# Patient Record
Sex: FEMALE | Race: BLACK OR AFRICAN AMERICAN | NOT HISPANIC OR LATINO | Employment: UNEMPLOYED | URBAN - METROPOLITAN AREA
[De-identification: names, ages, dates, MRNs, and addresses within clinical notes are randomized per-mention and may not be internally consistent; named-entity substitution may affect disease eponyms.]

---

## 2018-05-10 ENCOUNTER — TRANSFERRED RECORDS (OUTPATIENT)
Dept: HEALTH INFORMATION MANAGEMENT | Facility: CLINIC | Age: 33
End: 2018-05-10

## 2019-03-14 ENCOUNTER — OFFICE VISIT (OUTPATIENT)
Dept: FAMILY MEDICINE | Facility: CLINIC | Age: 34
End: 2019-03-14
Payer: COMMERCIAL

## 2019-03-14 VITALS
DIASTOLIC BLOOD PRESSURE: 72 MMHG | SYSTOLIC BLOOD PRESSURE: 110 MMHG | WEIGHT: 150.6 LBS | BODY MASS INDEX: 26.68 KG/M2 | HEART RATE: 100 BPM | RESPIRATION RATE: 25 BRPM | HEIGHT: 63 IN | OXYGEN SATURATION: 99 %

## 2019-03-14 DIAGNOSIS — Z83.3 FAMILY HISTORY OF DIABETES MELLITUS IN MOTHER: ICD-10-CM

## 2019-03-14 DIAGNOSIS — Z13.220 SCREENING FOR HYPERLIPIDEMIA: ICD-10-CM

## 2019-03-14 DIAGNOSIS — Z00.00 ROUTINE HISTORY AND PHYSICAL EXAMINATION OF ADULT: Primary | ICD-10-CM

## 2019-03-14 DIAGNOSIS — Z13.1 SCREENING FOR DIABETES MELLITUS: ICD-10-CM

## 2019-03-14 DIAGNOSIS — L81.1 MELASMA: ICD-10-CM

## 2019-03-14 PROCEDURE — 36415 COLL VENOUS BLD VENIPUNCTURE: CPT | Performed by: NURSE PRACTITIONER

## 2019-03-14 PROCEDURE — 82947 ASSAY GLUCOSE BLOOD QUANT: CPT | Performed by: NURSE PRACTITIONER

## 2019-03-14 PROCEDURE — 99385 PREV VISIT NEW AGE 18-39: CPT | Performed by: NURSE PRACTITIONER

## 2019-03-14 PROCEDURE — 80061 LIPID PANEL: CPT | Performed by: NURSE PRACTITIONER

## 2019-03-14 SDOH — HEALTH STABILITY: MENTAL HEALTH: HOW OFTEN DO YOU HAVE A DRINK CONTAINING ALCOHOL?: NEVER

## 2019-03-14 ASSESSMENT — PAIN SCALES - GENERAL: PAINLEVEL: NO PAIN (0)

## 2019-03-14 ASSESSMENT — MIFFLIN-ST. JEOR: SCORE: 1347.25

## 2019-03-14 NOTE — Clinical Note
Please abstract the following data from this visit with this patient into the appropriate field in Epic:Pap smear done on this date: 11/14/16 (approximately), by this group: [facility], results were normal with negative HPV (record in CareEverywhere).

## 2019-03-14 NOTE — LETTER
"March 15, 2019      Thea Rodriguez  [address] [place] [id number]        Dear Thea,     Your cholesterol levels are high.   Your \"good\" HDL cholesterol is low.  Increasing exercise can improve the \"good\" HDL cholesterol.   Your fasting blood sugar is 105, which is in the Prediabetes range of 100-125.   I recommend that you avoid added sugar, sugary beverages, sodas, juice, junk foods, and sweets.  Enclosed are the results.    Results for orders placed or performed in visit on 03/14/19   Lipid Profile (Chol, Trig, HDL, LDL calc)   Result Value Ref Range    Cholesterol 215 (H) <200 mg/dL    Triglycerides 102 <150 mg/dL    HDL Cholesterol 33 (L) >49 mg/dL    LDL Cholesterol Calculated 162 (H) <100 mg/dL    Non HDL Cholesterol 182 (H) <130 mg/dL   Glucose   Result Value Ref Range    Glucose 105 (H) 70 - 99 mg/dL     I recommend we recheck your fasting labs in 6-12 months.   Please consider the below nutrition recommendations as well.       Ways to improve your cholesterol       1- Eats less saturated fats (including avoiding \"trans\" fats).       2 - Eat more unsaturated fats  - found in vegetables, grains, and tree nuts.                        Also by replacing butter with canola oil or olive oil.       3 - Eat more nuts.                        1-2 ounces (a small handful) of almonds, walnuts, hazelnuts or pecans once a day in place of other less healthy snacks.       4 - Eat more high fiber foods - vegetables and whole grains including oat bran, oats, beans, peas, and flax seed.       5 - Eat more fish - such as salmon, tuna, mackerel, and sardines.  1 or 2 six ounce servings per week is a healthy replacement for other proteins.       6 - Exercise for at least 150 minutes per week - which is equal to 30 minutes 5 days per week.     Sincerely,      Vi Castaneda, DNP, APRN, CNP/kb                      "

## 2019-03-14 NOTE — PROGRESS NOTES
SUBJECTIVE:   CC: Thea Rodriguez is an 33 year old woman who presents for preventive health visit.     Healthy Habits:    Do you get at least three servings of calcium containing foods daily (dairy, green leafy vegetables, etc.)? yes    Amount of exercise or daily activities, outside of work: no    Problems taking medications regularly No    Medication side effects: No    Have you had an eye exam in the past two years? no    Do you see a dentist twice per year? no    Do you have sleep apnea, excessive snoring or daytime drowsiness?no    Pigmentation changes on face- nose, cheeks, forehead.  Started 2 years ago and worsened with recent pregnancy.  She has a 5 month old child.  Is not breastfeeding anymore.  Cleans her face with a yogurt.    Headaches that come and go.    Today's PHQ-2 Score:   PHQ-2 ( 1999 Pf[employer]) 3/14/2019   Q1: Little interest or pleasure in doing things 0   Q2: Feeling down, depressed or hopeless 0   PHQ-2 Score 0       Abuse: Current or Past(Physical, Sexual or Emotional)- NO  Do you feel safe in your environment? YES    Social History     Tobacco Use     Smoking status: Not on file     Smokeless tobacco: Never Used   Substance Use Topics     Alcohol use: No     Frequency: Never     If you drink alcohol do you typically have >3 drinks per day or >7 drinks per week? Yes - AUDIT SCORE:     No flowsheet data found.                     Reviewed orders with patient.  Reviewed health maintenance and updated orders accordingly - Yes  BP Readings from Last 3 Encounters:   03/14/19 110/72    Wt Readings from Last 3 Encounters:   03/14/19 68.3 kg (150 lb 9.6 oz)                  Patient Active Problem List   Diagnosis     Melasma     History reviewed. No pertinent surgical history.    Social History     Tobacco Use     Smoking status: Not on file     Smokeless tobacco: Never Used   Substance Use Topics     Alcohol use: No     Frequency: Never     Family History   Problem Relation Age of Onset      "Diabetes Mother 50     No Known Problems Father      No Known Problems Brother      No Known Problems Sister      No Known Problems Brother      No Known Problems Brother      No Known Problems Brother      No Known Problems Sister      Breast Cancer No family hx of      Heart Disease No family hx of          No current outpatient medications on file.     Not on File    Mammogram not appropriate for this patient based on age.    Pertinent mammograms are reviewed under the imaging tab.  History of abnormal Pap smear: unknown.  Last Pap within normal limits     Reviewed and updated as needed this visit by clinical staff  Allergies  Meds  Problems  Med Hx  Surg Hx  Fam Hx         Reviewed and updated as needed this visit by Provider  Allergies  Meds  Problems  Med Hx  Surg Hx  Fam Hx            ROS:  CONSTITUTIONAL: NEGATIVE for fever, chills, change in weight  INTEGUMENTARY/SKIN: POSITIVE for pigmentation changes  EYES: NEGATIVE for vision changes or irritation  ENT: NEGATIVE for ear, mouth and throat problems  RESP: NEGATIVE for significant cough or SOB  BREAST: NEGATIVE for masses, tenderness or discharge  CV: NEGATIVE for chest pain, palpitations or peripheral edema  GI: NEGATIVE for nausea, abdominal pain, heartburn, or change in bowel habits  : NEGATIVE for unusual urinary or vaginal symptoms. Periods are regular.  MUSCULOSKELETAL: NEGATIVE for significant arthralgias or myalgia  NEURO: NEGATIVE for weakness, dizziness or paresthesias  PSYCHIATRIC: NEGATIVE for changes in mood or affect    OBJECTIVE:   /72 (BP Location: Right arm, Patient Position: Chair, Cuff Size: Adult Regular)   Pulse 100   Resp 25   Ht 1.6 m (5' 3\")   Wt 68.3 kg (150 lb 9.6 oz)   SpO2 99%   BMI 26.68 kg/m    EXAM:  GENERAL: healthy, alert and no distress  EYES: Eyes grossly normal to inspection, PERRL and conjunctivae and sclerae normal  HENT: ear canals and TM's normal, nose and mouth without ulcers or " "lesions  NECK: no adenopathy, no asymmetry, masses, or scars and thyroid normal to palpation  RESP: lungs clear to auscultation - no rales, rhonchi or wheezes  BREAST: normal without masses, tenderness or nipple discharge and no palpable axillary masses or adenopathy  CV: regular rate and rhythm, normal S1 S2, no S3 or S4, no murmur, click or rub, no peripheral edema and peripheral pulses strong  ABDOMEN: soft, nontender, no hepatosplenomegaly, no masses and bowel sounds normal  MS: no gross musculoskeletal defects noted, no edema  SKIN: Confluent hyperpigmented macules on the cheeks, forehead, nose  NEURO: Normal strength and tone, mentation intact and speech normal  PSYCH: mentation appears normal, affect normal/bright      ASSESSMENT/PLAN:   1. Routine history and physical examination of adult  -Pap due 11/2021 as last Pap at [facility] normal negative HPV and will be abstracted.    2. Melasma    - DERMATOLOGY REFERRAL    3. Screening for diabetes mellitus    - Glucose    4. Screening for hyperlipidemia    - Lipid Profile (Chol, Trig, HDL, LDL calc)    COUNSELING:   Reviewed preventive health counseling, as reflected in patient instructions       Regular exercise       Healthy diet/nutrition    BP Readings from Last 1 Encounters:   03/14/19 110/72     Estimated body mass index is 26.68 kg/m  as calculated from the following:    Height as of this encounter: 1.6 m (5' 3\").    Weight as of this encounter: 68.3 kg (150 lb 9.6 oz).      Weight management plan: Discussed healthy diet and exercise guidelines     does not have a smoking history on file. she has never used smokeless tobacco.      Counseling Resources:  ATP IV Guidelines  Pooled Cohorts Equation Calculator  Breast Cancer Risk Calculator  FRAX Risk Assessment  ICSI Preventive Guidelines  Dietary Guidelines for Americans, 2010  USDA's MyPlate  ASA Prophylaxis  Lung CA Screening    Vi Castaneda, NP  [facility]  "

## 2019-03-15 PROBLEM — E78.5 HYPERLIPIDEMIA LDL GOAL <160: Status: ACTIVE | Noted: 2019-03-15

## 2019-03-15 PROBLEM — R73.01 IMPAIRED FASTING GLUCOSE: Status: ACTIVE | Noted: 2019-03-15

## 2019-03-15 LAB
CHOLEST SERPL-MCNC: 215 MG/DL
GLUCOSE SERPL-MCNC: 105 MG/DL (ref 70–99)
HDLC SERPL-MCNC: 33 MG/DL
LDLC SERPL CALC-MCNC: 162 MG/DL
NONHDLC SERPL-MCNC: 182 MG/DL
TRIGL SERPL-MCNC: 102 MG/DL

## 2019-03-25 ENCOUNTER — TELEPHONE (OUTPATIENT)
Dept: FAMILY MEDICINE | Facility: CLINIC | Age: 34
End: 2019-03-25

## 2019-03-25 NOTE — TELEPHONE ENCOUNTER
Called patient and spoke with  who states that after using prescribed cream his wife's face looked like it was burned. Advised that she be seen in clinic by a provider to determine next steps of treatment.  verbalized understanding and agreement of plan.     Opal Soto RN

## 2019-03-25 NOTE — TELEPHONE ENCOUNTER
Reason for Call:  Medication or medication refill:    Do you use a [facility]?  Name of the pharmacy and phone number for the current request:  Did not specify      Name of the medication requested: Lotion    Other request: Patient's  called in saying that when Thea saw Leonel on 3/14 she was prescribed some lotion and is having negative side effects from that lotion. Patient's  would like a call back to discuss other options.    Can we leave a detailed message on this number? YES    Phone number patient can be reached at: Other phone number:  [phone]    Best Time: anytime    Call taken on 3/25/2019 at 1:31 PM by Brandon De La Rosa

## 2019-04-26 ENCOUNTER — OFFICE VISIT (OUTPATIENT)
Dept: FAMILY MEDICINE | Facility: CLINIC | Age: 34
End: 2019-04-26
Payer: COMMERCIAL

## 2019-04-26 VITALS
DIASTOLIC BLOOD PRESSURE: 80 MMHG | OXYGEN SATURATION: 100 % | HEART RATE: 80 BPM | RESPIRATION RATE: 20 BRPM | WEIGHT: 145.6 LBS | BODY MASS INDEX: 25.8 KG/M2 | HEIGHT: 63 IN | TEMPERATURE: 97.7 F | SYSTOLIC BLOOD PRESSURE: 128 MMHG

## 2019-04-26 DIAGNOSIS — S46.811A STRAIN OF RIGHT TRAPEZIUS MUSCLE, INITIAL ENCOUNTER: Primary | ICD-10-CM

## 2019-04-26 PROCEDURE — 99213 OFFICE O/P EST LOW 20 MIN: CPT | Performed by: NURSE PRACTITIONER

## 2019-04-26 RX ORDER — CYCLOBENZAPRINE HCL 5 MG
5 TABLET ORAL
Qty: 15 TABLET | Refills: 0 | Status: SHIPPED | OUTPATIENT
Start: 2019-04-26 | End: 2019-09-17

## 2019-04-26 ASSESSMENT — PAIN SCALES - GENERAL: PAINLEVEL: SEVERE PAIN (6)

## 2019-04-26 ASSESSMENT — MIFFLIN-ST. JEOR: SCORE: 1319.57

## 2019-04-26 NOTE — PATIENT INSTRUCTIONS
Patient Education     Neck Exercises: Active Neck Rotation    To start, lie on your back, knees bent and feet flat on the floor. Keep your ears, shoulders, and hips aligned, but don t press your lower back to the floor. Rest your hands on your pelvis. Breathe deeply and relax.  Here are the steps for the active neck rotation:    Use your neck muscles to turn your head to one side until you feel a stretch in the muscles.    Hold for 5 seconds. Then turn to the other side.    Repeat 5 times on each side.  Note: Keep your shoulders on the floor. Don t lift or tuck your chin as you turn your head.  Date Last Reviewed: 11/1/2017 2000-2018 [employer]. [address] [place] [address]. All rights reserved. This information is not intended as a substitute for professional medical care. Always follow your healthcare professional's instructions.           Patient Education     Neck Exercises: Head Lifts    Do this exercise on your hands and knees. Keep your knees under your hips and your hands under your shoulders. Keep your spine in a neutral position (not arched or sagging). Keep your ears in line with your shoulders. Hold for a few seconds before starting the exercise:    Keeping your back straight, slowly drop your chin toward your chest. Tuck in your chin.    Hold for 5 seconds. Then lift your head until your neck is level with your back.    Hold for 5 seconds. Repeat 5 to10 times.  Date Last Reviewed: 3/1/2018    5468-7758 [employer]. [address] [place] [address]. All rights reserved. This information is not intended as a substitute for professional medical care. Always follow your healthcare professional's instructions.           Patient Education     Shoulder Squeeze Exercise    To start, sit in a chair with your feet flat on the floor. Shift your weight slightly forward to avoid rounding your back. Relax. Keep your ears, shoulders, and hips aligned:    Raise  your arms to shoulder height, elbows bent and palms forward.    Move your arms back, squeezing your shoulder blades together.    Hold for 10 seconds. Return to starting position.     Repeat 5 times.   For your safety, check with your healthcare provider before starting an exercise program.   Date Last Reviewed: 11/1/2017 2000-2018 [employer]. [address] [place] [address]. All rights reserved. This information is not intended as a substitute for professional medical care. Always follow your healthcare professional's instructions.           Patient Education     Shoulder Shrug Exercise    To start, sit in a chair with your feet flat on the floor. Shift your weight slightly forward to avoid rounding your back. Relax. Keep your ears, shoulders, and hips aligned:    Raise both of your shoulders as high as you can, as if you were trying to touch them to your ears. Keep your head and neck still and relaxed.    Hold for a count of 10. Release.    Repeat 5 times.  For your safety, check with your healthcare provider before starting an exercise program.   Date Last Reviewed: 11/1/2017 2000-2018 The [employer]. [address] [place] [address]. All rights reserved. This information is not intended as a substitute for professional medical care. Always follow your healthcare professional's instructions.           Patient Education     Shoulder Clock Exercise    To start, stand tall with your ears, shoulders, and hips in line. Your feet should be slightly apart, positioned just under your hips. Focus your eyes directly in front of you.  this position for a few seconds before starting your exercise. This helps increase your awareness of proper posture.    Imagine that your right shoulder is the center of a clock. With the outer point of your shoulder, roll it around to slowly trace the outer edge of the clock.    Move clockwise first, then  counterclockwise.    Repeat 3 to 5 times. Switch shoulders.  Date Last Reviewed: 3/1/2018    0275-1672 [employer]. [address] [place] [address]. All rights reserved. This information is not intended as a substitute for professional medical care. Always follow your healthcare professional's instructions.           Patient Education     Shoulder and Upper Back Stretch  To start, stand tall with your ears, shoulders, and hips in line. Your feet should be slightly apart, positioned just under your hips. Focus your eyes directly in front of you.  this position for a few seconds before starting your exercise. This helps increase your awareness of proper posture.          Reach overhead and slightly back with both arms. Keep your shoulders and neck aligned and your elbows behind your shoulders:    With your palms facing the ceiling, turn your fingers inward.    Take a deep breath. Breathe out, and lower your elbows toward your buttocks. Hold for 5 seconds, then return to starting position.    Repeat 3 times.  Date Last Reviewed: 11/1/2017 2000-2018 [employer]. [address] [place] [address]. All rights reserved. This information is not intended as a substitute for professional medical care. Always follow your healthcare professional's instructions.         [facility]     Discharged by : Vi Castaneda NP  Paper scripts provided to patient : none     If you have any questions regarding your visit please contact your care team:     Team Gold                Clinic Hours Telephone Number     Dr. Jimmy Castaneda, ISAUR[facility]   7am-7pm  Monday - Thursday   7am-5pm  Fridays  [phone]   (Appointment scheduling available 24/7)     RN Line  [phone] option 2     Urgent Care - [facility] - 11am-9pm Monday-Friday Saturday-Sunday- 9am-5pm     [facility] -   5pm-9pm Monday-Friday    Saturday-Sunday- 9am-5pm    [phone] - [facility]    [phone] - [facility]     For a Price Quote for your services, please call our Consumer Price Line at [phone].     What options do I have for visits at the clinic other than the traditional office visit?     To expand how we care for you, many of our providers are utilizing electronic visits (e-visits) and telephone visits, when medically appropriate, for interactions with their patients rather than a visit in the clinic. We also offer nurse visits for many medical concerns. Just like any other service, we will bill your insurance company for this type of visit based on time spent on the phone with your provider. Not all insurance companies cover these visits. Please check with your medical insurance if this type of visit is covered. You will be responsible for any charges that are not paid by your insurance.     E-visits via [employer]: generally incur a $45.00 fee.     Telephone visits:  Time spent on the phone: *charged based on time that is spent on the phone in increments of 10 minutes. Estimated cost:   5-10 mins $30.00   11-20 mins. $59.00   21-30 mins. $85.00       Use [employer] (secure email communication and access to your chart) to send your primary care provider a message or make an appointment. Ask someone on your Team how to sign up for [employer].     As always, Thank you for trusting us with your health care needs!      [facility]:    Scheduling Appointments  [facility]  Call: [phone]    [facility]  Call: [phone]    [facility]  Call: [phone]    For Gastroenterology referrals   [facility]   Clinics and Surgery Center, 4th Floor   [address] [place] [address]   Appointments: [phone]    WHERE TO GO FOR CARE?    Clinic    Make an appointment if you:       Are sick (cold, cough, flu, sore throat,  earache or in pain).       Have a small injury (sprain, small cut, burn or broken bone).       Need a physical exam, Pap smear, vaccine or prescription refill.       Have questions about your health or medicines.    To reach us:      Call [phone] ([phone]). Open 24 hours every day. (For counseling services, call [phone].)    Log into [employer]. (Visit [employer] to create an account.) Hospital emergency room    An emergency is a serious or life- threatening problem that must be treated right away.    Call 471 or get to the hospital if you have:      Very bad or sudden:            - Chest pain or pressure         - Bleeding         - Head or belly pain         - Dizziness or trouble seeing, walking or                          Speaking      Problems breathing      Blood in your vomit or you are coughing up blood      A major injury (knocked out, loss of a finger or limb, rape, broken bone protruding from skin)    A mental health crisis. (Or call the Mental Health Crisis line at 1-525.819.8554 or Suicide Prevention Hotline at 1-192.271.4504.)    Open 24 hours every day. You don't need an appointment.     Urgent care    Visit urgent care for sickness or small injuries when the clinic is closed. You don't need an appointment. To check hours or find an urgent care near you, visit [email or web address]. Online care    Get online care from [facility] for more than 70 common problems, like colds, allergies and infections. Open 24 hours every day at:   [email or web address]   Need help deciding?    For advice about where to be seen, you may call your clinic and ask to speak with a nurse. We're here for you 24 hours every day.         If you are deaf or hard of hearing, please let us know. We provide many free services including sign language interpreters, oral interpreters, TTYs, telephone amplifiers, note takers and written materials.

## 2019-04-26 NOTE — PROGRESS NOTES
SUBJECTIVE:   Thea Rodriguez is a 34 year old female who presents to clinic today for the following   health issues:      Right Shoulder and Neck Pain    Onset: 2 days     Description:   Location: right shoulder and neck   Character: Sharp    Intensity: severe    Progression of Symptoms: better    Accompanying Signs & Symptoms:  Other symptoms: none    History:   Previous similar pain: no       Precipitating factors:   Trauma or overuse: no.  However, she recently was bending forward for a prolonged period of time when doing her hair that might have triggered the neck pain    Alleviating factors:  Improved by: NSAID - advil  and warm water     Therapies Tried and outcome: not much relief  No fevers, chills, or sweats.  No recent cold like symptoms.    Here with intpreteter Roxanne, multi lingual.    Professional [employer]  is present and interpreted during this visit.     She states she did go to Dermatology and was started on a topical treatment for melasma.    Additional history: as documented    Reviewed  and updated as needed this visit by clinical staff  Allergies  Meds  Problems  Med Hx  Surg Hx  Fam Hx         Reviewed and updated as needed this visit by Provider  Allergies  Meds  Problems  Med Hx  Surg Hx  Fam Hx         Patient Active Problem List   Diagnosis     Melasma     Family history of diabetes mellitus in mother     Hyperlipidemia LDL goal <160     Impaired fasting glucose     History reviewed. No pertinent surgical history.    Social History     Tobacco Use     Smoking status: Not on file     Smokeless tobacco: Never Used   Substance Use Topics     Alcohol use: No     Frequency: Never     Family History   Problem Relation Age of Onset     Diabetes Mother 50     No Known Problems Father      No Known Problems Brother      No Known Problems Sister      No Known Problems Brother      No Known Problems Brother      No Known Problems Brother      No Known Problems Sister      Breast  "Cancer No family hx of      Heart Disease No family hx of          No Known Allergies  BP Readings from Last 3 Encounters:   04/26/19 128/80   03/14/19 110/72    Wt Readings from Last 3 Encounters:   04/26/19 66 kg (145 lb 9.6 oz)   03/14/19 68.3 kg (150 lb 9.6 oz)                    ROS:  Constitutional, HEENT, cardiovascular, pulmonary, gi and gu systems are negative, except as otherwise noted.    OBJECTIVE:     /80 (BP Location: Right arm, Patient Position: Chair, Cuff Size: Adult Regular)   Pulse 80   Temp 97.7  F (36.5  C) (Oral)   Resp 20   Ht 1.6 m (5' 3\")   Wt 66 kg (145 lb 9.6 oz)   SpO2 100%   BMI 25.79 kg/m    Body mass index is 25.79 kg/m .  GENERAL: healthy, alert and no distress  MS: neck exam shows tenderness of the the trapezius of right side.  No tenderness of the right shoulder.  Full ROM of bilateral shoulders, however patient does have pain with internal rotation of right shoulder.  Patient has decreased flexion and extension of neck due to pain.  PSYCH: mentation appears normal, affect normal/bright      ASSESSMENT/PLAN:     1. Strain of right trapezius muscle, initial encounter    - Recommend starting gentle stretches of the neck, trapezius, and shoulders.  Handout with pictures provided.  - Continue over the counter Advil as needed for pain.  - cyclobenzaprine (FLEXERIL) 5 MG tablet; Take 1 tablet (5 mg) by mouth nightly as needed for muscle spasms  Dispense: 15 tablet; Refill: 0  Discussed with patient the indication and use of medication(s), risks/benefits, and potential adverse side effects.  Patient/guardian verbalized understanding and agreement with the plan.  I cautioned patient with use of Flexeril, as it can cause drowsiness so use at bedtime as needed.  Cautioned to keep medication away from children.    Return in about 1 month (around 5/24/2019) for if symptoms worsen or fail to improve.    Vi Castaneda, NP  [facility]      "

## 2019-05-31 ENCOUNTER — TELEPHONE (OUTPATIENT)
Dept: FAMILY MEDICINE | Facility: CLINIC | Age: 34
End: 2019-05-31

## 2019-05-31 DIAGNOSIS — Z91.89 AT RISK FOR ALTERATION IN NUTRITION: Primary | ICD-10-CM

## 2019-05-31 RX ORDER — PNV NO.95/FERROUS FUM/FOLIC AC 28MG-0.8MG
1 TABLET ORAL DAILY
Qty: 90 TABLET | Refills: 3 | Status: SHIPPED | OUTPATIENT
Start: 2019-05-31 | End: 2020-07-14

## 2019-05-31 RX ORDER — PNV NO.95/FERROUS FUM/FOLIC AC 28MG-0.8MG
1 TABLET ORAL DAILY
Qty: 90 TABLET | Refills: 3 | Status: SHIPPED | OUTPATIENT
Start: 2019-05-31 | End: 2019-05-31

## 2019-05-31 NOTE — TELEPHONE ENCOUNTER
Reason for call:  Medication   If this is a refill request, has the caller requested the refill from the pharmacy already? No  Will the patient be using a [facility]? No  Name of the pharmacy and phone number for the current request: [facility] in [place]    Name of the medication requested: Prenatal vitamins    Other request: Patient is requesting a prescription for prenatal vitamins.    Phone number to reach patient:  Home number on file [phone] (home)    Best Time:  Asap    Can we leave a detailed message on this number?  NO

## 2019-09-03 ENCOUNTER — PRENATAL OFFICE VISIT (OUTPATIENT)
Dept: NURSING | Facility: CLINIC | Age: 34
End: 2019-09-03
Payer: COMMERCIAL

## 2019-09-03 VITALS
HEIGHT: 62 IN | HEART RATE: 90 BPM | BODY MASS INDEX: 27.46 KG/M2 | WEIGHT: 149.2 LBS | DIASTOLIC BLOOD PRESSURE: 70 MMHG | SYSTOLIC BLOOD PRESSURE: 112 MMHG

## 2019-09-03 DIAGNOSIS — O09.529 SUPERVISION OF HIGH-RISK PREGNANCY OF ELDERLY MULTIGRAVIDA: Primary | ICD-10-CM

## 2019-09-03 LAB
ABO + RH BLD: NORMAL
ABO + RH BLD: NORMAL
ALBUMIN UR-MCNC: NEGATIVE MG/DL
APPEARANCE UR: CLEAR
BILIRUB UR QL STRIP: NEGATIVE
BLD GP AB SCN SERPL QL: NORMAL
BLOOD BANK CMNT PATIENT-IMP: NORMAL
COLOR UR AUTO: YELLOW
ERYTHROCYTE [DISTWIDTH] IN BLOOD BY AUTOMATED COUNT: 14.7 % (ref 10–15)
GLUCOSE UR STRIP-MCNC: NEGATIVE MG/DL
HCG UR QL: POSITIVE
HCT VFR BLD AUTO: 40.5 % (ref 35–47)
HGB BLD-MCNC: 13.2 G/DL (ref 11.7–15.7)
HGB UR QL STRIP: NEGATIVE
KETONES UR STRIP-MCNC: NEGATIVE MG/DL
LEUKOCYTE ESTERASE UR QL STRIP: NEGATIVE
MCH RBC QN AUTO: 28.6 PG (ref 26.5–33)
MCHC RBC AUTO-ENTMCNC: 32.6 G/DL (ref 31.5–36.5)
MCV RBC AUTO: 88 FL (ref 78–100)
NITRATE UR QL: NEGATIVE
PH UR STRIP: 7 PH (ref 5–7)
PLATELET # BLD AUTO: 248 10E9/L (ref 150–450)
RBC # BLD AUTO: 4.62 10E12/L (ref 3.8–5.2)
SOURCE: NORMAL
SP GR UR STRIP: 1.01 (ref 1–1.03)
SPECIMEN EXP DATE BLD: NORMAL
UROBILINOGEN UR STRIP-ACNC: 0.2 EU/DL (ref 0.2–1)
WBC # BLD AUTO: 8.8 10E9/L (ref 4–11)

## 2019-09-03 PROCEDURE — 86901 BLOOD TYPING SEROLOGIC RH(D): CPT | Performed by: OBSTETRICS & GYNECOLOGY

## 2019-09-03 PROCEDURE — 87086 URINE CULTURE/COLONY COUNT: CPT | Performed by: OBSTETRICS & GYNECOLOGY

## 2019-09-03 PROCEDURE — 81003 URINALYSIS AUTO W/O SCOPE: CPT | Performed by: OBSTETRICS & GYNECOLOGY

## 2019-09-03 PROCEDURE — 36415 COLL VENOUS BLD VENIPUNCTURE: CPT | Performed by: OBSTETRICS & GYNECOLOGY

## 2019-09-03 PROCEDURE — 86850 RBC ANTIBODY SCREEN: CPT | Performed by: OBSTETRICS & GYNECOLOGY

## 2019-09-03 PROCEDURE — 86787 VARICELLA-ZOSTER ANTIBODY: CPT | Performed by: OBSTETRICS & GYNECOLOGY

## 2019-09-03 PROCEDURE — 87340 HEPATITIS B SURFACE AG IA: CPT | Performed by: OBSTETRICS & GYNECOLOGY

## 2019-09-03 PROCEDURE — 85027 COMPLETE CBC AUTOMATED: CPT | Performed by: OBSTETRICS & GYNECOLOGY

## 2019-09-03 PROCEDURE — 81025 URINE PREGNANCY TEST: CPT | Performed by: OBSTETRICS & GYNECOLOGY

## 2019-09-03 PROCEDURE — 86762 RUBELLA ANTIBODY: CPT | Performed by: OBSTETRICS & GYNECOLOGY

## 2019-09-03 PROCEDURE — 86780 TREPONEMA PALLIDUM: CPT | Performed by: OBSTETRICS & GYNECOLOGY

## 2019-09-03 PROCEDURE — 86900 BLOOD TYPING SEROLOGIC ABO: CPT | Performed by: OBSTETRICS & GYNECOLOGY

## 2019-09-03 PROCEDURE — 99207 ZZC NO CHARGE NURSE ONLY: CPT

## 2019-09-03 PROCEDURE — 87389 HIV-1 AG W/HIV-1&-2 AB AG IA: CPT | Performed by: OBSTETRICS & GYNECOLOGY

## 2019-09-03 ASSESSMENT — MIFFLIN-ST. JEOR: SCORE: 1320.02

## 2019-09-03 NOTE — PROGRESS NOTES
Prenatal OB Questionnaire  Past Medical History  Diabetes   No  Hypertension   No  Heart Disease, mitral valve prolapse, or rheumatic fever?   No  An autoimmune disorder such as Lupus or Rheumatoid Arthritis?   No  Kidney Disease or Urinary Tract Infection?   No  Epilepsy, seizures or spells?   No  Migraine headaches?   No  A stroke or loss of function or sensation?   No  Any other neurological problems?   No  Have you ever been treated for depression?  No  Are you having problems with crying spells or loss of self-esteem?   No  Have you ever required psychiatric care?   No  Have you ever hepatitis, liver disease or jaundice?   No  Have you ever been treated for blood clots in your veins, deep venous thrombosis, inflammation in the veins, thrombosis, phlebitis, pulmonary embolism or varicosities?   No  Have you had excessive bleeding after surgery or dental work?   No  Do you bleed more than other women after a cut or scratch?   No  Do you have a history of anemia?   No  Have you ever been treated for thyroid problems or taken thyroid medication?  No  Do you have any other endocrine problems?  No  Have you ever been in a major accident or suffered serious trauma?   No  Within the last year, has anyone hit slapped, kicked or otherwise hurt you?  No  In the last year, has anyone forced you to have sex when you didn't want to?  No  Have you ever had a blood transfusion?   Yes Patient reports she had one after most recent pregnancy, but is unsure, states she had something done, thinks was blood transfusion.   Would you refuse a blood transfusion if a doctor judged it to be medically necessary?   No  If you answered yes, would you rather die than have a blood transfusion?   No  If you answered yes, is this for Evangelical reasons?   No  Does anyone in your home smoke?   No  Do you use tobacco products?  No  Do you drink beer, wine, hard liquor?  No  Do you use any of the following: marijuana, speed, cocaine, heroine,  hallucinogens, or other drugs?  No  Is your blood type Rh negative?   No  Have you ever had abnormal antibodies in your blood?   No  Have you ever had asthma?   No  Have you ever had tuberculosis?   No  Do you have any allergies to drugs or over-the-counter medications?   No    Allergies as of 9/3/2019:    Allergies as of 2019     (No Known Allergies)       Do you have any breast problems?   No  Have you ever ?   Yes   Have you had any gynecological surgical procedures such as cervical conization, a LEEP procedure, laser treatment, cryosurgery of the cervix, or a dilation and curettage, etc?  No  Have you had any other surgical procedures?  Yes   Have you been hospitalized for a nonsurgical reason excluding normal delivery?   No  Have you ever had any anesthetic complications?   No  Have you ever had an abnormal pap smear?   No  Do you have a history of abnormalities of the uterus?   No  Did it take you more than one year to become pregnant?   No  Have you ever been evaluated or treated for infertility?   No  Is there a history of medical problems in your family, which you feel might adversely affect your health or pregnancy?   No  Do you have any other problems we have not asked you about which you feel may be important to this pregnancy?  No    Symptoms since Last Menstrual Period  Do you have any of the following:    *abdominal pain  No  *blood in stool or urine  No  *chest pain  No  *shortness of breath  No  *coughing or vomiting up blood No  *heart racing or skipping beats  No  *nausea and vomiting  No  *pain with urination  No  *vaginal discharge or bleeding  No  Current medications are:  Current Outpatient Medications   Medication Sig Dispense Refill     cyclobenzaprine (FLEXERIL) 5 MG tablet Take 1 tablet (5 mg) by mouth nightly as needed for muscle spasms 15 tablet 0     Hydroquinone 1.5 % CREA        Prenatal Vit-Fe Fumarate-FA (PRENATAL VITAMIN) 27-0.8 MG TABS Take 1 tablet by  mouth daily 90 tablet 3     Advised patient to stop hydroquinone cream at this time until discusses with provider.   Genetic Screening  At the time of birth, will you be 35 years old or older?  Yes   Has the patient, baby s father, or anyone in either family had:  Thalassemia (Italian, Greek, Mediterranean, or  background only) and an MCV result less than 80?  No  Neural tube defect such as meningomyelocele, spina bifida or anencephaly?  No  Congenital heart defect?  No  Down s syndrome?  No  Kamlesh-Sach s disease (Baptism, Cajun, Malay-Braxton)?  No  Sickle cell disease or trait (Soledad)?  No  Hemophilia or other inherited problems of blood coagulation? No  Muscular dystrophy?  No  Cystic Fibrosis?  No  Randall s chorea?  No  Mental retardation/autism? No   If yes, was the person tested for fragile X?  No  Any other inherited genetic or chromosomal disorder?  No  Maternal metabolic disorder (e.g. insulin-dependent diabetes, PKU)? No  A child with birth defects not listed above?  No  Recurrent pregnancy loss or a stillbirth?  No  Has the patient had any medications/street drugs/alcohol since her last menstrual period? No  Does the patient or baby s father have any other genetic risks?  No  Infection History  Do you object to being tested for Hepatitis B? No  Do you object to being tested for HIV? No  Do you feel that you are at high risk for coming in contact with the AIDS virus?  No  Have you ever been treated for tuberculosis?  No  Have you ever received the BCG vaccine for tuberculosis?  No  Have you ever had a positive skin test for tuberculosis? No  Do you live with someone who has tuberculosis?  No  Have you ever been exposed to tuberculosis?  No  Do you have genital herpes?  No  Does your partner have genital herpes?  No  Have you had a rash or viral illness since your last period?  No  Have you ever had Gonorrhea, Chlamydia, Syphilis, venereal warts, trichomoniasis, pelvic inflammatory disease or any  other sexually transmitted disease?  No  Do you know if you are a genital group B streptococcus carrier? No  You have not had chicken pox/varicella  Yes  Have you been vaccinated against chicken pox?  No Titer ordered today.   Have you had any other infectious disease? No        Early ultrasound screening tool:    Does patient have irregular periods?  No  Did patient use hormonal birth control in the three months prior to positive urine pregnancy test? No  Is the patient breastfeeding?  No  Is the patient 10 weeks or greater at time of education visit?  Yes US ordered.       Patient presents to clinic today prenatal education. This is the patient's 3rd pregnancy. She has had 1 miscarriage(s), 0 (s), 1 term birth(s) and 0  birth(s). She has 1 living child(kira).   This was a planned pregnancy.  Reviewed answers to patient's Prenatal OB Questionnaire. Screening is positive for See above.     Medical, surgical, family and ObGyn history updated as appropriate. Reviewed current medications and allergies. Patient istaking prenatal vitamins.     Discussed all of the options within [place] for her prenatal care including Dr. Cooper and Dr. De La Paz at [facility], midwives at [facility] and OB/GYN-Dr. Meraz and Dr. Kc. Next visit scheduled with Dr. Meraz for 9/10/19. Soonest opening given patient already over 16 weeks gestation and advanced maternal age.     Reviewed and discussed OB 1st Trimester Plans/Education  and also summarized in detail handouts and brochures included in OB Prenatal Folder that patient is able to keep and bring home with her.    Discussed all of the blood tests with pt who agrees to have all including HIV. Pt aware that results will be discussed at next office visit with MD unless abnl results are indicated and phone call will be made.    Will forward chart on to Prenatal Care Provider to review.    Marta Peña RN

## 2019-09-04 LAB
BACTERIA SPEC CULT: NORMAL
HBV SURFACE AG SERPL QL IA: NONREACTIVE
HIV 1+2 AB+HIV1 P24 AG SERPL QL IA: NONREACTIVE
RUBV IGG SERPL IA-ACNC: 26 IU/ML
SPECIMEN SOURCE: NORMAL
T PALLIDUM AB SER QL: NONREACTIVE
VZV IGG SER QL IA: 5.3 AI (ref 0–0.8)

## 2019-09-10 ENCOUNTER — PRENATAL OFFICE VISIT (OUTPATIENT)
Dept: OBGYN | Facility: CLINIC | Age: 34
End: 2019-09-10
Payer: COMMERCIAL

## 2019-09-10 VITALS
WEIGHT: 150.8 LBS | OXYGEN SATURATION: 98 % | SYSTOLIC BLOOD PRESSURE: 119 MMHG | BODY MASS INDEX: 27.75 KG/M2 | DIASTOLIC BLOOD PRESSURE: 78 MMHG | HEIGHT: 62 IN | HEART RATE: 102 BPM

## 2019-09-10 DIAGNOSIS — O34.219 PREVIOUS CESAREAN DELIVERY, ANTEPARTUM CONDITION OR COMPLICATION: ICD-10-CM

## 2019-09-10 DIAGNOSIS — O09.299 DOWN SYNDROME IN CHILD OF PRIOR PREGNANCY, CURRENTLY PREGNANT: ICD-10-CM

## 2019-09-10 DIAGNOSIS — O09.529 HIGH-RISK PREGNANCY, ELDERLY MULTIGRAVIDA, UNSPECIFIED TRIMESTER: Primary | ICD-10-CM

## 2019-09-10 PROCEDURE — 99207 ZZC FIRST OB VISIT: CPT | Performed by: OBSTETRICS & GYNECOLOGY

## 2019-09-10 ASSESSMENT — MIFFLIN-ST. JEOR: SCORE: 1327.27

## 2019-09-10 NOTE — PROGRESS NOTES
"Chief Complaint   Patient presents with     Prenatal Care     17+3.       Initial /78 (BP Location: Left arm, Patient Position: Sitting, Cuff Size: Adult Regular)   Pulse 102   Ht 1.575 m (5' 2\")   Wt 68.4 kg (150 lb 12.8 oz)   LMP 2019 (Exact Date)   SpO2 98%   BMI 27.58 kg/m   Estimated body mass index is 27.58 kg/m  as calculated from the following:    Height as of this encounter: 1.575 m (5' 2\").    Weight as of this encounter: 68.4 kg (150 lb 12.8 oz).  BP completed using cuff size: regular    Questioned patient about current smoking habits.  Pt. has never smoked.          The following HM Due: pap smear, soon      The following patient reported/Care Every where data was sent to:  P ABSTRACT QUALITY INITIATIVES [[id number]]  n/a      n/a and patient has appointment for today          SUBJECTIVE:   Thea Rodriguez is a  34 year old female  at 17w3d  by LMP  who presents for a new Ob visit.  She is here today without an interpretor.  She understands limited English and does not speak it.   Patient without complaint today.   Chart reviewed today.  First baby with down's syndrome.   She delivered on 18 at 41 wks by primary low transverse  section with a double layer uterine closure at [facility].  This was due to category 2 fetal heart rate tracing, arrest of dilatation.  Baby weighed 3349 g.    Op note was reviewed today.          POBHx:  OB History    Para Term  AB Living   3 1 1 0 1 1   SAB TAB Ectopic Multiple Live Births   1 0 0 0 1      # Outcome Date GA Lbr Kranthi/2nd Weight Sex Delivery Anes PTL Lv   3 Current            2 Term 2017   3.402 kg (7 lb 8 oz)  CS-Unspec   PACO      Name: Kidus   1 SAB                   Patient Active Problem List   Diagnosis     Melasma     Family history of diabetes mellitus in mother     Hyperlipidemia LDL goal <160     Impaired fasting glucose     High-risk pregnancy, elderly multigravida, unspecified trimester     " "Previous  delivery, antepartum condition or complication       Past Medical History:   Diagnosis Date     NO ACTIVE PROBLEMS        History reviewed. No pertinent surgical history.     Current Outpatient Medications   Medication     Prenatal Vit-Fe Fumarate-FA (PRENATAL VITAMIN) 27-0.8 MG TABS     cyclobenzaprine (FLEXERIL) 5 MG tablet     Hydroquinone 1.5 % CREA     No current facility-administered medications for this visit.        No Known Allergies      EXAM:  /78 (BP Location: Left arm, Patient Position: Sitting, Cuff Size: Adult Regular)   Pulse 102   Ht 1.575 m (5' 2\")   Wt 68.4 kg (150 lb 12.8 oz)   LMP 2019 (Exact Date)   SpO2 98%   BMI 27.58 kg/m      GENERAL: WDWN F in NAD  HEENT: no abnormalities  NECK: without thyromegaly or adenopathy  CHEST: clear to auscultation  CV: RRR without murmur  BREASTS: no palpable masses or adenopathy, no asymmetry or skin dimpling  ABDOMEN: S, NT, no palpable masses or hepatosplenomegaly  MUSCULOSKELETAL: no obvious abnormalities.  NEUROLOGICAL: normal strength, sensation, mental status  PSYCH: normal affect, appropriate  PELVIC: EG - normal adult female.  BUS - within normal limits.  Vagina - well rugated, no discharge.  Cervix - no lesions, no CMT.  Uterus - U-2   size and nontender.  Adnexae - no masses or tenderness.  RV - deferred.    FHT's present with doptone      ASSESSMENT/ PLAN:  IUP @ 17w3d by LMP  With Estimated Date of Delivery: Feb 15, 2020 based on LMP.   She has an US scheduled for tomorrow.     Encounter Diagnoses   Name Primary?     High-risk pregnancy, elderly multigravida, unspecified trimester Yes     Previous  delivery, antepartum condition or complication      Down syndrome in child of prior pregnancy, currently pregnant         Patient has US for date confirmation tomorrow. She will need to see MFM with this pregnancy for a level II Survey.   Could not discuss quad screen with patient today as there is no " interpretor available today.   Will have patient return to clinic next week for discussion and follow up of US.     New Ob labs reviewed today.     Rajni Meraz MD

## 2019-09-11 ENCOUNTER — HOSPITAL ENCOUNTER (OUTPATIENT)
Dept: ULTRASOUND IMAGING | Facility: CLINIC | Age: 34
Discharge: HOME | End: 2019-09-11
Attending: OBSTETRICS & GYNECOLOGY | Admitting: OBSTETRICS & GYNECOLOGY
Payer: COMMERCIAL

## 2019-09-11 DIAGNOSIS — O09.529 SUPERVISION OF HIGH-RISK PREGNANCY OF ELDERLY MULTIGRAVIDA: ICD-10-CM

## 2019-09-11 PROCEDURE — 76805 OB US >/= 14 WKS SNGL FETUS: CPT

## 2019-09-12 PROBLEM — O09.299 DOWN SYNDROME IN CHILD OF PRIOR PREGNANCY, CURRENTLY PREGNANT: Status: ACTIVE | Noted: 2019-09-10

## 2019-09-17 ENCOUNTER — PRENATAL OFFICE VISIT (OUTPATIENT)
Dept: OBGYN | Facility: CLINIC | Age: 34
End: 2019-09-17
Payer: COMMERCIAL

## 2019-09-17 VITALS
DIASTOLIC BLOOD PRESSURE: 75 MMHG | WEIGHT: 151.8 LBS | SYSTOLIC BLOOD PRESSURE: 114 MMHG | HEIGHT: 62 IN | HEART RATE: 100 BPM | BODY MASS INDEX: 27.94 KG/M2 | OXYGEN SATURATION: 100 %

## 2019-09-17 DIAGNOSIS — O09.529 HIGH-RISK PREGNANCY, ELDERLY MULTIGRAVIDA, UNSPECIFIED TRIMESTER: Primary | ICD-10-CM

## 2019-09-17 DIAGNOSIS — O09.299 DOWN SYNDROME IN CHILD OF PRIOR PREGNANCY, CURRENTLY PREGNANT: ICD-10-CM

## 2019-09-17 DIAGNOSIS — O34.219 PREVIOUS CESAREAN DELIVERY, ANTEPARTUM CONDITION OR COMPLICATION: ICD-10-CM

## 2019-09-17 PROCEDURE — 99207 ZZC PRENATAL VISIT: CPT | Performed by: OBSTETRICS & GYNECOLOGY

## 2019-09-17 ASSESSMENT — PATIENT HEALTH QUESTIONNAIRE - PHQ9
5. POOR APPETITE OR OVEREATING: NOT AT ALL
SUM OF ALL RESPONSES TO PHQ QUESTIONS 1-9: 0

## 2019-09-17 ASSESSMENT — ANXIETY QUESTIONNAIRES
3. WORRYING TOO MUCH ABOUT DIFFERENT THINGS: NOT AT ALL
1. FEELING NERVOUS, ANXIOUS, OR ON EDGE: NOT AT ALL
2. NOT BEING ABLE TO STOP OR CONTROL WORRYING: NOT AT ALL
6. BECOMING EASILY ANNOYED OR IRRITABLE: NOT AT ALL
7. FEELING AFRAID AS IF SOMETHING AWFUL MIGHT HAPPEN: NOT AT ALL
GAD7 TOTAL SCORE: 0
5. BEING SO RESTLESS THAT IT IS HARD TO SIT STILL: NOT AT ALL
IF YOU CHECKED OFF ANY PROBLEMS ON THIS QUESTIONNAIRE, HOW DIFFICULT HAVE THESE PROBLEMS MADE IT FOR YOU TO DO YOUR WORK, TAKE CARE OF THINGS AT HOME, OR GET ALONG WITH OTHER PEOPLE: NOT DIFFICULT AT ALL

## 2019-09-17 ASSESSMENT — MIFFLIN-ST. JEOR: SCORE: 1331.81

## 2019-09-17 NOTE — PROGRESS NOTES
18w3d  Here with interpretor today.   no complaints.  Feeling movement regularly now.   Had an US in radiology which confirmed THEODORE: Feb 15, 2020   Baby had bilateral CPC's so referral to [facility] for level II US was done.   Interpretor will help her make that appt.   discussed quad screen today and patient declined.   Patient's reports her first son was diagnosed with Down's syndrome.  He is 1 yr old and functions like a normal child so she does not think he has Down's.  RR

## 2019-09-18 ASSESSMENT — ANXIETY QUESTIONNAIRES: GAD7 TOTAL SCORE: 0

## 2019-10-09 ENCOUNTER — PRE VISIT (OUTPATIENT)
Dept: MATERNAL FETAL MEDICINE | Facility: CLINIC | Age: 34
End: 2019-10-09

## 2019-10-10 ENCOUNTER — OFFICE VISIT (OUTPATIENT)
Dept: MATERNAL FETAL MEDICINE | Facility: CLINIC | Age: 34
End: 2019-10-10
Attending: OBSTETRICS & GYNECOLOGY
Payer: COMMERCIAL

## 2019-10-10 ENCOUNTER — HOSPITAL ENCOUNTER (OUTPATIENT)
Dept: ULTRASOUND IMAGING | Facility: CLINIC | Age: 34
Discharge: HOME | End: 2019-10-10
Attending: OBSTETRICS & GYNECOLOGY | Admitting: OBSTETRICS & GYNECOLOGY
Payer: COMMERCIAL

## 2019-10-10 DIAGNOSIS — O26.90 PREGNANCY RELATED CONDITION, ANTEPARTUM: ICD-10-CM

## 2019-10-10 DIAGNOSIS — O09.522 MULTIGRAVIDA OF ADVANCED MATERNAL AGE IN SECOND TRIMESTER: Primary | ICD-10-CM

## 2019-10-10 PROCEDURE — 76811 OB US DETAILED SNGL FETUS: CPT

## 2019-10-10 NOTE — PROGRESS NOTES
"Please see \"Imaging\" tab under \"Chart Review\" for details of today's US.    Estela Abrams, DO    "

## 2019-10-10 NOTE — NURSING NOTE
present for [facility] appt- DEIRDRE Chou [facility].  ID [id number].

## 2019-10-17 ENCOUNTER — PRENATAL OFFICE VISIT (OUTPATIENT)
Dept: OBGYN | Facility: CLINIC | Age: 34
End: 2019-10-17
Payer: COMMERCIAL

## 2019-10-17 VITALS
DIASTOLIC BLOOD PRESSURE: 78 MMHG | OXYGEN SATURATION: 100 % | BODY MASS INDEX: 29.33 KG/M2 | SYSTOLIC BLOOD PRESSURE: 116 MMHG | WEIGHT: 159.4 LBS | TEMPERATURE: 97.5 F | HEIGHT: 62 IN | HEART RATE: 101 BPM

## 2019-10-17 DIAGNOSIS — O09.529 HIGH-RISK PREGNANCY, ELDERLY MULTIGRAVIDA, UNSPECIFIED TRIMESTER: Primary | ICD-10-CM

## 2019-10-17 PROCEDURE — 99207 ZZC PRENATAL VISIT: CPT | Performed by: OBSTETRICS & GYNECOLOGY

## 2019-10-17 RX ORDER — CHOLECALCIFEROL (VITAMIN D3) 50 MCG
1 TABLET ORAL DAILY
Qty: 100 TABLET | Refills: 3 | Status: SHIPPED | OUTPATIENT
Start: 2019-10-17 | End: 2021-03-03

## 2019-10-17 ASSESSMENT — MIFFLIN-ST. JEOR: SCORE: 1366.28

## 2019-10-17 NOTE — PROGRESS NOTES
22w5d  No complaints.  Baby is moving well.  Had a normal MFM US,  Baby girl.  discussed GCT at next visit. Reviewed diet and weight gain.  RR

## 2019-11-05 ENCOUNTER — PRENATAL OFFICE VISIT (OUTPATIENT)
Dept: OBGYN | Facility: CLINIC | Age: 34
End: 2019-11-05
Payer: COMMERCIAL

## 2019-11-05 VITALS
DIASTOLIC BLOOD PRESSURE: 78 MMHG | SYSTOLIC BLOOD PRESSURE: 116 MMHG | BODY MASS INDEX: 29.81 KG/M2 | TEMPERATURE: 97.8 F | WEIGHT: 162 LBS | HEART RATE: 82 BPM | OXYGEN SATURATION: 99 % | HEIGHT: 62 IN

## 2019-11-05 DIAGNOSIS — O09.529 HIGH-RISK PREGNANCY, ELDERLY MULTIGRAVIDA, UNSPECIFIED TRIMESTER: ICD-10-CM

## 2019-11-05 DIAGNOSIS — O34.219 PREVIOUS CESAREAN DELIVERY, ANTEPARTUM CONDITION OR COMPLICATION: Primary | ICD-10-CM

## 2019-11-05 LAB — HGB BLD-MCNC: 13.4 G/DL (ref 11.7–15.7)

## 2019-11-05 PROCEDURE — 86780 TREPONEMA PALLIDUM: CPT | Performed by: OBSTETRICS & GYNECOLOGY

## 2019-11-05 PROCEDURE — 84443 ASSAY THYROID STIM HORMONE: CPT | Performed by: OBSTETRICS & GYNECOLOGY

## 2019-11-05 PROCEDURE — 00000218 ZZHCL STATISTIC OBHBG - HEMOGLOBIN: Performed by: OBSTETRICS & GYNECOLOGY

## 2019-11-05 PROCEDURE — 36415 COLL VENOUS BLD VENIPUNCTURE: CPT | Performed by: OBSTETRICS & GYNECOLOGY

## 2019-11-05 PROCEDURE — 99207 ZZC PRENATAL VISIT: CPT | Performed by: OBSTETRICS & GYNECOLOGY

## 2019-11-05 PROCEDURE — 82306 VITAMIN D 25 HYDROXY: CPT | Performed by: OBSTETRICS & GYNECOLOGY

## 2019-11-05 PROCEDURE — 82950 GLUCOSE TEST: CPT | Performed by: OBSTETRICS & GYNECOLOGY

## 2019-11-05 ASSESSMENT — MIFFLIN-ST. JEOR: SCORE: 1378.08

## 2019-11-05 NOTE — PROGRESS NOTES
25w3d  Patient seen without an interpretor.  Was told none available.  Patient has no complaints.    Baby is moving well.  Patient doing GCT today. I requested clinic supervisor today to help facilitate having an interpretor for this patient with all future visits.  RR

## 2019-11-06 LAB
DEPRECATED CALCIDIOL+CALCIFEROL SERPL-MC: 52 UG/L (ref 20–75)
GLUCOSE 1H P 50 G GLC PO SERPL-MCNC: 144 MG/DL (ref 60–129)
T PALLIDUM AB SER QL: NONREACTIVE
TSH SERPL DL<=0.005 MIU/L-ACNC: 2.56 MU/L (ref 0.4–4)

## 2019-11-15 DIAGNOSIS — O09.529 HIGH-RISK PREGNANCY, ELDERLY MULTIGRAVIDA, UNSPECIFIED TRIMESTER: ICD-10-CM

## 2019-11-15 PROCEDURE — 82951 GLUCOSE TOLERANCE TEST (GTT): CPT | Performed by: OBSTETRICS & GYNECOLOGY

## 2019-11-15 PROCEDURE — 82952 GTT-ADDED SAMPLES: CPT | Performed by: OBSTETRICS & GYNECOLOGY

## 2019-11-15 PROCEDURE — 36415 COLL VENOUS BLD VENIPUNCTURE: CPT | Performed by: OBSTETRICS & GYNECOLOGY

## 2019-11-16 LAB
GLUCOSE 1H P 100 G GLC PO SERPL-MCNC: 177 MG/DL (ref 60–179)
GLUCOSE 2H P 100 G GLC PO SERPL-MCNC: 144 MG/DL (ref 60–154)
GLUCOSE 3H P 100 G GLC PO SERPL-MCNC: 129 MG/DL (ref 60–139)
GLUCOSE P FAST SERPL-MCNC: 90 MG/DL (ref 60–94)

## 2019-12-03 ENCOUNTER — PRENATAL OFFICE VISIT (OUTPATIENT)
Dept: OBGYN | Facility: CLINIC | Age: 34
End: 2019-12-03
Payer: COMMERCIAL

## 2019-12-03 VITALS
HEIGHT: 62 IN | HEART RATE: 89 BPM | BODY MASS INDEX: 30.25 KG/M2 | WEIGHT: 164.4 LBS | TEMPERATURE: 98.2 F | DIASTOLIC BLOOD PRESSURE: 74 MMHG | SYSTOLIC BLOOD PRESSURE: 111 MMHG | OXYGEN SATURATION: 99 %

## 2019-12-03 DIAGNOSIS — O34.219 PREVIOUS CESAREAN DELIVERY, ANTEPARTUM CONDITION OR COMPLICATION: ICD-10-CM

## 2019-12-03 DIAGNOSIS — O09.529 HIGH-RISK PREGNANCY, ELDERLY MULTIGRAVIDA, UNSPECIFIED TRIMESTER: Primary | ICD-10-CM

## 2019-12-03 PROCEDURE — 99207 ZZC PRENATAL VISIT: CPT | Performed by: OBSTETRICS & GYNECOLOGY

## 2019-12-03 ASSESSMENT — MIFFLIN-ST. JEOR: SCORE: 1388.96

## 2019-12-03 NOTE — PROGRESS NOTES
29w3d  No complaints.  Baby is moving well. Passed 3 hr GTT.  Normal weight gain.    discussed repeat  section for delivery given < 12 months from  section to conception.   Patient is fine with that plan.  Will schedule for 39 wks. Patient declines PPTL.   RR

## 2019-12-04 ENCOUNTER — TELEPHONE (OUTPATIENT)
Dept: OBGYN | Facility: CLINIC | Age: 34
End: 2019-12-04

## 2019-12-04 NOTE — TELEPHONE ENCOUNTER
Called pt to go over  with , lvm to call back    Type of surgery: ob  [place] [facility] [place]  Date and time of surgery: 20 1030a  Surgeon: Kt  Pre-Op Appt Date: tbd, surgeon to do  Post-Op Appt Date: 6 weeks   Packet sent out: Yes  Pre-cert/Authorization completed:  No  Date: 2019    Saida CLIFFORD, Surgery Coordinator

## 2019-12-17 ENCOUNTER — PRENATAL OFFICE VISIT (OUTPATIENT)
Dept: OBGYN | Facility: CLINIC | Age: 34
End: 2019-12-17
Payer: COMMERCIAL

## 2019-12-17 VITALS
TEMPERATURE: 98.2 F | BODY MASS INDEX: 30.88 KG/M2 | WEIGHT: 167.8 LBS | OXYGEN SATURATION: 100 % | HEIGHT: 62 IN | SYSTOLIC BLOOD PRESSURE: 112 MMHG | DIASTOLIC BLOOD PRESSURE: 80 MMHG | HEART RATE: 96 BPM

## 2019-12-17 DIAGNOSIS — Z23 NEED FOR TDAP VACCINATION: ICD-10-CM

## 2019-12-17 DIAGNOSIS — O09.529 HIGH-RISK PREGNANCY, ELDERLY MULTIGRAVIDA, UNSPECIFIED TRIMESTER: Primary | ICD-10-CM

## 2019-12-17 DIAGNOSIS — O34.219 PREVIOUS CESAREAN DELIVERY, ANTEPARTUM CONDITION OR COMPLICATION: ICD-10-CM

## 2019-12-17 PROCEDURE — 90471 IMMUNIZATION ADMIN: CPT | Performed by: OBSTETRICS & GYNECOLOGY

## 2019-12-17 PROCEDURE — 90715 TDAP VACCINE 7 YRS/> IM: CPT | Performed by: OBSTETRICS & GYNECOLOGY

## 2019-12-17 PROCEDURE — 99207 ZZC PRENATAL VISIT: CPT | Performed by: OBSTETRICS & GYNECOLOGY

## 2019-12-17 ASSESSMENT — MIFFLIN-ST. JEOR: SCORE: 1404.39

## 2019-12-17 NOTE — PROGRESS NOTES
Clinic Administered Medication Documentation    MEDICATION LIST:   Injectable Medication Documentation    Patient was given tdap. Prior to medication administration, verified patients identity using patient s name and date of birth. Please see MAR and medication order for additional information. Patient instructed to remain in clinic for 15 minutes.      Was entire vial of medication used? Yes  Vial/Syringe: Syringe  Expiration Date:  10/22/2021  Was this medication supplied by the patient? No

## 2019-12-17 NOTE — PROGRESS NOTES
31w3d  No complaints.  Baby is moving well.  Passed the GTT but numbers were close to the cutoff.  discussed diet and weight gain.  May have some glc intolerance.   Has repeat  section scheduled. Tdap done today.   RR

## 2019-12-17 NOTE — Clinical Note
Patient is asking to have us help her get set up for a hospital tour.  She does not speak English well.  Please have an interpretor help her get that set up. Thanks.  No interpretor at visit today.

## 2020-01-07 ENCOUNTER — PRENATAL OFFICE VISIT (OUTPATIENT)
Dept: OBGYN | Facility: CLINIC | Age: 35
End: 2020-01-07
Payer: COMMERCIAL

## 2020-01-07 VITALS
WEIGHT: 169 LBS | OXYGEN SATURATION: 98 % | TEMPERATURE: 98.3 F | HEIGHT: 62 IN | SYSTOLIC BLOOD PRESSURE: 101 MMHG | DIASTOLIC BLOOD PRESSURE: 67 MMHG | HEART RATE: 96 BPM | BODY MASS INDEX: 31.1 KG/M2

## 2020-01-07 DIAGNOSIS — O09.529 HIGH-RISK PREGNANCY, ELDERLY MULTIGRAVIDA, UNSPECIFIED TRIMESTER: Primary | ICD-10-CM

## 2020-01-07 DIAGNOSIS — K29.00 ACUTE GASTRITIS WITHOUT HEMORRHAGE, UNSPECIFIED GASTRITIS TYPE: ICD-10-CM

## 2020-01-07 DIAGNOSIS — O34.219 PREVIOUS CESAREAN DELIVERY, ANTEPARTUM CONDITION OR COMPLICATION: ICD-10-CM

## 2020-01-07 LAB
ERYTHROCYTE [DISTWIDTH] IN BLOOD BY AUTOMATED COUNT: 14.5 % (ref 10–15)
HCT VFR BLD AUTO: 38.8 % (ref 35–47)
HGB BLD-MCNC: 12.6 G/DL (ref 11.7–15.7)
MCH RBC QN AUTO: 29 PG (ref 26.5–33)
MCHC RBC AUTO-ENTMCNC: 32.5 G/DL (ref 31.5–36.5)
MCV RBC AUTO: 89 FL (ref 78–100)
PLATELET # BLD AUTO: 165 10E9/L (ref 150–450)
RBC # BLD AUTO: 4.34 10E12/L (ref 3.8–5.2)
WBC # BLD AUTO: 6.4 10E9/L (ref 4–11)

## 2020-01-07 PROCEDURE — 99213 OFFICE O/P EST LOW 20 MIN: CPT | Performed by: OBSTETRICS & GYNECOLOGY

## 2020-01-07 PROCEDURE — 84450 TRANSFERASE (AST) (SGOT): CPT | Performed by: OBSTETRICS & GYNECOLOGY

## 2020-01-07 PROCEDURE — 36415 COLL VENOUS BLD VENIPUNCTURE: CPT | Performed by: OBSTETRICS & GYNECOLOGY

## 2020-01-07 PROCEDURE — 84460 ALANINE AMINO (ALT) (SGPT): CPT | Performed by: OBSTETRICS & GYNECOLOGY

## 2020-01-07 PROCEDURE — 85027 COMPLETE CBC AUTOMATED: CPT | Performed by: OBSTETRICS & GYNECOLOGY

## 2020-01-07 ASSESSMENT — MIFFLIN-ST. JEOR: SCORE: 1409.83

## 2020-01-07 NOTE — LETTER
January 8, 2020      Thea Rodriguez  [address] [id number]        Dear ,    We are writing to inform you of your test results.    Your test results fall within the expected range(s) or remain unchanged from previous results.  Please continue with current treatment plan.    Resulted Orders   ALT   Result Value Ref Range    ALT 19 0 - 50 U/L   AST   Result Value Ref Range    AST 15 0 - 45 U/L   CBC with platelets   Result Value Ref Range    WBC 6.4 4.0 - 11.0 10e9/L    RBC Count 4.34 3.8 - 5.2 10e12/L    Hemoglobin 12.6 11.7 - 15.7 g/dL    Hematocrit 38.8 35.0 - 47.0 %    MCV 89 78 - 100 fl    MCH 29.0 26.5 - 33.0 pg    MCHC 32.5 31.5 - 36.5 g/dL    RDW 14.5 10.0 - 15.0 %    Platelet Count 165 150 - 450 10e9/L       If you have any questions or concerns, please call the clinic at the number listed above.       Sincerely,        Rajni Meraz MD

## 2020-01-07 NOTE — PROGRESS NOTES
34w3d  Here with interpretor.  complains of LUQ stomach pain going on intermittently for the past month.  Seems worse at night.  Has not tried anything for it.  Takes her vitamins before bed at night.   discussed need to take vitamins with evening meal and avoid a lot of liquids right before bed.  On exam LUQ non tender.  No rebound or guarding over epigastric area.  A: Suspect gastritis or reflux.  P: rx for omeprazole. Will check LFT's today as well.    Baby is moving well and no other complaints.  RR

## 2020-01-08 LAB
ALT SERPL W P-5'-P-CCNC: 19 U/L (ref 0–50)
AST SERPL W P-5'-P-CCNC: 15 U/L (ref 0–45)

## 2020-01-23 ENCOUNTER — PRENATAL OFFICE VISIT (OUTPATIENT)
Dept: OBGYN | Facility: CLINIC | Age: 35
End: 2020-01-23
Payer: COMMERCIAL

## 2020-01-23 VITALS
BODY MASS INDEX: 31.54 KG/M2 | SYSTOLIC BLOOD PRESSURE: 121 MMHG | HEART RATE: 121 BPM | DIASTOLIC BLOOD PRESSURE: 77 MMHG | OXYGEN SATURATION: 95 % | WEIGHT: 171.4 LBS | TEMPERATURE: 98.5 F | HEIGHT: 62 IN

## 2020-01-23 DIAGNOSIS — O09.529 HIGH-RISK PREGNANCY, ELDERLY MULTIGRAVIDA, UNSPECIFIED TRIMESTER: Primary | ICD-10-CM

## 2020-01-23 LAB — HGB BLD-MCNC: 12.9 G/DL (ref 11.7–15.7)

## 2020-01-23 PROCEDURE — 00000218 ZZHCL STATISTIC OBHBG - HEMOGLOBIN: Performed by: OBSTETRICS & GYNECOLOGY

## 2020-01-23 PROCEDURE — 99207 ZZC PRENATAL VISIT: CPT | Performed by: OBSTETRICS & GYNECOLOGY

## 2020-01-23 PROCEDURE — 36415 COLL VENOUS BLD VENIPUNCTURE: CPT | Performed by: OBSTETRICS & GYNECOLOGY

## 2020-01-23 PROCEDURE — 87653 STREP B DNA AMP PROBE: CPT | Performed by: OBSTETRICS & GYNECOLOGY

## 2020-01-23 ASSESSMENT — PATIENT HEALTH QUESTIONNAIRE - PHQ9
5. POOR APPETITE OR OVEREATING: NOT AT ALL
SUM OF ALL RESPONSES TO PHQ QUESTIONS 1-9: 0

## 2020-01-23 ASSESSMENT — ANXIETY QUESTIONNAIRES
GAD7 TOTAL SCORE: 0
6. BECOMING EASILY ANNOYED OR IRRITABLE: NOT AT ALL
1. FEELING NERVOUS, ANXIOUS, OR ON EDGE: NOT AT ALL
5. BEING SO RESTLESS THAT IT IS HARD TO SIT STILL: NOT AT ALL
7. FEELING AFRAID AS IF SOMETHING AWFUL MIGHT HAPPEN: NOT AT ALL
3. WORRYING TOO MUCH ABOUT DIFFERENT THINGS: NOT AT ALL
2. NOT BEING ABLE TO STOP OR CONTROL WORRYING: NOT AT ALL
IF YOU CHECKED OFF ANY PROBLEMS ON THIS QUESTIONNAIRE, HOW DIFFICULT HAVE THESE PROBLEMS MADE IT FOR YOU TO DO YOUR WORK, TAKE CARE OF THINGS AT HOME, OR GET ALONG WITH OTHER PEOPLE: NOT DIFFICULT AT ALL

## 2020-01-23 ASSESSMENT — MIFFLIN-ST. JEOR: SCORE: 1420.72

## 2020-01-23 NOTE — PROGRESS NOTES
36w5d  No complaints.  Bedside US shows cephalic. Patient reports normal FM.    GBS and hgb done today.  Has scheduled  section at 39 wks.  discussed weekly visits until then.  RR

## 2020-01-24 LAB
GP B STREP DNA SPEC QL NAA+PROBE: NEGATIVE
SPECIMEN SOURCE: NORMAL

## 2020-01-24 ASSESSMENT — ANXIETY QUESTIONNAIRES: GAD7 TOTAL SCORE: 0

## 2020-02-04 ENCOUNTER — PRENATAL OFFICE VISIT (OUTPATIENT)
Dept: OBGYN | Facility: CLINIC | Age: 35
End: 2020-02-04
Payer: COMMERCIAL

## 2020-02-04 VITALS
SYSTOLIC BLOOD PRESSURE: 116 MMHG | BODY MASS INDEX: 31.5 KG/M2 | OXYGEN SATURATION: 98 % | WEIGHT: 171.2 LBS | DIASTOLIC BLOOD PRESSURE: 81 MMHG | HEART RATE: 98 BPM | TEMPERATURE: 97.9 F | HEIGHT: 62 IN

## 2020-02-04 DIAGNOSIS — Z01.818 PRE-OP EXAM: Primary | ICD-10-CM

## 2020-02-04 DIAGNOSIS — O34.219 PREVIOUS CESAREAN DELIVERY, ANTEPARTUM CONDITION OR COMPLICATION: ICD-10-CM

## 2020-02-04 DIAGNOSIS — O09.529 HIGH-RISK PREGNANCY, ELDERLY MULTIGRAVIDA, UNSPECIFIED TRIMESTER: Primary | ICD-10-CM

## 2020-02-04 PROCEDURE — 99207 ZZC PRENATAL VISIT: CPT | Performed by: OBSTETRICS & GYNECOLOGY

## 2020-02-04 ASSESSMENT — MIFFLIN-ST. JEOR: SCORE: 1419.81

## 2020-02-04 NOTE — PROGRESS NOTES
38w3d  Feeling intermittent ctx's. Baby is moving well.  Has noted some increased vaginal discharge.  On spec exam no pool of fluid, just leukorrhea.   Cervix closed.  GBS negative.  hgb 12.   discussed baby doc and she is still not sure.  Has been seeing Dr Cooper at [facility] but wants to switch to a doc closer to where she lives.    No FMLA papers needed.  Plans to breast feed.  Plans to get a breast pump in the hospital.  RR

## 2020-02-10 DIAGNOSIS — Z01.818 PRE-OP EXAM: ICD-10-CM

## 2020-02-10 DIAGNOSIS — O34.219 PREVIOUS CESAREAN DELIVERY, ANTEPARTUM CONDITION OR COMPLICATION: ICD-10-CM

## 2020-02-10 LAB
ABO + RH BLD: NORMAL
ABO + RH BLD: NORMAL
BLD GP AB SCN SERPL QL: NORMAL
BLOOD BANK CMNT PATIENT-IMP: NORMAL
ERYTHROCYTE [DISTWIDTH] IN BLOOD BY AUTOMATED COUNT: 15 % (ref 10–15)
HCT VFR BLD AUTO: 41.3 % (ref 35–47)
HGB BLD-MCNC: 13.5 G/DL (ref 11.7–15.7)
MCH RBC QN AUTO: 29.4 PG (ref 26.5–33)
MCHC RBC AUTO-ENTMCNC: 32.7 G/DL (ref 31.5–36.5)
MCV RBC AUTO: 90 FL (ref 78–100)
PLATELET # BLD AUTO: 174 10E9/L (ref 150–450)
RBC # BLD AUTO: 4.59 10E12/L (ref 3.8–5.2)
SPECIMEN EXP DATE BLD: NORMAL
WBC # BLD AUTO: 6.9 10E9/L (ref 4–11)

## 2020-02-10 PROCEDURE — 86850 RBC ANTIBODY SCREEN: CPT | Performed by: OBSTETRICS & GYNECOLOGY

## 2020-02-10 PROCEDURE — 85027 COMPLETE CBC AUTOMATED: CPT | Performed by: OBSTETRICS & GYNECOLOGY

## 2020-02-10 PROCEDURE — 86900 BLOOD TYPING SEROLOGIC ABO: CPT | Performed by: OBSTETRICS & GYNECOLOGY

## 2020-02-10 PROCEDURE — 86901 BLOOD TYPING SEROLOGIC RH(D): CPT | Performed by: OBSTETRICS & GYNECOLOGY

## 2020-02-10 PROCEDURE — 36415 COLL VENOUS BLD VENIPUNCTURE: CPT | Performed by: OBSTETRICS & GYNECOLOGY

## 2020-02-10 PROCEDURE — 86780 TREPONEMA PALLIDUM: CPT | Performed by: OBSTETRICS & GYNECOLOGY

## 2020-02-11 ENCOUNTER — ANESTHESIA EVENT (OUTPATIENT)
Dept: OBGYN | Facility: CLINIC | Age: 35
End: 2020-02-11
Payer: COMMERCIAL

## 2020-02-11 LAB — T PALLIDUM AB SER QL: NONREACTIVE

## 2020-02-12 ENCOUNTER — HOSPITAL ENCOUNTER (INPATIENT)
Facility: CLINIC | Age: 35
LOS: 3 days | Discharge: HOME HEALTH | End: 2020-02-15
Attending: OBSTETRICS & GYNECOLOGY | Admitting: OBSTETRICS & GYNECOLOGY
Payer: COMMERCIAL

## 2020-02-12 ENCOUNTER — ANESTHESIA (OUTPATIENT)
Dept: OBGYN | Facility: CLINIC | Age: 35
End: 2020-02-12
Payer: COMMERCIAL

## 2020-02-12 DIAGNOSIS — O09.529 HIGH-RISK PREGNANCY, ELDERLY MULTIGRAVIDA, UNSPECIFIED TRIMESTER: ICD-10-CM

## 2020-02-12 DIAGNOSIS — Z98.891 STATUS POST CESAREAN SECTION: Primary | ICD-10-CM

## 2020-02-12 DIAGNOSIS — O34.219 PREVIOUS CESAREAN DELIVERY, ANTEPARTUM CONDITION OR COMPLICATION: ICD-10-CM

## 2020-02-12 PROCEDURE — 25800030 ZZH RX IP 258 OP 636: Performed by: STUDENT IN AN ORGANIZED HEALTH CARE EDUCATION/TRAINING PROGRAM

## 2020-02-12 PROCEDURE — 37000009 ZZH ANESTHESIA TECHNICAL FEE, EACH ADDTL 15 MIN: Performed by: OBSTETRICS & GYNECOLOGY

## 2020-02-12 PROCEDURE — 37000008 ZZH ANESTHESIA TECHNICAL FEE, 1ST 30 MIN: Performed by: OBSTETRICS & GYNECOLOGY

## 2020-02-12 PROCEDURE — 25000128 H RX IP 250 OP 636: Performed by: ANESTHESIOLOGY

## 2020-02-12 PROCEDURE — 27210794 ZZH OR GENERAL SUPPLY STERILE: Performed by: OBSTETRICS & GYNECOLOGY

## 2020-02-12 PROCEDURE — C9290 INJ, BUPIVACAINE LIPOSOME: HCPCS | Performed by: ANESTHESIOLOGY

## 2020-02-12 PROCEDURE — 36000059 ZZH SURGERY LEVEL 3 EA 15 ADDTL MIN UMMC: Performed by: OBSTETRICS & GYNECOLOGY

## 2020-02-12 PROCEDURE — C1765 ADHESION BARRIER: HCPCS | Performed by: OBSTETRICS & GYNECOLOGY

## 2020-02-12 PROCEDURE — 25000128 H RX IP 250 OP 636: Performed by: STUDENT IN AN ORGANIZED HEALTH CARE EDUCATION/TRAINING PROGRAM

## 2020-02-12 PROCEDURE — 25000125 ZZHC RX 250: Performed by: STUDENT IN AN ORGANIZED HEALTH CARE EDUCATION/TRAINING PROGRAM

## 2020-02-12 PROCEDURE — 25000132 ZZH RX MED GY IP 250 OP 250 PS 637: Performed by: STUDENT IN AN ORGANIZED HEALTH CARE EDUCATION/TRAINING PROGRAM

## 2020-02-12 PROCEDURE — 71000015 ZZH RECOVERY PHASE 1 LEVEL 2 EA ADDTL HR: Performed by: OBSTETRICS & GYNECOLOGY

## 2020-02-12 PROCEDURE — 71000014 ZZH RECOVERY PHASE 1 LEVEL 2 FIRST HR: Performed by: OBSTETRICS & GYNECOLOGY

## 2020-02-12 PROCEDURE — 59510 CESAREAN DELIVERY: CPT | Mod: GC | Performed by: OBSTETRICS & GYNECOLOGY

## 2020-02-12 PROCEDURE — 27110028 ZZH OR GENERAL SUPPLY NON-STERILE: Performed by: OBSTETRICS & GYNECOLOGY

## 2020-02-12 PROCEDURE — 36000057 ZZH SURGERY LEVEL 3 1ST 30 MIN - UMMC: Performed by: OBSTETRICS & GYNECOLOGY

## 2020-02-12 PROCEDURE — 40000170 ZZH STATISTIC PRE-PROCEDURE ASSESSMENT II: Performed by: OBSTETRICS & GYNECOLOGY

## 2020-02-12 PROCEDURE — 12000001 ZZH R&B MED SURG/OB UMMC

## 2020-02-12 RX ORDER — SODIUM CHLORIDE, SODIUM LACTATE, POTASSIUM CHLORIDE, CALCIUM CHLORIDE 600; 310; 30; 20 MG/100ML; MG/100ML; MG/100ML; MG/100ML
INJECTION, SOLUTION INTRAVENOUS CONTINUOUS PRN
Status: DISCONTINUED | OUTPATIENT
Start: 2020-02-12 | End: 2020-02-12

## 2020-02-12 RX ORDER — OXYTOCIN/0.9 % SODIUM CHLORIDE 30/500 ML
100 PLASTIC BAG, INJECTION (ML) INTRAVENOUS CONTINUOUS
Status: DISCONTINUED | OUTPATIENT
Start: 2020-02-12 | End: 2020-02-15 | Stop reason: HOSPADM

## 2020-02-12 RX ORDER — FENTANYL CITRATE 50 UG/ML
INJECTION, SOLUTION INTRAMUSCULAR; INTRAVENOUS PRN
Status: DISCONTINUED | OUTPATIENT
Start: 2020-02-12 | End: 2020-02-12

## 2020-02-12 RX ORDER — IBUPROFEN 800 MG/1
800 TABLET, FILM COATED ORAL EVERY 6 HOURS PRN
Status: DISCONTINUED | OUTPATIENT
Start: 2020-02-13 | End: 2020-02-15 | Stop reason: HOSPADM

## 2020-02-12 RX ORDER — CITRIC ACID/SODIUM CITRATE 334-500MG
30 SOLUTION, ORAL ORAL
Status: COMPLETED | OUTPATIENT
Start: 2020-02-12 | End: 2020-02-12

## 2020-02-12 RX ORDER — CEFAZOLIN SODIUM 1 G/3ML
1 INJECTION, POWDER, FOR SOLUTION INTRAMUSCULAR; INTRAVENOUS SEE ADMIN INSTRUCTIONS
Status: DISCONTINUED | OUTPATIENT
Start: 2020-02-12 | End: 2020-02-12 | Stop reason: HOSPADM

## 2020-02-12 RX ORDER — HYDROCORTISONE 2.5 %
CREAM (GRAM) TOPICAL 3 TIMES DAILY PRN
Status: DISCONTINUED | OUTPATIENT
Start: 2020-02-12 | End: 2020-02-15 | Stop reason: HOSPADM

## 2020-02-12 RX ORDER — PHENYLEPHRINE HCL IN 0.9% NACL 1 MG/10 ML
SYRINGE (ML) INTRAVENOUS CONTINUOUS PRN
Status: DISCONTINUED | OUTPATIENT
Start: 2020-02-12 | End: 2020-02-12

## 2020-02-12 RX ORDER — AMOXICILLIN 250 MG
2 CAPSULE ORAL 2 TIMES DAILY PRN
Status: DISCONTINUED | OUTPATIENT
Start: 2020-02-12 | End: 2020-02-14

## 2020-02-12 RX ORDER — BUPIVACAINE HYDROCHLORIDE 2.5 MG/ML
INJECTION, SOLUTION EPIDURAL; INFILTRATION; INTRACAUDAL PRN
Status: DISCONTINUED | OUTPATIENT
Start: 2020-02-12 | End: 2020-02-12

## 2020-02-12 RX ORDER — OXYTOCIN 10 [USP'U]/ML
10 INJECTION, SOLUTION INTRAMUSCULAR; INTRAVENOUS
Status: DISCONTINUED | OUTPATIENT
Start: 2020-02-12 | End: 2020-02-15 | Stop reason: HOSPADM

## 2020-02-12 RX ORDER — CITRIC ACID/SODIUM CITRATE 334-500MG
SOLUTION, ORAL ORAL
Status: DISCONTINUED
Start: 2020-02-12 | End: 2020-02-12 | Stop reason: HOSPADM

## 2020-02-12 RX ORDER — LIDOCAINE 40 MG/G
CREAM TOPICAL
Status: DISCONTINUED | OUTPATIENT
Start: 2020-02-12 | End: 2020-02-15 | Stop reason: HOSPADM

## 2020-02-12 RX ORDER — HYDROMORPHONE HYDROCHLORIDE 1 MG/ML
.3-.5 INJECTION, SOLUTION INTRAMUSCULAR; INTRAVENOUS; SUBCUTANEOUS EVERY 5 MIN PRN
Status: DISCONTINUED | OUTPATIENT
Start: 2020-02-12 | End: 2020-02-12 | Stop reason: HOSPADM

## 2020-02-12 RX ORDER — NALOXONE HYDROCHLORIDE 0.4 MG/ML
.1-.4 INJECTION, SOLUTION INTRAMUSCULAR; INTRAVENOUS; SUBCUTANEOUS
Status: DISCONTINUED | OUTPATIENT
Start: 2020-02-12 | End: 2020-02-15 | Stop reason: HOSPADM

## 2020-02-12 RX ORDER — ONDANSETRON 2 MG/ML
4 INJECTION INTRAMUSCULAR; INTRAVENOUS EVERY 6 HOURS PRN
Status: DISCONTINUED | OUTPATIENT
Start: 2020-02-12 | End: 2020-02-15 | Stop reason: HOSPADM

## 2020-02-12 RX ORDER — OXYTOCIN/0.9 % SODIUM CHLORIDE 30/500 ML
PLASTIC BAG, INJECTION (ML) INTRAVENOUS CONTINUOUS PRN
Status: DISCONTINUED | OUTPATIENT
Start: 2020-02-12 | End: 2020-02-12

## 2020-02-12 RX ORDER — ONDANSETRON 4 MG/1
4 TABLET, ORALLY DISINTEGRATING ORAL EVERY 30 MIN PRN
Status: DISCONTINUED | OUTPATIENT
Start: 2020-02-12 | End: 2020-02-12 | Stop reason: HOSPADM

## 2020-02-12 RX ORDER — EPHEDRINE SULFATE 50 MG/ML
INJECTION, SOLUTION INTRAMUSCULAR; INTRAVENOUS; SUBCUTANEOUS PRN
Status: DISCONTINUED | OUTPATIENT
Start: 2020-02-12 | End: 2020-02-12

## 2020-02-12 RX ORDER — KETOROLAC TROMETHAMINE 30 MG/ML
30 INJECTION, SOLUTION INTRAMUSCULAR; INTRAVENOUS EVERY 6 HOURS
Status: COMPLETED | OUTPATIENT
Start: 2020-02-12 | End: 2020-02-13

## 2020-02-12 RX ORDER — NALOXONE HYDROCHLORIDE 0.4 MG/ML
.1-.4 INJECTION, SOLUTION INTRAMUSCULAR; INTRAVENOUS; SUBCUTANEOUS
Status: DISCONTINUED | OUTPATIENT
Start: 2020-02-12 | End: 2020-02-12

## 2020-02-12 RX ORDER — SIMETHICONE 80 MG
80 TABLET,CHEWABLE ORAL 4 TIMES DAILY PRN
Status: DISCONTINUED | OUTPATIENT
Start: 2020-02-12 | End: 2020-02-15 | Stop reason: HOSPADM

## 2020-02-12 RX ORDER — FENTANYL CITRATE 50 UG/ML
25-50 INJECTION, SOLUTION INTRAMUSCULAR; INTRAVENOUS
Status: DISCONTINUED | OUTPATIENT
Start: 2020-02-12 | End: 2020-02-12 | Stop reason: HOSPADM

## 2020-02-12 RX ORDER — ACETAMINOPHEN 325 MG/1
650 TABLET ORAL EVERY 6 HOURS PRN
Status: DISCONTINUED | OUTPATIENT
Start: 2020-02-12 | End: 2020-02-12

## 2020-02-12 RX ORDER — ACETAMINOPHEN 325 MG/1
975 TABLET ORAL EVERY 8 HOURS PRN
Status: DISCONTINUED | OUTPATIENT
Start: 2020-02-12 | End: 2020-02-15 | Stop reason: HOSPADM

## 2020-02-12 RX ORDER — SODIUM CHLORIDE, SODIUM LACTATE, POTASSIUM CHLORIDE, CALCIUM CHLORIDE 600; 310; 30; 20 MG/100ML; MG/100ML; MG/100ML; MG/100ML
INJECTION, SOLUTION INTRAVENOUS CONTINUOUS
Status: DISCONTINUED | OUTPATIENT
Start: 2020-02-12 | End: 2020-02-12 | Stop reason: HOSPADM

## 2020-02-12 RX ORDER — BISACODYL 10 MG
10 SUPPOSITORY, RECTAL RECTAL DAILY PRN
Status: DISCONTINUED | OUTPATIENT
Start: 2020-02-14 | End: 2020-02-15 | Stop reason: HOSPADM

## 2020-02-12 RX ORDER — AMOXICILLIN 250 MG
1 CAPSULE ORAL 2 TIMES DAILY PRN
Status: DISCONTINUED | OUTPATIENT
Start: 2020-02-12 | End: 2020-02-14

## 2020-02-12 RX ORDER — BUPIVACAINE HYDROCHLORIDE 7.5 MG/ML
INJECTION, SOLUTION INTRASPINAL PRN
Status: DISCONTINUED | OUTPATIENT
Start: 2020-02-12 | End: 2020-02-12

## 2020-02-12 RX ORDER — CEFAZOLIN SODIUM 2 G/100ML
2 INJECTION, SOLUTION INTRAVENOUS
Status: COMPLETED | OUTPATIENT
Start: 2020-02-12 | End: 2020-02-12

## 2020-02-12 RX ORDER — OXYTOCIN/0.9 % SODIUM CHLORIDE 30/500 ML
340 PLASTIC BAG, INJECTION (ML) INTRAVENOUS CONTINUOUS PRN
Status: DISCONTINUED | OUTPATIENT
Start: 2020-02-12 | End: 2020-02-15 | Stop reason: HOSPADM

## 2020-02-12 RX ORDER — ONDANSETRON 2 MG/ML
4 INJECTION INTRAMUSCULAR; INTRAVENOUS EVERY 30 MIN PRN
Status: DISCONTINUED | OUTPATIENT
Start: 2020-02-12 | End: 2020-02-12 | Stop reason: HOSPADM

## 2020-02-12 RX ORDER — ONDANSETRON 2 MG/ML
INJECTION INTRAMUSCULAR; INTRAVENOUS PRN
Status: DISCONTINUED | OUTPATIENT
Start: 2020-02-12 | End: 2020-02-12

## 2020-02-12 RX ORDER — OXYCODONE HYDROCHLORIDE 5 MG/1
5-10 TABLET ORAL EVERY 4 HOURS PRN
Status: DISCONTINUED | OUTPATIENT
Start: 2020-02-12 | End: 2020-02-15 | Stop reason: HOSPADM

## 2020-02-12 RX ORDER — LANOLIN 100 %
OINTMENT (GRAM) TOPICAL
Status: DISCONTINUED | OUTPATIENT
Start: 2020-02-12 | End: 2020-02-15 | Stop reason: HOSPADM

## 2020-02-12 RX ORDER — MORPHINE SULFATE 1 MG/ML
INJECTION, SOLUTION EPIDURAL; INTRATHECAL; INTRAVENOUS PRN
Status: DISCONTINUED | OUTPATIENT
Start: 2020-02-12 | End: 2020-02-12

## 2020-02-12 RX ORDER — DEXTROSE, SODIUM CHLORIDE, SODIUM LACTATE, POTASSIUM CHLORIDE, AND CALCIUM CHLORIDE 5; .6; .31; .03; .02 G/100ML; G/100ML; G/100ML; G/100ML; G/100ML
INJECTION, SOLUTION INTRAVENOUS CONTINUOUS
Status: DISCONTINUED | OUTPATIENT
Start: 2020-02-12 | End: 2020-02-15 | Stop reason: HOSPADM

## 2020-02-12 RX ADMIN — SODIUM CHLORIDE, POTASSIUM CHLORIDE, SODIUM LACTATE AND CALCIUM CHLORIDE: 600; 310; 30; 20 INJECTION, SOLUTION INTRAVENOUS at 12:58

## 2020-02-12 RX ADMIN — PHENYLEPHRINE HYDROCHLORIDE 150 MCG: 10 INJECTION INTRAVENOUS at 14:04

## 2020-02-12 RX ADMIN — Medication 5 MG: at 14:02

## 2020-02-12 RX ADMIN — SODIUM CITRATE AND CITRIC ACID MONOHYDRATE 30 ML: 500; 334 SOLUTION ORAL at 10:29

## 2020-02-12 RX ADMIN — PHENYLEPHRINE HYDROCHLORIDE 150 MCG: 10 INJECTION INTRAVENOUS at 14:00

## 2020-02-12 RX ADMIN — PHENYLEPHRINE HYDROCHLORIDE 100 MCG: 10 INJECTION INTRAVENOUS at 13:46

## 2020-02-12 RX ADMIN — PHENYLEPHRINE HYDROCHLORIDE 100 MCG: 10 INJECTION INTRAVENOUS at 13:51

## 2020-02-12 RX ADMIN — ONDANSETRON 4 MG: 2 INJECTION INTRAMUSCULAR; INTRAVENOUS at 14:01

## 2020-02-12 RX ADMIN — BUPIVACAINE HYDROCHLORIDE 20 ML: 2.5 INJECTION, SOLUTION EPIDURAL; INFILTRATION; INTRACAUDAL at 14:40

## 2020-02-12 RX ADMIN — MORPHINE SULFATE 0.1 MG: 1 INJECTION, SOLUTION EPIDURAL; INTRATHECAL; INTRAVENOUS at 13:13

## 2020-02-12 RX ADMIN — KETOROLAC TROMETHAMINE 30 MG: 30 INJECTION, SOLUTION INTRAMUSCULAR at 22:03

## 2020-02-12 RX ADMIN — BUPIVACAINE 20 MG: 13.3 INJECTION, SUSPENSION, LIPOSOMAL INFILTRATION at 14:40

## 2020-02-12 RX ADMIN — ONDANSETRON 4 MG: 2 INJECTION INTRAMUSCULAR; INTRAVENOUS at 17:11

## 2020-02-12 RX ADMIN — Medication 100 ML/HR: at 16:14

## 2020-02-12 RX ADMIN — OXYTOCIN-SODIUM CHLORIDE 0.9% IV SOLN 30 UNIT/500ML 300 ML/HR: 30-0.9/5 SOLUTION at 13:42

## 2020-02-12 RX ADMIN — ACETAMINOPHEN 650 MG: 325 TABLET, FILM COATED ORAL at 17:30

## 2020-02-12 RX ADMIN — CEFAZOLIN SODIUM 2 G: 2 INJECTION, SOLUTION INTRAVENOUS at 13:14

## 2020-02-12 RX ADMIN — KETOROLAC TROMETHAMINE 30 MG: 30 INJECTION, SOLUTION INTRAMUSCULAR at 16:03

## 2020-02-12 RX ADMIN — Medication 50 MCG/MIN: at 13:14

## 2020-02-12 RX ADMIN — SODIUM CHLORIDE, POTASSIUM CHLORIDE, SODIUM LACTATE AND CALCIUM CHLORIDE 1000 ML: 600; 310; 30; 20 INJECTION, SOLUTION INTRAVENOUS at 09:04

## 2020-02-12 RX ADMIN — FENTANYL CITRATE 10 MCG: 50 INJECTION, SOLUTION INTRAMUSCULAR; INTRAVENOUS at 13:13

## 2020-02-12 RX ADMIN — BUPIVACAINE HYDROCHLORIDE IN DEXTROSE 1.6 ML: 7.5 INJECTION, SOLUTION SUBARACHNOID at 13:13

## 2020-02-12 ASSESSMENT — MIFFLIN-ST. JEOR: SCORE: 1418.9

## 2020-02-12 NOTE — ANESTHESIA POSTPROCEDURE EVALUATION
Anesthesia POST Procedure Evaluation    Patient: Thea Rodriguez   MRN:     [id number] Gender:   female   Age:    34 year old :      1985        Preoperative Diagnosis: Previous  delivery, antepartum condition or complication [O34.219]  High-risk pregnancy, elderly multigravida, unspecified trimester [O09.529]   Procedure(s):   SECTION   Postop Comments: No value filed.       Anesthesia Type:  Not documented  Spinal, Peripheral Nerve Block    Reportable Event: NO     PAIN: Uncomplicated   Sign Out status: Comfortable, Well controlled pain     PONV: No PONV   Sign Out status:  No Nausea or Vomiting     Neuro/Psych: Uneventful perioperative course   Sign Out Status: Preoperative baseline; Age appropriate mentation     Airway/Resp.: Uneventful perioperative course   Sign Out Status: Non labored breathing, age appropriate RR; Resp. Status within EXPECTED Parameters     CV: Uneventful perioperative course   Sign Out status: Appropriate BP and perfusion indices; Appropriate HR/Rhythm     Disposition:   Sign Out in:  PACU  Disposition:  Phase II; Home  Recovery Course: Uneventful  Follow-Up: Not required           Last Anesthesia Record Vitals:  CRNA VITALS  2020 1423 - 2020 1507      2020             EKG:  Sinus rhythm          Last PACU Vitals:  Vitals Value Taken Time   /67 2020  3:00 PM   Temp 36.4  C (97.6  F) 2020  3:00 PM   Pulse 81 2020  2:55 PM   Resp 18 2020  3:06 PM   SpO2 99 % 2020  3:06 PM   Temp src     NIBP 99/46 2020  2:43 PM   Pulse 70 2020  2:42 PM   SpO2 99 % 2020  2:42 PM   Resp     Temp     Ht Rate 72 2020  2:42 PM   Temp 2     Vitals shown include unvalidated device data.      Electronically Signed By: Thi Luis MD, 2020, 3:07 PM

## 2020-02-12 NOTE — PLAN OF CARE
Transfer from OB PACU to Room 7142 at 1635  Transferred to bed via cart  Thea Rodriguez,  34 year old F s/p repeat C/S  Anesthesia Type:  Spinal  Surgeon: Dr. Meraz and Dr. Membreno  Allergies: NKDA  Pertinent Medical History: see medical record   QBL: 785   IVF: 2nd bag of IV pitocin running upon transfer  UOP: 275 mL in PACU   NPO: No   Vomiting: Yes. Emesis x 1 in PACU. Relieved with cold cloth. Able to tolerate PO intake afterwards.  Drainage:  UTV  Pain: Patient denies pain from surgery. Reported mild HA.   Airway: SPO2 100 on room air at time of transfer   See PACU record for ongoing assessment, vital signs and pain assessment.   Baby bands checked and verified and report given to KILEY Adams who now assumes care of patient.

## 2020-02-12 NOTE — PLAN OF CARE
Scheduled AM C/S Admit Note  Thea Rodriguez  MRN: [id number]  Gestational Age: 39w4d      Thea Rodriguez presents for scheduled repeat  section.  Patient denies contractions, bleeding or LOF.  NPO since midnight.    Past Medical History:   Diagnosis Date    NO ACTIVE PROBLEMS      Past Surgical History:   Procedure Laterality Date     SECTION         Dr Luevano and Dr Membreno aware of patient's arrival and condition.    FHT: 135  NST: Reactive.    Plan:  - section at 1030.

## 2020-02-12 NOTE — H&P
History and Physical     Thea Rodriguez MRN# [id number]   YOB: 1985 Age: 34 year old      Date of Admission: 2020    Primary care provider: Vi Castaneda       HPI:     Thea Rodriguez is a 34 year old  at 39w4d by LMP c/w 17w5d US who presents today for scheduled repeat  section. Feeling anxious and excited for today.    Pregnancy notable for:   History of  section x1  Failed GCT, passed GTT  AMA  Child with T21    She reports good fetal movement. Denies LOF, vaginal bleeding, or contractions.  She denies fever, chills, SOB, chest pain, palpitations, N/V, LE swelling/tenderness.  No concerns for headache, vision changes, RUQ or epigastric pain      Patient has been NPO since before midnight    OB History:    OB History    Para Term  AB Living   3 1 1 0 1 1   SAB TAB Ectopic Multiple Live Births   1 0 0 0 1      # Outcome Date GA Lbr Kranthi/2nd Weight Sex Delivery Anes PTL Lv   3 Current            2 Term 2017   3.402 kg (7 lb 8 oz)  CS-Unspec   PACO      Name: Kidus   1 SAB                 Prenatal Lab Results:  Lab Results   Component Value Date    ABO O 02/10/2020    RH Pos 02/10/2020    AS Neg 02/10/2020    HEPBANG Nonreactive 2019    HGB 13.5 02/10/2020       GBS Status:   Lab Results   Component Value Date    GBS Negative 2020                Past Medical History:     Past Medical History:   Diagnosis Date     NO ACTIVE PROBLEMS              Past Surgical History:     Past Surgical History:   Procedure Laterality Date      SECTION               Social History:     Social History     Tobacco Use     Smoking status: Never Smoker     Smokeless tobacco: Never Used   Substance Use Topics     Alcohol use: No     Frequency: Never             Family History:     Family History   Problem Relation Age of Onset     Diabetes Mother 50     No Known Problems Father      No Known Problems Brother      No Known Problems Sister      No Known  "Problems Brother      No Known Problems Brother      No Known Problems Brother      No Known Problems Sister      Breast Cancer No family hx of      Heart Disease No family hx of              Immunizations:     Immunization History   Administered Date(s) Administered     HepB-Adult 2018, 2018     TDAP Vaccine (Adacel) 2018, 2019            Allergies:   No Known Allergies          Medications:     Medications Prior to Admission   Medication Sig Dispense Refill Last Dose     Hydroquinone 1.5 % CREA    Not Taking     omeprazole (PRILOSEC) 20 MG DR capsule Take 1 capsule (20 mg) by mouth daily 30 capsule 3 Not Taking     Prenatal Vit-Fe Fumarate-FA (PRENATAL VITAMIN) 27-0.8 MG TABS Take 1 tablet by mouth daily 90 tablet 3 Taking     vitamin D3 (CHOLECALCIFEROL) 2000 units (50 mcg) tablet Take 1 tablet (2,000 Units) by mouth daily 100 tablet 3 Taking             Review of Systems & Physical Exam:     The Review of Systems is negative other than noted in the HPI      /64   Temp 98.3  F (36.8  C) (Oral)   Resp 16   Ht 1.575 m (5' 2\")   Wt 77.6 kg (171 lb)   LMP 2019 (Exact Date)   Breastfeeding No   BMI 31.28 kg/m    Gen: well-appearing, comfortable  CV: RRR  Lungs: non-labored breathing  Abd: Gravid, non-tender, non-distended  Ext: trace peripheral extremity edema    Presentation: Ceph by BSUS.  Estimated Fetal Weight: 7lb    FHT:  Monitoring External  FHT: Baseline 140 bpm; mon variability; accels present; no decelerations, Reactive NST  TOCO 0-1 contractions in 10 minutes         Data:   RPR, T&S, CBC     Assessment and Plan:       34 year old  at 39w4d by LMP c/w 17w5d US, here for scheduled CS. Pregnancy is notable for history of CS x1, child with T21, failed GCT passed GTT, AMA.     # History of child with T21  - Declined genetic screening    # Scheduled Repeat  Section  Indicated due to repeat. Prior (s) for arrest of dilation Category II FHT. Will " plan for a Pfannenstiel skin incision with low transverse hysterotomy  - Labs: CBC, T&S, RPR   - Pre-op Hgb 13.5, Plts 174  - Placenta: posterior  - Anesthesia: Spinal   - 2g Ancef   - PPH Meds/Ppx: prn  - Diet: NPO  - PPx: SCDs  - Consent: Discussed risks and benefits of procedure, including but not limited to bleeding, infection, injury to surrounding organs, injury to infant, and the potential need for another surgery should some injury go unrecognized or patient were to have continued bleeding. Patient had time to ask questions and expressed understanding of procedure and associated risks. Agreed to blood transfusion if necessary. Consent signed.     # PNC  - Rh pos, Rubella immune, GCT failed, passed GTT  - Other prenatal labs wnl  - s/p flu, Tdap vaccines  - Pap last 2019  - Feeding: breast     # FWB:   Cat I tracing, reactive; Ceph by BSUS; EFW 7lb  - Continuous Fetal Monitoring  - Intrauterine resuscitative measures prn      Patient discussed with Dr. Kt Membreno MD  Obstetrics & Gynecology, PGY-2  2020 8:22 AM    Physician Attestation   I, Rajni Meraz MD, personally examined and evaluated this patient.  I discussed the patient with the resident and care team, and agree with the assessment and plan of care as documented in the note above.   I personally reviewed vital signs, medications, labs, fetal heart tones and toco.  Key findings: Patient is here for a scheduled repeat  section. Fetal status is reassuring with a reactive NST.  Informed consent was obtained. Declines PPTL.   Rajni Meraz MD   2020

## 2020-02-12 NOTE — OP NOTE
[facility]  Full Operative Progress Note     Thea Rodriguez   1985  [id number]      Surgery Date:  2020    Surgeon:  Rajni Meraz MD    Assistants:  Joanne Membreno MD, PGY-2    Davie Cornelius, MS3     Pre-op Diagnosis:    Intrauterine pregnancy at 39w4d  History of  section x1  Failed GCT, passed GTT  AMA  Child with T21    Post-op Diagnosis:    Same   Liveborn female infant   Thin lower uterine segment    Procedure:  Repeat low transverse  section with double layer uterine closure via Pfannenstiel skin incision    Anesthesia: Spinal    EBL:  708 cc    IVF:  1000 mL crystalloid    UOP:  100 mL clear urine at the end of the case    Drains: Guy Catheter     Specimens:  Cord blood     Complications: None apparent    Indications:   Thea Rodriguez is a 34 year old  at 39w4d admitted for scheduled  section.  Pregnancy notable for previous  section with close spacing of pregnancy. (less than 12 month interval).  The risks, benefits, and alternatives of  section were discussed with the patient, and she agreed to proceed.     Findings:   - Viable female infant delivered at 1338 on 2020 with APGARs 9 and 9. Weight pending.    - Clear amniotic fluid, Placenta intact.   - Normal tubes and ovaries. Uterus with multiple small (2 cm ) fibroids across anterior uterine wall. There is a 4 x 4 cm vascular outpouching near the right cornua of the uterus. Scant rectofascial adhesions.   - Surgical sites noted to be hemostatic at the end of case.     Procedure Details:   The patient was brought to the OR, where adequate anesthesia was administered.  A guy catheter was placed.She was placed in the dorsal supine position with a slight leftward tilt. She was prepped and draped in the usual sterile fashion. A surgical time out was performed. A pfannenstiel skin incision was made with the scalpel, and carried down to the underlying  fascia with sharp and blunt dissection. The fascia was incised in the midline, and the incision was extended laterally with the Miner scissors. The superior aspect of the fascia was grasped with the Kocher clamps and dissected off of the underlying rectus muscles with blunt and sharp dissection. Attention was then turned to the inferior aspect of the fascia, which was similarly dissected off of the underlying rectus muscles. The rectus muscles were  in the midline, and the peritoneum was entered bluntly, and the opening was extended with digital pressure. The bladder blade was placed. A transverse hysterotomy was made with the scalpel in the lower uterine segment, and the incision was extended with digital pressure. The infant was noted to be in the OA position, and was delivered atraumatically. The shoulders delivered easily.  No nuchal cord was noted. The cord was doubly clamped and cut after 60 seconds, and the infant was handed off to the awaiting nursery staff. A segment of cord was cut and saved. The placenta was delivered with gentle traction on the umbilical cord and uterine massage. The uterus was exteriorized and cleared of all clots and debris. Uterine tone was noted to be firm with 20 units of pitocin given through the running IV and uterine massage.  The hysterotomy was closed with a running locked suture of 0 Vicryl.  The hysterotomy was then imbricated using an 0 Monocryl suture. The hysterotomy was noted to be hemostatic. The posterior cul-de-sac was irrigated and cleared of all clots and debris. The uterus was returned to the abdomen. The pericolic gutters were irrigated and cleared of all clots and debris. The hysterotomy was reexamined and noted to be hemostatic. The fascia and rectus muscles were examined and areas of oozing were controlled with electrocautery. Seprafilm was applied over the hysterotomy.The fascia was closed with a running 0 Vicryl suture. The subcutaneous tissue was  irrigated and areas of oozing were controlled with electrocautery. The subcutaneous tissue was less than 2 cm in thickness, and was therefore not closed. The skin was closed with 4-0 Monocryl and covered with a sterile dressing.    All sponge, needle, and instrument counts were correct. The patient tolerated the procedure well, and was transferred to recovery in stable condition. Dr. Meraz was present and scrubbed for the entirety of the procedure.     Joanne Membreno MD  OBGYN PGY-2  5:50 PM 2/12/2020    Physician Attestation   I was present for the entire procedure between opening and closing. I have reviewed and edited the above operative report to reflect the exact findings and details of the surgical procedure.    Rajni Meraz MD

## 2020-02-12 NOTE — BRIEF OP NOTE
Brief Operative Note    Name: Thea Rodriguez  MRN: [id number]  : 1985  Date of Surgery: 2020    Pre-operative Diagnosis:  IUP @ 39w4d  History of  section x1  Failed GCT, passed GTT  AMA  Child with T21    Post-operative Diagnosis:  Same    Procedure(s):   Repeat low transverse  section with double layer uterine closure via Pfannenstiel skin incision    Surgeon:  Rajni Meraz MD    Assistants:  Joanne Membreno MD, PGY2  Davie Cornelius, MS3     Anesthesia: Spinal  EBL: 708 ml  UOP: 100 mL clear urine  Fluids: 1000 mL crystalloid  Additional Medications: 2g Ancef preop  Specimens: Cord blood  Complications: None apparent    Findings:   - Viable female infant delivered at 1338 on 2020 with APGARs 9 and 9. Weight pending.    - Clear amniotic fluid, Placenta intact.   - Normal tubes and ovaries. Uterus with multiple small (2 cm ) fibroids across anterior uterine wall. Vascular area with poor tone 4 x4 cm near right cornua  - Scant rectofascial adhesions.   - Surgical sites noted to be hemostatic at the end of case.     Joanne Membreno MD  Obstetrics & Gynecology, PGY-2  2020 2:34 PM

## 2020-02-12 NOTE — ANESTHESIA PROCEDURE NOTES
Peripheral Nerve Block Procedure Note    Staff:     Anesthesiologist:  Thi Luis MD  Location: OR AFTER induction  Procedure Start/Stop TImes:     patient identified, IV checked, site marked, risks and benefits discussed, informed consent, monitors and equipment checked, pre-op evaluation, at physician/surgeon's request and post-op pain management      Correct Patient: Yes      Correct Position: Yes      Correct Site: Yes      Correct Procedure: Yes      Correct Laterality:  N/A    Site Marked:  N/A  Procedure details:     Procedure:  TAP    ASA:  2    Laterality:  Bilateral    Position:  Supine    Needle:  Insulated    Needle gauge:  21    Needle length (inches):  3.1    Ultrasound: Yes      Ultrasound used to identify targeted nerve, plexus, or vascular structure and placed a needle adjacent to it      Permanent Image entered into patiient's record      Abnormal pain on injection: No      Blood Aspirated: No      Paresthesias:  No    Bleeding at site: No    Assessment/Narrative:     Injection made incrementally with aspirations every (mL):  5

## 2020-02-12 NOTE — ANESTHESIA PROCEDURE NOTES
Spinal/LP Procedure Note    Spinal Block  Staff:     Anesthesiologist:  Thi Luis MD    Resident/CRNA:  Genesis Clemens MD  Location: OR  Procedure Start/Stop Times:     patient identified, IV checked, site marked, risks and benefits discussed, informed consent, monitors and equipment checked, pre-op evaluation, at physician/surgeon's request and post-op pain management      Correct Patient: Yes      Correct Position: Yes      Correct Site: Yes      Correct Procedure: Yes      Correct Laterality:  N/A    Site Marked:  N/A  Procedure:     Procedure:  Intrathecal    ASA:  2    Position:  Sitting    Insertion site:  L3-4    Approach:  Midline    Needle Type:  Estuardo    Needle gauge (G):  25    Local Skin Infiltration:  1% lidocaine    amount (ml):  3    Needle Length (in):  3.5    Introducer used: Yes      Introducer gauge:  20 G    Attempts:  2    Redirects:  2    CSF:  Clear    Paresthesias:  No  Assessment/Narrative:     Sensory Level:  T5

## 2020-02-12 NOTE — DISCHARGE SUMMARY
[facility] Discharge Summary    Thea Rodriguez MRN# [id number]   Age: 34 year old YOB: 1985     Date of Admission:  2020  Date of Discharge::  2/15/20  Admitting Physician:  Rajni Meraz MD  Discharge Physician:  Fátima Díaz MD             Admission Diagnoses:   Intrauterine pregnancy at 39w4d  History of  section x1  Failed GCT, passed GTT  AMA  Child with T21          Discharge Diagnosis:     Same, delivered             Procedures:     Procedure(s): Repeat low transverse  section with double layer uterine closure via Pfannenstiel skin incision  Spinal  TAP block                  Medications Prior to Admission:     Medications Prior to Admission   Medication Sig Dispense Refill Last Dose     Prenatal Vit-Fe Fumarate-FA (PRENATAL VITAMIN) 27-0.8 MG TABS Take 1 tablet by mouth daily 90 tablet 3 2020 at Unknown time     Hydroquinone 1.5 % CREA    Not Taking     omeprazole (PRILOSEC) 20 MG DR capsule Take 1 capsule (20 mg) by mouth daily 30 capsule 3 Not Taking     vitamin D3 (CHOLECALCIFEROL) 2000 units (50 mcg) tablet Take 1 tablet (2,000 Units) by mouth daily 100 tablet 3 Taking             Discharge Medications:        Review of your medicines      START taking      Dose / Directions   acetaminophen 500 MG tablet  Commonly known as:  TYLENOL  Used for:  Previous  delivery, antepartum condition or complication      Dose:  500 mg  Take 1 tablet (500 mg) by mouth every 6 hours as needed for mild pain  Quantity:  100 tablet  Refills:  0     ibuprofen 600 MG tablet  Commonly known as:  ADVIL/MOTRIN  Used for:  High-risk pregnancy, elderly multigravida, unspecified trimester      Dose:  600 mg  Take 1 tablet (600 mg) by mouth every 6 hours as needed  Quantity:  40 tablet  Refills:  0     oxyCODONE 5 MG tablet  Commonly known as:  ROXICODONE  Used for:  Previous  delivery, antepartum condition or complication      Dose:  5 mg  Take 1 tablet (5 mg)  by mouth every 6 hours as needed for severe pain  Quantity:  4 tablet  Refills:  0     SENNA-docusate sodium 8.6-50 MG tablet  Commonly known as:  SENNA S  Used for:  High-risk pregnancy, elderly multigravida, unspecified trimester      Dose:  1 tablet  Take 1 tablet by mouth At Bedtime  Quantity:  90 tablet  Refills:  0        CONTINUE these medicines which have NOT CHANGED      Dose / Directions   Hydroquinone 1.5 % Crea  Indication:  for melasma prescribed by Dermatology      Refills:  0     omeprazole 20 MG DR capsule  Commonly known as:  priLOSEC  Used for:  Acute gastritis without hemorrhage, unspecified gastritis type      Dose:  20 mg  Take 1 capsule (20 mg) by mouth daily  Quantity:  30 capsule  Refills:  3     Prenatal Vitamin 27-0.8 MG Tabs  Used for:  At risk for alteration in nutrition      Dose:  1 tablet  Take 1 tablet by mouth daily  Quantity:  90 tablet  Refills:  3     vitamin D3 2000 units (50 mcg) tablet  Commonly known as:  CHOLECALCIFEROL  Used for:  High-risk pregnancy, elderly multigravida, unspecified trimester      Dose:  1 tablet  Take 1 tablet (2,000 Units) by mouth daily  Quantity:  100 tablet  Refills:  3           Where to get your medicines      These medications were sent to [facility] [address]  [address] [address]    Phone:  [phone]     acetaminophen 500 MG tablet    ibuprofen 600 MG tablet    SENNA-docusate sodium 8.6-50 MG tablet     Some of these will need a paper prescription and others can be bought over the counter. Ask your nurse if you have questions.    Bring a paper prescription for each of these medications    oxyCODONE 5 MG tablet                 Consultations:   Anesthesiology  Lactation          Brief Admission History:   Thea Rodriguez is a 34 year old  at 39w4d by LMP c/w 17w5d US who presents today for scheduled repeat  section. Feeling anxious and excited for  today.     Pregnancy notable for:   History of  section x1  Failed GCT, passed GTT  AMA  Child with T21     She reports good fetal movement. Denies LOF, vaginal bleeding, or contractions.  She denies fever, chills, SOB, chest pain, palpitations, N/V, LE swelling/tenderness.  No concerns for headache, vision changes, RUQ or epigastric pain         Intraoperative course   The procedure was uncomplicated.   mL.  See operative report for details.   - Viable female infant delivered at 1338 on 2020 with APGARs 9 and 9. Weight 3660g.    - Clear amniotic fluid, Placenta intact.   - Normal tubes and ovaries. Uterus with multiple small (2 cm ) fibroids across anterior uterine wall. Vascular area with poor tone 4 x4 cm near right cornua  - Scant rectofascial adhesions.   - Surgical sites noted to be hemostatic at the end of case.        Postpartum Course   The patient's hospital course was unremarkable.  She recovered as anticipated and experienced no post-operative complications.  On discharge, her pain was well controlled. Vaginal bleeding is similar to peak menstrual flow.  Voiding without difficulty.  Ambulating well and tolerating a normal diet.  No fever or significant wound drainage.  Breastfeeding well.  Infant is stable.  Regaining bowel function  She was discharged on post-partum day #3.    Post-partum hemoglobin:   Hemoglobin   Date Value Ref Range Status   2020 12.3 11.7 - 15.7 g/dL Final             Discharge Instructions and Follow-Up:     Discharge diet: Regular   Discharge activity: No lifting greater than 20 lbs, pushing, pulling, or other strenuous activity for 6 weeks. Pelvic rest for 6 weeks including no sexual intercourse, tampons, or douching. No driving until you can slam on the breaks without pain or while on narcotic pain medications.    Discharge follow-up: Follow up with primary OB for routine postpartum visit in 6 weeks     Wound care: Keep incision clean and dry            Discharge Disposition:     Discharged to home      Venessa Swanson MD  Ob/Gyn PGY-3  02/15/20 9:17 AM        Physician Attestation   I, Fátima Díaz, saw and evaluated this patient prior to discharge.  I discussed the patient with the resident/fellow and agree with plan of care as documented in the note.      I personally reviewed vital signs, medications and labs.    I personally spent 20 minutes on discharge activities.  Discharge counseling completed with in person Telugu .    Fátima Díaz MD  Date of Service (when I saw the patient): 02/15/20

## 2020-02-12 NOTE — ANESTHESIA PREPROCEDURE EVALUATION
"Anesthesia Pre-Procedure Evaluation    Patient: Thea Rodriguez   MRN:     [id number] Gender:   female   Age:    34 year old :      1985        Preoperative Diagnosis: Previous  delivery, antepartum condition or complication [O34.219]  High-risk pregnancy, elderly multigravida, unspecified trimester [O09.529]   Procedure(s):   SECTION     LABS:  CBC:   Lab Results   Component Value Date    WBC 6.9 02/10/2020    WBC 6.4 2020    HGB 13.5 02/10/2020    HGB 12.9 2020    HCT 41.3 02/10/2020    HCT 38.8 2020     02/10/2020     2020     BMP:   Lab Results   Component Value Date     (H) 2019     COAGS: No results found for: PTT, INR, FIBR  POC:   Lab Results   Component Value Date    HCG Positive (A) 2019     OTHER:   Lab Results   Component Value Date    ALT 19 2020    AST 15 2020    TSH 2.56 2019        Preop Vitals    BP Readings from Last 3 Encounters:   20 116/81   20 121/77   20 101/67    Pulse Readings from Last 3 Encounters:   20 98   20 121   20 96      Resp Readings from Last 3 Encounters:   19 20   19 25    SpO2 Readings from Last 3 Encounters:   20 98%   20 95%   20 98%      Temp Readings from Last 1 Encounters:   20 36.6  C (97.9  F) (Oral)    Ht Readings from Last 1 Encounters:   20 1.575 m (5' 2\")      Wt Readings from Last 1 Encounters:   20 77.7 kg (171 lb 3.2 oz)    Estimated body mass index is 31.31 kg/m  as calculated from the following:    Height as of 20: 1.575 m (5' 2\").    Weight as of 20: 77.7 kg (171 lb 3.2 oz).     LDA:        Past Medical History:   Diagnosis Date     NO ACTIVE PROBLEMS       Past Surgical History:   Procedure Laterality Date      SECTION        No Known Allergies     Anesthesia Evaluation     . Pt has had prior anesthetic. Type: Regional    No history of anesthetic " complications          ROS/MED HX    ENT/Pulmonary:  - neg pulmonary ROS     Neurologic:  - neg neurologic ROS     Cardiovascular:  - neg cardiovascular ROS       METS/Exercise Tolerance:  4 - Raking leaves, gardening   Hematologic:  - neg hematologic  ROS       Musculoskeletal:  - neg musculoskeletal ROS       GI/Hepatic:  - neg GI/hepatic ROS       Renal/Genitourinary:  - ROS Renal section negative       Endo:  - neg endo ROS       Psychiatric:  - neg psychiatric ROS       Infectious Disease:  - neg infectious disease ROS       Malignancy:      - no malignancy   Other:    (+) Possibly pregnant                        PHYSICAL EXAM:   Mental Status/Neuro: A/A/O   Airway: Facies: Feasible  Mallampati: III  Mouth/Opening: Full  TM distance: > 6 cm  Neck ROM: Full   Respiratory: Auscultation: CTAB     Resp. Rate: Normal     Resp. Effort: Normal      CV: Rhythm: Regular  Rate: Age appropriate  Heart: Normal Sounds  Edema: None   Comments:      Dental: Normal Dentition                Assessment:   ASA SCORE: 2    H&P: History and physical reviewed and following examination; no interval change.   Smoking Status:  Non-Smoker/Unknown   NPO Status: NPO Appropriate     Plan:   Anes. Type:  Spinal; MAC; Peripheral Nerve Block     Block Details: Exparel; TAP   Pre-Medication: None   Induction:  IV (Standard)   Airway: Native Airway   Access/Monitoring: PIV   Maintenance: N/a     Postop Plan:   Postop Pain: Regional  Postop Sedation/Airway: Not planned     PONV Management:   Adult Risk Factors: Female, Non-Smoker   Prevention: Ondansetron     CONSENT: Direct conversation   Plan and risks discussed with: Patient                      Thi Luis MD

## 2020-02-12 NOTE — ANESTHESIA CARE TRANSFER NOTE
Patient: Thea Rodriguez    Procedure(s):   SECTION    Diagnosis: Previous  delivery, antepartum condition or complication [O34.219]  High-risk pregnancy, elderly multigravida, unspecified trimester [O09.529]  Diagnosis Additional Information: No value filed.    Anesthesia Type:   Spinal, MAC, Peripheral Nerve Block     Note:  Airway :Face Mask  Patient transferred to:PACU  Handoff Report: Identifed the Patient, Identified the Reponsible Provider, Reviewed the pertinent medical history, Discussed the surgical course, Reviewed Intra-OP anesthesia mangement and issues during anesthesia, Set expectations for post-procedure period and Allowed opportunity for questions and acknowledgement of understanding      Vitals: (Last set prior to Anesthesia Care Transfer)    CRNA VITALS  2020 1423 - 2020 1453      2020             EKG:  Sinus rhythm                Electronically Signed By: Genesis Clemens MD  2020  2:53 PM

## 2020-02-13 ENCOUNTER — APPOINTMENT (OUTPATIENT)
Dept: INTERPRETER SERVICES | Facility: CLINIC | Age: 35
End: 2020-02-13
Payer: COMMERCIAL

## 2020-02-13 LAB — HGB BLD-MCNC: 12.3 G/DL (ref 11.7–15.7)

## 2020-02-13 PROCEDURE — 85018 HEMOGLOBIN: CPT | Performed by: STUDENT IN AN ORGANIZED HEALTH CARE EDUCATION/TRAINING PROGRAM

## 2020-02-13 PROCEDURE — 25000132 ZZH RX MED GY IP 250 OP 250 PS 637: Performed by: STUDENT IN AN ORGANIZED HEALTH CARE EDUCATION/TRAINING PROGRAM

## 2020-02-13 PROCEDURE — 12000001 ZZH R&B MED SURG/OB UMMC

## 2020-02-13 PROCEDURE — 36415 COLL VENOUS BLD VENIPUNCTURE: CPT | Performed by: STUDENT IN AN ORGANIZED HEALTH CARE EDUCATION/TRAINING PROGRAM

## 2020-02-13 PROCEDURE — 25000128 H RX IP 250 OP 636: Performed by: STUDENT IN AN ORGANIZED HEALTH CARE EDUCATION/TRAINING PROGRAM

## 2020-02-13 RX ADMIN — IBUPROFEN 800 MG: 800 TABLET ORAL at 21:46

## 2020-02-13 RX ADMIN — SENNOSIDES AND DOCUSATE SODIUM 2 TABLET: 8.6; 5 TABLET ORAL at 09:23

## 2020-02-13 RX ADMIN — ACETAMINOPHEN 975 MG: 325 TABLET, FILM COATED ORAL at 01:11

## 2020-02-13 RX ADMIN — SENNOSIDES AND DOCUSATE SODIUM 1 TABLET: 8.6; 5 TABLET ORAL at 21:46

## 2020-02-13 RX ADMIN — KETOROLAC TROMETHAMINE 30 MG: 30 INJECTION, SOLUTION INTRAMUSCULAR at 05:37

## 2020-02-13 RX ADMIN — ACETAMINOPHEN 975 MG: 325 TABLET, FILM COATED ORAL at 13:10

## 2020-02-13 RX ADMIN — ACETAMINOPHEN 975 MG: 325 TABLET, FILM COATED ORAL at 21:49

## 2020-02-13 RX ADMIN — SIMETHICONE CHEW TAB 80 MG 80 MG: 80 TABLET ORAL at 09:23

## 2020-02-13 RX ADMIN — KETOROLAC TROMETHAMINE 30 MG: 30 INJECTION, SOLUTION INTRAMUSCULAR at 13:10

## 2020-02-13 NOTE — PROVIDER NOTIFICATION
Patient had emesis, gave Zofran now resolved.  But complaining of headache, eye pain, she said blurry vision.  BP 90s/60s. Tylenol given but normally 975mg/8 hours for s/p C/S. Can this be changed?

## 2020-02-13 NOTE — PLAN OF CARE
Patient's vss. Postpartum assessment WDL. Patient is tolerating ambulating well. Voiding in large amounts. Pain well managed. Bonding well with infant.

## 2020-02-13 NOTE — PROGRESS NOTES
Patient arrived to [facility] unit via zoom cart at 1645,with belongings, accompanied by spouse/ significant other, with infant in arms. Received report from Rosalia and checked bands. Unit and room orientation started. Call light given; no concerns present at this time. Continue with plan of care.

## 2020-02-13 NOTE — PROGRESS NOTES
"Post  Anesthesia Follow Up Note    Patient: Thea Rodriguez    Patient location: Postpartum floor.    Chief complaint: Acute postoperative pain management s/p intrathecal morphine administration     Procedure(s) Performed:  Procedure(s):   SECTION    Anesthesia type: Spinal Block    Subjective:     Pain Control: Good pain control - she is just a little sore    Additional ROS:  She does not complain of pruritis at this time. She denies weakness, denies paresthesia, denies difficulties breathing or voiding, denies nausea or vomiting. She is able to ambulate and tolerates regular diet.    Objective:    Respiratory Function (RR / SpO2 / Airway Patency): Satisfactory    Cardiac Function (HR / Rhythm / BP): Satisfactory    Strength and sensation lower extremities: Normal    Last Vitals: BP 91/58 (BP Location: Left arm)   Pulse 64   Temp 36.9  C (98.5  F) (Oral)   Resp 17   Ht 1.575 m (5' 2\")   Wt 77.6 kg (171 lb)   LMP 2019 (Exact Date)   SpO2 99%   Breastfeeding Unknown   BMI 31.28 kg/m      Assessment and plan:   Thea Rodriguez is a 34 year old female  POD #1 s/p   with IT bupivacaine (1.6ml), fentanyl (10mcg) and morphine (100 mcg); and single shot TAP nerve block injections with 20 mL bupivacaine 0.25% with epinephrine 1:200,000, then 20mL liposome bupivacaine (Exparel) long-acting 1.3% given in the PACU for postoperative analgesia.  Pt is ambulating without difficulty, no weakness or paresthesias.  There is no evidence of adverse side effects associated with spinal and nerve block injections.  The patient is receiving adequate incisional pain control at this time and anticipate up to 72 hours of incisional pain control.  However, we further anticipate that the patient will require opioid/nonopioid analgesics for visceral and muscle pain that is not controlled with local anesthetic.      In brief summary, her post-operative analgesia is good today. Further " interventional analgesic strategies would be of little utility at this time. Thus, we recommend proceeding with PO analgesics including staggered dosing of NSAIDs (ibuprofen) and acetaminophen, with a taper of oxycodone.     Thank you for including us in the care for this patient.    Genesis PARNELL  Anesthesia Resident, PGY2

## 2020-02-13 NOTE — PROGRESS NOTES
"[facility]   Post-partum Note    Name:  Thea Rodriguez  MRN: [id number]    S: Patient is doing well.  Pain is well controlled.  Tolerating regular diet without nausea or vomiting.  Ambulating without dizziness.  Lochia appropriate.  Breast feeding.  She feels as though she is having to strain to empty her bladder though she feels as though she is emptying in fully. Denies headaches, vision changes, chest pain, or shortness of breath.    O:   Patient Vitals for the past 24 hrs:   BP Temp Temp src Pulse Heart Rate Resp SpO2 Height Weight   20 1900 103/62 -- -- 76 76 16 100 % -- --   20 1800 90/68 -- -- 60 60 18 100 % -- --   20 1730 104/62 -- -- 89 89 20 100 % -- --   20 1700 110/74 -- -- 79 79 18 100 % -- --   20 1625 106/66 -- -- 68 -- 18 100 % -- --   20 1610 104/70 -- -- 63 62 18 100 % -- --   20 1555 105/67 -- -- 70 82 18 100 % -- --   20 1540 102/58 -- -- 62 62 19 100 % -- --   20 1525 96/68 -- -- 79 70 21 100 % -- --   20 1515 101/68 -- -- -- 67 14 99 % -- --   20 1500 111/67 97.6  F (36.4  C) Oral -- 87 11 99 % -- --   20 0910 121/64 98.3  F (36.8  C) Oral -- -- 16 -- 1.575 m (5' 2\") 77.6 kg (171 lb)     Gen:  Resting comfortably, NAD  CV:  Regular rate  Pulm:  Unlabored breathing  Abd:  Soft, appropriately ttp, non-distended. Fundus at umbilicus, firm and non-tender.  Incision: c/d/i with dressing in place  Ext:  non-tender, Trace LE edema b/l    I/O last 3 completed shifts:  In: 900 [I.V.:900]  Out: 863 [Urine:155; Blood:708]    Hgb:   Hemoglobin   Date Value Ref Range Status   02/10/2020 13.5 11.7 - 15.7 g/dL Final       Assessment/Plan:  34 year old  on POD #1 s/p RLTCS.  Pregnancy complicated by failed GCT, passed GTT.  Doing well post-operatively.     -Routine postpartum management  Pain: Well-controlled with ibuprofen and tylenol, prn oxycodone  Hgb: 13.5>QBL 708ml> am hgb  GI:  Scheduled bowel " regimen ordered.   :  S/p guy. Voiding. Will obtain a PVR to be sure she is emptying her bladder    Rh: pos  Rubella: imm  Feed: br  BC: Undecided, will discuss    Dispo: Anticipate discharge home POD#2    Joanne Membreno MD  Obstetrics & Gynecology, PGY-2  2/13/2020 6:54 AM      Physician Attestation   I, Rajni Meraz MD, personally examined and evaluated this patient.  I discussed the patient with the resident/fellow and care team, and agree with the assessment and plan of care as documented in the note of 2/12/20.      I personally reviewed vital signs, medications and labs.  Hemoglobin   Date Value Ref Range Status   02/13/2020 12.3 11.7 - 15.7 g/dL Final   02/10/2020 13.5 11.7 - 15.7 g/dL Final     Key findings: patient is doing well for POD#1.  She will try voiding and do post void residual later today.   Continue routine cares.  Maybe ready for discharge tomorrow afternoon.   Rajni Meraz MD  Date of Service (when I saw the patient): 02/13/20

## 2020-02-13 NOTE — PLAN OF CARE
Patient vital signs stable. Postpartum assessment within normal limits. Pain managed with tylenol and ibuprofen. Patient eating and drinking normally. Landa removed @0100. Due to void. Ambulated to bathroom, denies dizziness. Patient breastfeeding with assistance. Pumped x1 overnight. Continue with plan of care

## 2020-02-14 ENCOUNTER — OFFICE VISIT (OUTPATIENT)
Dept: INTERPRETER SERVICES | Facility: CLINIC | Age: 35
End: 2020-02-14
Payer: COMMERCIAL

## 2020-02-14 PROCEDURE — 25000132 ZZH RX MED GY IP 250 OP 250 PS 637: Performed by: STUDENT IN AN ORGANIZED HEALTH CARE EDUCATION/TRAINING PROGRAM

## 2020-02-14 PROCEDURE — 12000001 ZZH R&B MED SURG/OB UMMC

## 2020-02-14 RX ORDER — AMOXICILLIN 250 MG
2 CAPSULE ORAL 2 TIMES DAILY
Status: DISCONTINUED | OUTPATIENT
Start: 2020-02-14 | End: 2020-02-15 | Stop reason: HOSPADM

## 2020-02-14 RX ORDER — POLYETHYLENE GLYCOL 3350 17 G/17G
17 POWDER, FOR SOLUTION ORAL DAILY
Status: DISCONTINUED | OUTPATIENT
Start: 2020-02-14 | End: 2020-02-15 | Stop reason: HOSPADM

## 2020-02-14 RX ORDER — SENNA AND DOCUSATE SODIUM 50; 8.6 MG/1; MG/1
1 TABLET, FILM COATED ORAL AT BEDTIME
Qty: 90 TABLET | Refills: 0 | Status: SHIPPED | OUTPATIENT
Start: 2020-02-14 | End: 2021-03-03

## 2020-02-14 RX ORDER — IBUPROFEN 600 MG/1
600 TABLET, FILM COATED ORAL EVERY 6 HOURS PRN
Qty: 40 TABLET | Refills: 0 | Status: SHIPPED | OUTPATIENT
Start: 2020-02-14 | End: 2021-10-06

## 2020-02-14 RX ADMIN — IBUPROFEN 800 MG: 800 TABLET ORAL at 23:23

## 2020-02-14 RX ADMIN — IBUPROFEN 800 MG: 800 TABLET ORAL at 03:28

## 2020-02-14 RX ADMIN — IBUPROFEN 800 MG: 800 TABLET ORAL at 09:49

## 2020-02-14 RX ADMIN — ACETAMINOPHEN 975 MG: 325 TABLET, FILM COATED ORAL at 14:25

## 2020-02-14 RX ADMIN — SENNOSIDES AND DOCUSATE SODIUM 2 TABLET: 8.6; 5 TABLET ORAL at 23:23

## 2020-02-14 RX ADMIN — ACETAMINOPHEN 975 MG: 325 TABLET, FILM COATED ORAL at 23:25

## 2020-02-14 RX ADMIN — POLYETHYLENE GLYCOL 3350 17 G: 17 POWDER, FOR SOLUTION ORAL at 09:48

## 2020-02-14 RX ADMIN — SENNOSIDES AND DOCUSATE SODIUM 2 TABLET: 8.6; 5 TABLET ORAL at 03:57

## 2020-02-14 RX ADMIN — IBUPROFEN 800 MG: 800 TABLET ORAL at 16:19

## 2020-02-14 RX ADMIN — ACETAMINOPHEN 975 MG: 325 TABLET, FILM COATED ORAL at 06:42

## 2020-02-14 NOTE — PLAN OF CARE
"Pt up in room & hallway ambulating independently. Pt c/o incisional pain, taking ibuprofen & tylenol. Declines oxycodone. Using hot packs for cramping during breastfeeding. Pt breastfeeding independently, good latch observed. Using lanolin for tender nipples, no breakdown/bruising observed. Pt states she had a BM this morning, but she had to \"strain alittle.\" Miralax given & will continue with stool softeners BID.   "

## 2020-02-14 NOTE — PLAN OF CARE
VSS. Fundus firm, midline at U/2. Incision open to air with steri strips - no signs of infection noted. Up ad ashlee, tolerating regular diet and voiding adequately. Incision and cramping pain controlled with tylenol and ibuprofen, pt declined oxycodone. IV removed. Breastfeeding infant independently and supplementing with donor milk. Sister at bedside and attentive to pt. Patient would like to stay another day and does not feel ready for discharge. Bonding well with infant. Continue current plan of care.

## 2020-02-14 NOTE — PLAN OF CARE
VSS and postpartum assessments WDL.  Up ad ashlee with steady gait.  Independent with cares.  Bonding well with infant.  Breastfeeding on cue and also supplementing with donor milk. Pain managed with tylenol and ibuprofen.  Partner present and supportive.  Will continue with postpartum cares and education per plan of care.

## 2020-02-14 NOTE — LACTATION NOTE
"This note was copied from a baby's chart.  Consult placed for mom breastfeeding her second baby, breastfeeding well but asking for donor milk supplements & giving 10-20 mL frequently by bottle, rare pumping.      delivery @ term, AGA infant with 5.7% weight loss at 24 and 8.2% loss at 48 hours of age, low risk serum bilirubin and diaper output beyond expected for age. Mom had normal QBL, using ibuprofen and tylenol for pain (no oxycodone yet). She is AMA @ 33 y/o, history of melasma, elevated GCT but GTT WNL.     Mom reports breastfeeding going well but starting to get sore. Encouraged to call RN at time for feedings to help work on deeper latch. Discussed supply and demand, risks of supplements to milk supply and recommend pumping each time baby gets a bottle to stimulate her supply to catch up to what baby is taking in. Mom concerned she has \"no milk,\" has tried pumping but nothing much coming out. Explained benefits of pumping even if no milk out in order to stimulate supply, expect to see results tomorrow & next day. Offer and mom accept demo of hand expressing. Demo massage first, then large drop colostrum out with first compression using hands. Demo technique then had mom take over using bedside mirror to see results, she was able to elicit about 2 mL. Demo and family return demo how to spoon feed results.    Reviewed [facility] pump parts cleaning recommendations & handout, breastfeeding log with how to tell if getting enough and breastfeeding resource list encourage follow up with lactation outpatient using one of the LC's on the list. Encouraged continued hand expressing after each feeding until milk is in & even could do between feeds to help catch up, feed back results. Also encouraged pumping if she does want to continue with supplements, each time baby gets bottle. Gave support and encouragement, answered questions.   "

## 2020-02-14 NOTE — PROGRESS NOTES
[facility]   Post-partum Note    Name:  Thea Rodriguez  MRN: [id number]    S: Patient is doing well.  Pain is well controlled.  Tolerating regular diet without nausea or vomiting.  Ambulating without dizziness.  Lochia appropriate.  Breast feeding and bottle feeding.  Voiding.  Passing flatus, no BM. Denies headaches, vision changes, chest pain, or shortness of breath.    O:   Patient Vitals for the past 24 hrs:   BP Temp Temp src Pulse Heart Rate Resp SpO2   20 0050 91/59 97.9  F (36.6  C) Oral 78 -- 16 --   20 1605 (!) 88/63 98.1  F (36.7  C) Oral -- 84 16 --   20 1319 109/66 97.9  F (36.6  C) Oral -- 99 16 --   20 0922 110/68 98.6  F (37  C) Oral -- 101 16 --   20 0759 91/58 98.5  F (36.9  C) Oral -- 97 17 99 %     Gen:  Resting comfortably, NAD  CV:  Regular rate  Pulm:  Unlabored breathing  Abd:  Soft, appropriately ttp, moderately distended. Fundus at umbilicus, firm and non-tender.  Incision: c/d/i with steristrips in place  Ext:  non-tender, Trace LE edema b/l    I/O last 3 completed shifts:  In: -   Out: 2150 [Urine:2150]    Hgb:   Hemoglobin   Date Value Ref Range Status   2020 12.3 11.7 - 15.7 g/dL Final       Assessment/Plan:  34 year old  on POD #2 s/p RLTCS.  Pregnancy complicated by failed GCT, passed GTT.  Doing well post-operatively.     -Routine postpartum management  Pain: Well-controlled with ibuprofen and tylenol, prn oxycodone  Hgb: 13.5>QBL 708ml> 12.3  GI:  Scheduled bowel regimen ordered. Tom senna and miralax given distension  :  S/p guy. Voiding.  Rh: pos  Rubella: imm  Feed: br  BC: Undecided, will discuss    Dispo: Anticipate discharge home POD#3    Amy Schumer, MD  Obstetrics and Gynecology, PGY-3  20 6:38 AM      Physician Attestation   I, Flavia Marcano MD, personally examined and evaluated this patient.  I discussed the patient with the resident/fellow and care team, and agree with the  assessment and plan of care as documented in the note of 20.      I personally reviewed vital signs, medications, labs and exam.    Key findings: 34 year old  on POD 2 s/p RLTCS. Working on breastfeeding and does not feel ready to discharge today. Plan discharge to home tomorrow morning.   Due to language barrier, an ipad  was present during the history-taking and subsequent discussion (and for part of the physical exam) with this patient.    Flavia Marcano MD  Date of Service (when I saw the patient): 20

## 2020-02-15 VITALS
SYSTOLIC BLOOD PRESSURE: 109 MMHG | HEIGHT: 62 IN | OXYGEN SATURATION: 99 % | DIASTOLIC BLOOD PRESSURE: 63 MMHG | TEMPERATURE: 99.2 F | HEART RATE: 78 BPM | RESPIRATION RATE: 20 BRPM | BODY MASS INDEX: 31.47 KG/M2 | WEIGHT: 171 LBS

## 2020-02-15 PROCEDURE — 25000132 ZZH RX MED GY IP 250 OP 250 PS 637: Performed by: STUDENT IN AN ORGANIZED HEALTH CARE EDUCATION/TRAINING PROGRAM

## 2020-02-15 RX ORDER — ACETAMINOPHEN 500 MG
500 TABLET ORAL EVERY 6 HOURS PRN
Qty: 100 TABLET | Refills: 0 | Status: SHIPPED | OUTPATIENT
Start: 2020-02-15 | End: 2021-10-06

## 2020-02-15 RX ORDER — OXYCODONE HYDROCHLORIDE 5 MG/1
5 TABLET ORAL EVERY 6 HOURS PRN
Qty: 4 TABLET | Refills: 0 | Status: SHIPPED | OUTPATIENT
Start: 2020-02-15 | End: 2021-03-03

## 2020-02-15 RX ADMIN — IBUPROFEN 800 MG: 800 TABLET ORAL at 05:47

## 2020-02-15 NOTE — PLAN OF CARE
"VSS and postpartum assessments WDL.  Up ad ashlee with steady gait.  Independent with cares.  Bonding well with baby girl.  Breastfeeding on cue, but baby was very fussy and cluster feeding a lot tonight. After hand expressing a small puddle and feeding that to baby, and then latching baby on for a 15-20 minute feed, baby was still showing cues of hunger - parents requested donor milk. Education was provided about the importance of offering baby the breast during cluster feeding, but parents requested donor milk be provided instead. Mother stated that she had \"no milk\" despite our success with hand expressing a spoonful from both breasts. She was extensively educated on the amount of milk expected for a mother to be producing on day 2. Mother verbalized frustration with having \"no milk\" and asked that donor milk be provided, instead. Will continue to provide support and support parents' decision to supplement.  Pain managed with tylenol and ibuprofen.   is present and supportive.  Will continue with postpartum cares and education per plan of care.   "

## 2020-02-15 NOTE — PLAN OF CARE
Data: Vital signs stable, postpartum assessments within normal limits.   Eating and drinking without nausea or vomiting.  Up ad ashlee, and voiding without difficulty. Passing gas/BM.  Feeding baby independently-    Pain managed with medication. Pt states she is comfortable.  Perineum/Incision appears to be healing well, no s/s infection.  Discharge outcomes on care plan met.   Action: Review of care plan, teaching, and discharge instructions done. Infant identification with ID bands done, verification with signature obtained.   Response: Patient states understanding and comfort with her discharge instructions and plan of care. All questions addressed. She will discharge home with her infant. 02/15/[id number]

## 2020-02-15 NOTE — PLAN OF CARE
Vitals & PP assessments are stable. Up ad ashlee & voiding. Passing flatus. Breast feeding well & needs minimal . Requested donor breast milk.     Continue on supportive cares.

## 2020-02-15 NOTE — PROGRESS NOTES
[facility]   Post-partum Note    Name:  Thea Rodriguez  MRN: [id number]    S: Patient states she is doing well.  Pain is well controlled.  Tolerating regular diet without nausea or vomiting.  Ambulating without dizziness.  Lochia appropriate.  Breast feeding and bottle feeding.  Voiding.  Passing flatus, no BM. Denies headaches, vision changes, chest pain, or shortness of breath, RUQ pain, increased edema.    O:   Patient Vitals for the past 24 hrs:   BP Temp Temp src Pulse Heart Rate Resp   02/15/20 0831 109/63 99.2  F (37.3  C) Oral 78 -- 20   02/15/20 0010 (!) 118/91 97.9  F (36.6  C) Oral 67 -- 20   20 1645 98/71 98.3  F (36.8  C) Oral -- 71 17     Gen:  Resting comfortably, NAD  CV:  Regular rate  Pulm:  Unlabored breathing  Abd:  Soft, appropriately ttp, moderately distended. Fundus below umbilicus, firm and non-tender.  Incision: c/d/i with steristrips in place  Ext:  non-tender, Trace LE edema b/l    Hgb:   Hemoglobin   Date Value Ref Range Status   2020 12.3 11.7 - 15.7 g/dL Final       Assessment/Plan:  34 year old  on POD #3 s/p RLTCS.  Pregnancy complicated by failed GCT, passed GTT.  Doing well post-operatively.      -Routine postpartum management  Pain:     Well-controlled with ibuprofen and tylenol, prn oxycodone  Hgb:      13.5>QBL 708ml> 12.3  GI:         Scheduled bowel regimen ordered. Tom senna and miralax given distension  :       S/p guy. Voiding.  Rh:        pos  Rubella: imm  Feed:    Br/btl, lactation following  BC:       Undecided.  Patient would like to think about it and decide at her postpartum visit.    Dispo:   Anticipate discharge home POD#3    Carlos Stringer MD  Ob/Gyn Resident, PGY-1  02/15/20 9:19 AM      Physician Attestation   I, Fátima Díaz MD, personally examined and evaluated this patient.  I discussed the patient with the resident/fellow and care team, and agree with the assessment and plan of care as documented in the  note of 2/15/2020.      I personally reviewed vital signs, medications and labs.    Malloy findings: Thea Rodriguez is a 34 year old  POD#3 s/p repeat low transverse  section via pfannenstiel skin incision, currently feeling well and meeting goals for discharge to home.  Reviewed pain control with scheduled tylenol, ibuprofen, and PRN oxycodone upon discharge.  Patient has not required oxycodone while inpatient, but following discussion will send home with 4 tablets in case with increased activity they are needed.  Mild range blood pressure overnight is likely a spurious value given all her other normal blood pressures. She is tolerating regular diet, voiding without issues, ambulating without dizziness, and feels ready for discharge to home today.   Fátima Díaz MD  Date of Service (when I saw the patient): 02/15/20  Interview, counseling, and examination performed with in person Danish .

## 2020-02-19 ENCOUNTER — DOCUMENTATION ONLY (OUTPATIENT)
Dept: OBGYN | Facility: CLINIC | Age: 35
End: 2020-02-19

## 2020-02-19 NOTE — PROGRESS NOTES
[facility] will be sharing updates with you on Maternal Child Health Referral requests for home care services.  This is for care coordination purposes and alert you to referral status.  We received the referral for  Thea Rodriguez; MRN [id number] and want to update you:    [facility] has made two attempts to contact patient by phone and text message ..   We have not had any response from patient.  Final message was left advising patient to follow up with Primary Care Providers for mom and baby.  Ordering MD and Primary Care Providers for mom and baby notified.       Sincerely [facility]  Martina Varela  [phone]

## 2020-07-14 DIAGNOSIS — Z91.89 AT RISK FOR ALTERATION IN NUTRITION: ICD-10-CM

## 2020-07-14 RX ORDER — PNV NO.95/FERROUS FUM/FOLIC AC 28MG-0.8MG
1 TABLET ORAL DAILY
Qty: 90 TABLET | Refills: 3 | Status: SHIPPED | OUTPATIENT
Start: 2020-07-14 | End: 2021-03-03

## 2020-07-14 NOTE — TELEPHONE ENCOUNTER
Routing refill request to provider for review/approval because:  Drug not on the [facility] refill protocol     Heidi Kc RN, BSN, PHN  [facility]: [place]

## 2020-08-03 ENCOUNTER — MEDICAL CORRESPONDENCE (OUTPATIENT)
Dept: HEALTH INFORMATION MANAGEMENT | Facility: CLINIC | Age: 35
End: 2020-08-03

## 2020-09-28 ENCOUNTER — OFFICE VISIT (OUTPATIENT)
Dept: FAMILY MEDICINE | Facility: CLINIC | Age: 35
End: 2020-09-28
Payer: MEDICAID

## 2020-09-28 VITALS
TEMPERATURE: 98.3 F | HEART RATE: 97 BPM | SYSTOLIC BLOOD PRESSURE: 106 MMHG | BODY MASS INDEX: 28.35 KG/M2 | DIASTOLIC BLOOD PRESSURE: 68 MMHG | WEIGHT: 155 LBS | OXYGEN SATURATION: 98 %

## 2020-09-28 DIAGNOSIS — R30.0 DYSURIA: Primary | ICD-10-CM

## 2020-09-28 DIAGNOSIS — R82.90 NONSPECIFIC FINDING ON EXAMINATION OF URINE: ICD-10-CM

## 2020-09-28 DIAGNOSIS — N30.01 ACUTE CYSTITIS WITH HEMATURIA: ICD-10-CM

## 2020-09-28 LAB
ALBUMIN UR-MCNC: NEGATIVE MG/DL
APPEARANCE UR: ABNORMAL
BACTERIA #/AREA URNS HPF: ABNORMAL /HPF
BILIRUB UR QL STRIP: NEGATIVE
COLOR UR AUTO: YELLOW
GLUCOSE UR STRIP-MCNC: NEGATIVE MG/DL
HGB UR QL STRIP: ABNORMAL
KETONES UR STRIP-MCNC: NEGATIVE MG/DL
LEUKOCYTE ESTERASE UR QL STRIP: ABNORMAL
NITRATE UR QL: NEGATIVE
NON-SQ EPI CELLS #/AREA URNS LPF: ABNORMAL /LPF
PH UR STRIP: 5.5 PH (ref 5–7)
RBC #/AREA URNS AUTO: ABNORMAL /HPF
SOURCE: ABNORMAL
SP GR UR STRIP: 1.02 (ref 1–1.03)
UROBILINOGEN UR STRIP-ACNC: 0.2 EU/DL (ref 0.2–1)
WBC #/AREA URNS AUTO: ABNORMAL /HPF

## 2020-09-28 PROCEDURE — 87088 URINE BACTERIA CULTURE: CPT | Performed by: FAMILY MEDICINE

## 2020-09-28 PROCEDURE — 99213 OFFICE O/P EST LOW 20 MIN: CPT | Performed by: FAMILY MEDICINE

## 2020-09-28 PROCEDURE — 81001 URINALYSIS AUTO W/SCOPE: CPT | Performed by: FAMILY MEDICINE

## 2020-09-28 PROCEDURE — 87186 SC STD MICRODIL/AGAR DIL: CPT | Performed by: FAMILY MEDICINE

## 2020-09-28 PROCEDURE — 87086 URINE CULTURE/COLONY COUNT: CPT | Performed by: FAMILY MEDICINE

## 2020-09-28 PROCEDURE — T1013 SIGN LANG/ORAL INTERPRETER: HCPCS | Mod: GT | Performed by: FAMILY MEDICINE

## 2020-09-28 RX ORDER — CIPROFLOXACIN 500 MG/1
500 TABLET, FILM COATED ORAL 2 TIMES DAILY
Qty: 10 TABLET | Refills: 0 | Status: SHIPPED | OUTPATIENT
Start: 2020-09-28 | End: 2021-03-03

## 2020-09-28 NOTE — PROGRESS NOTES
Subjective     Thea Rodriguez is a 35 year old female who presents to clinic today for the following health issues:    HPI     Genitourinary - Female  Onset/Duration: 12 hours  Description:   Painful urination (Dysuria): YES           Frequency: YES  Blood in urine (Hematuria): YES  Delay in urine (Hesitency): no  Intensity: moderate  Progression of Symptoms:  worsening  Accompanying Signs & Symptoms:  Fever/chills: no  Flank pain: YES  Nausea and vomiting: no  Vaginal symptoms: none  Abdominal/Pelvic Pain: YES  History:   History of frequent UTI s: no  History of kidney stones: no  Sexually Active: YES  Possibility of pregnancy: No  Precipitating or alleviating factors: None  Therapies tried and outcome: OTC advil or tylenol   Not pregnancy  LMP: 2 weeks  Dysuria, with blood and frequency  No nausea or vomiting    Review of Systems   Constitutional, HEENT, cardiovascular, pulmonary, gi and gu systems are negative, except as otherwise noted.      Objective    There were no vitals taken for this visit.  There is no height or weight on file to calculate BMI.  Physical Exam   GENERAL: healthy, alert and no distress  RESP: lungs clear to auscultation - no rales, rhonchi or wheezes  CV: regular rate and rhythm,  no murmur, click or rub, no peripheral edema  ABDOMEN: soft, nontender, no masses and bowel sounds normal    Results for orders placed or performed in visit on 09/28/20   *UA reflex to Microscopic and Culture ([place] and [facility] (except [place] and [place])     Status: Abnormal    Specimen: Midstream Urine   Result Value Ref Range    Color Urine Yellow     Appearance Urine Slightly Cloudy     Glucose Urine Negative NEG^Negative mg/dL    Bilirubin Urine Negative NEG^Negative    Ketones Urine Negative NEG^Negative mg/dL    Specific Gravity Urine 1.025 1.003 - 1.035    Blood Urine Large (A) NEG^Negative    pH Urine 5.5 5.0 - 7.0 pH    Protein Albumin Urine Negative NEG^Negative mg/dL    Urobilinogen  Urine 0.2 0.2 - 1.0 EU/dL    Nitrite Urine Negative NEG^Negative    Leukocyte Esterase Urine Large (A) NEG^Negative    Source Midstream Urine    Urine Microscopic     Status: Abnormal   Result Value Ref Range    WBC Urine  (A) OTO5^0 - 5 /HPF    RBC Urine 25-50 (A) OTO2^O - 2 /HPF    Squamous Epithelial /LPF Urine Few FEW^Few /LPF    Bacteria Urine Few (A) NEG^Negative /HPF   Urine Culture Aerobic Bacterial     Status: None (Preliminary result)    Specimen: Unspecified Urine   Result Value Ref Range    Specimen Description Unspecified Urine     Special Requests Specimen received in preservative     Culture Micro PENDING      Assessment & Plan     Thea was seen today for dysuria.    Diagnoses and all orders for this visit:    Dysuria   UA consistent with UTI. Abx treatment  -     *UA reflex to Microscopic and Culture ([place] and [facility] (except [place] and [place])  -     Urine Microscopic  -     ciprofloxacin (CIPRO) 500 MG tablet; Take 1 tablet (500 mg) by mouth 2 times daily    Nonspecific finding on examination of urine  -     Urine Culture Aerobic Bacterial    Acute cystitis with hematuria  -     ciprofloxacin (CIPRO) 500 MG tablet; Take 1 tablet (500 mg) by mouth 2 times daily         Return in about 1 week (around 10/5/2020) for Follow up for symptoms recheck.    Harish Lai MD  [facility]

## 2020-09-28 NOTE — LETTER
October 2, 2020      Thea Rodriguez  [address]    [place] [address]        Dear ,    We are writing to inform you of your test results.    Your urinary tract infection should be treated. Call or return to clinic as needed if these symptoms worsen or fail to improve as anticipated        Resulted Orders   *UA reflex to Microscopic and Culture ([place] and [facility] (except [place] and [place])   Result Value Ref Range    Color Urine Yellow     Appearance Urine Slightly Cloudy     Glucose Urine Negative NEG^Negative mg/dL    Bilirubin Urine Negative NEG^Negative    Ketones Urine Negative NEG^Negative mg/dL    Specific Gravity Urine 1.025 1.003 - 1.035    Blood Urine Large (A) NEG^Negative    pH Urine 5.5 5.0 - 7.0 pH    Protein Albumin Urine Negative NEG^Negative mg/dL    Urobilinogen Urine 0.2 0.2 - 1.0 EU/dL    Nitrite Urine Negative NEG^Negative    Leukocyte Esterase Urine Large (A) NEG^Negative    Source Midstream Urine    Urine Culture Aerobic Bacterial   Result Value Ref Range    Specimen Description Unspecified Urine     Special Requests Specimen received in preservative     Culture Micro >100,000 colonies/mL  Escherichia coli   (A)    Urine Microscopic   Result Value Ref Range    WBC Urine  (A) OTO5^0 - 5 /HPF    RBC Urine 25-50 (A) OTO2^O - 2 /HPF    Squamous Epithelial /LPF Urine Few FEW^Few /LPF    Bacteria Urine Few (A) NEG^Negative /HPF       If you have any questions or concerns, please call the clinic at the number listed above.       Sincerely,        Harish Lai MD/finn

## 2020-09-28 NOTE — PATIENT INSTRUCTIONS
"  Patient Education     Bladder Infection, Female (Adult)    Urine is normally doesn't have any bacteria in it. But bacteria can get into the urinary tract from the skin around the rectum. Or they can travel in the blood from elsewhere in the body. Once they are in your urinary tract, they can cause infection in the urethra (urethritis), the bladder (cystitis), or the kidneys (pyelonephritis).  The most common place for an infection is in the bladder. This is called a bladder infection. This is one of the most common infections in women. Most bladder infections are easily treated. They are not serious unless the infection spreads to the kidney.  The phrases \"bladder infection,\" \"UTI,\" and \"cystitis\" are often used to describe the same thing. But they are not always the same. Cystitis is an inflammation of the bladder. The most common cause of cystitis is an infection.  Symptoms  The infection causes inflammation in the urethra and bladder. This causes many of the symptoms. The most common symptoms of a bladder infection are:    Pain or burning when urinating    Having to urinate more often than usual    Urgent need to urinate    Only a small amount of urine comes out    Blood in urine    Abdominal discomfort. This is usually in the lower abdomen above the pubic bone.    Cloudy urine    Strong- or bad-smelling urine    Unable to urinate (urinary retention)    Unable to hold urine in (urinary incontinence)    Fever    Loss of appetite    Confusion (in older adults)  Causes  Bladder infections are not contagious. You can't get one from someone else, from a toilet seat, or from sharing a bath.  The most common cause of bladder infections is bacteria from the bowels. The bacteria get onto the skin around the opening of the urethra. From there, they can get into the urine and travel up to the bladder, causing inflammation and infection. This usually happens because of:    Wiping improperly after urinating. Always wipe " from front to back.    Bowel incontinence    Pregnancy    Procedures such as having a catheter inserted    Older age    Not emptying your bladder. This can allow bacteria a chance to grow in your urine.    Dehydration    Constipation    Sex    Use of a diaphragm for birth control   Treatment  Bladder infections are diagnosed by a urine test. They are treated with antibiotics and usually clear up quickly without complications. Treatment helps prevent a more serious kidney infection.  Medicines  Medicines can help in the treatment of a bladder infection:    Take antibiotics until they are used up, even if you feel better. It is important to finish them to make sure the infection has cleared.    You can use acetaminophen or ibuprofen for pain, fever, or discomfort, unless another medicine was prescribed. If you have chronic liver or kidney disease, talk with your healthcare provider before using these medicines. Also talk with your provider if you've ever had a stomach ulcer or gastrointestinal bleeding, or are taking blood-thinner medicines.    If you are given phenazopydridine to reduce burning with urination, it will cause your urine to become a bright orange color. This can stain clothing.  Care and prevention  These self-care steps can help prevent future infections:    Drink plenty of fluids to prevent dehydration and flush out your bladder. Do this unless you must restrict fluids for other health reasons, or your doctor told you not to.    Proper cleaning after going to the bathroom is important. Wipe from front to back after using the toilet to prevent the spread of bacteria.    Urinate more often. Don't try to hold urine in for a long time.    Wear loose-fitting clothes and cotton underwear. Avoid tight-fitting pants.    Improve your diet and prevent constipation. Eat more fresh fruit and vegetables, and fiber, and less junk and fatty foods.    Avoid sex until your symptoms are gone.    Avoid caffeine,  alcohol, and spicy foods. These can irritate your bladder.    Urinate right after intercourse to flush out your bladder.    If you use birth control pills and have frequent bladder infections, discuss it with your doctor.  Follow-up care  Call your healthcare provider if all symptoms are not gone after 3 days of treatment. This is especially important if you have repeat infections.  If a culture was done, you will be told if your treatment needs to be changed. If directed, you can call to find out the results.  If X-rays were done, you will be told if the results will affect your treatment.  Call 911  Call 911 if any of the following occur:    Trouble breathing    Hard to wake up or confusion    Fainting or loss of consciousness    Rapid heart rate  When to seek medical advice  Call your healthcare provider right away if any of these occur:    Fever of 100.4 F (38.0 C) or higher, or as directed by your healthcare provider    Symptoms are not better by the third day of treatment    Back or belly (abdominal) pain that gets worse    Repeated vomiting, or unable to keep medicine down    Weakness or dizziness    Vaginal discharge    Pain, redness, or swelling in the outer vaginal area (labia)  Date Last Reviewed: 10/1/2016    6711-2209 [employer]. [address] [place] [address], [place] [address]. All rights reserved. This information is not intended as a substitute for professional medical care. Always follow your healthcare professional's instructions.

## 2020-10-02 LAB
BACTERIA SPEC CULT: ABNORMAL
Lab: ABNORMAL
SPECIMEN SOURCE: ABNORMAL

## 2020-10-02 NOTE — RESULT ENCOUNTER NOTE
Mail letter:    Your urinary tract infection should be treated. Call or return to clinic as needed if these symptoms worsen or fail to improve as anticipated.     Taina Delgado MD

## 2021-03-03 ENCOUNTER — OFFICE VISIT (OUTPATIENT)
Dept: FAMILY MEDICINE | Facility: CLINIC | Age: 36
End: 2021-03-03
Payer: COMMERCIAL

## 2021-03-03 VITALS
HEART RATE: 85 BPM | OXYGEN SATURATION: 100 % | HEIGHT: 62 IN | BODY MASS INDEX: 27.75 KG/M2 | WEIGHT: 150.8 LBS | SYSTOLIC BLOOD PRESSURE: 112 MMHG | TEMPERATURE: 97.9 F | DIASTOLIC BLOOD PRESSURE: 70 MMHG | RESPIRATION RATE: 20 BRPM

## 2021-03-03 DIAGNOSIS — Z00.00 ROUTINE GENERAL MEDICAL EXAMINATION AT A HEALTH CARE FACILITY: Primary | ICD-10-CM

## 2021-03-03 DIAGNOSIS — L65.9 HAIR LOSS: ICD-10-CM

## 2021-03-03 DIAGNOSIS — R53.83 OTHER FATIGUE: ICD-10-CM

## 2021-03-03 DIAGNOSIS — Z13.1 SCREENING FOR DIABETES MELLITUS: ICD-10-CM

## 2021-03-03 DIAGNOSIS — Z12.4 SCREENING FOR MALIGNANT NEOPLASM OF CERVIX: ICD-10-CM

## 2021-03-03 DIAGNOSIS — E78.5 HYPERLIPIDEMIA LDL GOAL <160: ICD-10-CM

## 2021-03-03 PROBLEM — O09.529 HIGH-RISK PREGNANCY, ELDERLY MULTIGRAVIDA, UNSPECIFIED TRIMESTER: Status: RESOLVED | Noted: 2019-12-03 | Resolved: 2021-03-03

## 2021-03-03 PROBLEM — O09.299 DOWN SYNDROME IN CHILD OF PRIOR PREGNANCY, CURRENTLY PREGNANT: Status: RESOLVED | Noted: 2019-09-10 | Resolved: 2021-03-03

## 2021-03-03 LAB
HBA1C MFR BLD: 5.7 % (ref 0–5.6)
HGB BLD-MCNC: 13.5 G/DL (ref 11.7–15.7)

## 2021-03-03 PROCEDURE — 85018 HEMOGLOBIN: CPT | Performed by: NURSE PRACTITIONER

## 2021-03-03 PROCEDURE — 84443 ASSAY THYROID STIM HORMONE: CPT | Performed by: NURSE PRACTITIONER

## 2021-03-03 PROCEDURE — 99395 PREV VISIT EST AGE 18-39: CPT | Performed by: NURSE PRACTITIONER

## 2021-03-03 PROCEDURE — 83036 HEMOGLOBIN GLYCOSYLATED A1C: CPT | Performed by: NURSE PRACTITIONER

## 2021-03-03 PROCEDURE — G0145 SCR C/V CYTO,THINLAYER,RESCR: HCPCS | Performed by: NURSE PRACTITIONER

## 2021-03-03 PROCEDURE — 82306 VITAMIN D 25 HYDROXY: CPT | Performed by: NURSE PRACTITIONER

## 2021-03-03 PROCEDURE — 82947 ASSAY GLUCOSE BLOOD QUANT: CPT | Performed by: NURSE PRACTITIONER

## 2021-03-03 PROCEDURE — 80061 LIPID PANEL: CPT | Performed by: NURSE PRACTITIONER

## 2021-03-03 PROCEDURE — 36415 COLL VENOUS BLD VENIPUNCTURE: CPT | Performed by: NURSE PRACTITIONER

## 2021-03-03 PROCEDURE — 87624 HPV HI-RISK TYP POOLED RSLT: CPT | Performed by: NURSE PRACTITIONER

## 2021-03-03 RX ORDER — MULTIVIT,CALC,MINS/IRON/FOLIC 500-18-0.4
1 TABLET ORAL DAILY
Qty: 90 TABLET | Refills: 3 | Status: SHIPPED | OUTPATIENT
Start: 2021-03-03 | End: 2021-10-06

## 2021-03-03 ASSESSMENT — MIFFLIN-ST. JEOR: SCORE: 1322.27

## 2021-03-03 NOTE — LETTER
"March 5, 2021      Thea Rodriguez  [facility]    [facility] with [facility] [address]        Dear Thea,     Your Vitamin D level is slightly below the ideal range of >30.  I recommend that you take an over the counter Vitamin D 2,000 units daily.     Your hemoglobin A1C is slightly elevated in the prediabetes range.  I recommend working on improving nutrition and getting more physical activity.     Your cholesterol levels are also high- I will include recommendations below.     If you have any questions or concerns please feel free to contact me at the office at [phone] or via [employer].     Vi Castaneda, KATHERINE, APRN, CNP       Your cholesterol is abnormal, please use the recommendations below and recheck labs in 6-12 months.       Ways to improve your cholesterol       1- Eats less saturated fats (including avoiding \"trans\" fats).       2 - Eat more unsaturated fats  - found in vegetables, grains, and tree nuts.                        Also by replacing butter with canola oil or olive oil.       3 - Eat more nuts.                        1-2 ounces (a small handful) of almonds, walnuts, hazelnuts or pecans once a day in place of other less healthy snacks.       4 - Eat more high fiber foods - vegetables and whole grains including oat bran, oats, beans, peas, and flax seed.       5 - Eat more fish - such as salmon, tuna, mackerel, and sardines.  1 or 2 six ounce servings per week is a healthy replacement for other proteins.       6 - Exercise for at least 150 minutes per week - which is equal to 30 minutes 5 days per week.         Resulted Orders   Vitamin D Deficiency   Result Value Ref Range    Vitamin D Deficiency screening 26 20 - 75 ug/L      Comment:      Season, race, dietary intake, and treatment affect the concentration of   25-hydroxy-Vitamin D. Values may decrease during winter months and increase   during summer months. Values 20-29 ug/L may indicate Vitamin D insufficiency   and values <20 ug/L may " indicate Vitamin D deficiency.  Vitamin D determination is routinely performed by an immunoassay specific for   25 hydroxyvitamin D3.  If an individual is on vitamin D2 (ergocalciferol)   supplementation, please specify 25 OH vitamin D2 and D3 level determination by   LCMSMS test VITD23.     Hemoglobin   Result Value Ref Range    Hemoglobin 13.5 11.7 - 15.7 g/dL   TSH with free T4 reflex   Result Value Ref Range    TSH 2.76 0.40 - 4.00 mU/L   Glucose   Result Value Ref Range    Glucose 93 70 - 99 mg/dL      Comment:      Fasting specimen   Hemoglobin A1c   Result Value Ref Range    Hemoglobin A1C 5.7 (H) 0 - 5.6 %      Comment:      Normal <5.7% Prediabetes 5.7-6.4%  Diabetes 6.5% or higher - adopted from ADA   consensus guidelines.     Lipid panel reflex to direct LDL Fasting   Result Value Ref Range    Cholesterol 223 (H) <200 mg/dL      Comment:      Desirable:       <200 mg/dl    Triglycerides 99 <150 mg/dL      Comment:      Fasting specimen    HDL Cholesterol 36 (L) >49 mg/dL    LDL Cholesterol Calculated 167 (H) <100 mg/dL      Comment:      Above desirable:  100-129 mg/dl  Borderline High:  130-159 mg/dL  High:             160-189 mg/dL  Very high:       >189 mg/dl      Non HDL Cholesterol 187 (H) <130 mg/dL      Comment:      Above Desirable:  130-159 mg/dl  Borderline high:  160-189 mg/dl  High:             190-219 mg/dl  Very high:       >219 mg/dl

## 2021-03-03 NOTE — PROGRESS NOTES
SUBJECTIVE:   CC: Thea Rodriguez is an 35 year old woman who presents for preventive health visit.     Patient has been advised of split billing requirements and indicates understanding: Yes     Healthy Habits:    Do you get at least three servings of calcium containing foods daily (dairy, green leafy vegetables, etc.)? yes    Amount of exercise or daily activities, outside of work: no    Problems taking medications regularly No    Medication side effects: No    Have you had an eye exam in the past two years? no    Do you see a dentist twice per year? yes    Do you have sleep apnea, excessive snoring or daytime drowsiness? Yes    Fatigue and hair loss for 6 months.    LMP 2/10/21  She is using family-planning for contraception and tells me she is not interested in any other birth control methods today    2 children    Today's PHQ-2 Score:   PHQ-2 ( 1999 [employer]) 3/3/2021 4/26/2019   Q1: Little interest or pleasure in doing things 0 0   Q2: Feeling down, depressed or hopeless 0 0   PHQ-2 Score 0 0       Abuse: Current or Past(Physical, Sexual or Emotional)- NO  Do you feel safe in your environment? YES    Have you ever done Advance Care Planning? (For example, a Health Directive, POLST, or a discussion with a medical provider or your loved ones about your wishes): No, advance care planning information given to patient to review.  Patient plans to discuss their wishes with loved ones or provider.      Social History     Tobacco Use     Smoking status: Never Smoker     Smokeless tobacco: Never Used   Substance Use Topics     Alcohol use: No     Frequency: Never     If you drink alcohol do you typically have >3 drinks per day or >7 drinks per week? No                     Reviewed orders with patient.  Reviewed health maintenance and updated orders accordingly - Yes  BP Readings from Last 3 Encounters:   03/03/21 112/70   09/28/20 106/68   02/15/20 109/63    Wt Readings from Last 3 Encounters:   03/03/21 68.4 kg  (150 lb 12.8 oz)   20 70.3 kg (155 lb)   20 77.6 kg (171 lb)                  Patient Active Problem List   Diagnosis     Melasma     Family history of diabetes mellitus in mother     Hyperlipidemia LDL goal <160     Impaired fasting glucose     Status post  section     Past Surgical History:   Procedure Laterality Date      SECTION        SECTION N/A 2020    Procedure:  SECTION;  Surgeon: Rajni Meraz MD;  Location:  [facility]       Social History     Tobacco Use     Smoking status: Never Smoker     Smokeless tobacco: Never Used   Substance Use Topics     Alcohol use: No     Frequency: Never     Family History   Problem Relation Age of Onset     Diabetes Mother 50     No Known Problems Father      No Known Problems Brother      No Known Problems Sister      No Known Problems Brother      No Known Problems Brother      No Known Problems Brother      No Known Problems Sister      Breast Cancer No family hx of      Heart Disease No family hx of          Current Outpatient Medications   Medication Sig Dispense Refill     acetaminophen (TYLENOL) 500 MG tablet Take 1 tablet (500 mg) by mouth every 6 hours as needed for mild pain 100 tablet 0     Hydroquinone 1.5 % CREA        ibuprofen (ADVIL/MOTRIN) 600 MG tablet Take 1 tablet (600 mg) by mouth every 6 hours as needed 40 tablet 0            omeprazole (PRILOSEC) 20 MG DR capsule Take 1 capsule (20 mg) by mouth daily 30 capsule 3     Prenatal Vit-Fe Fumarate-FA (PRENATAL VITAMIN) 27-0.8 MG TABS Take 1 tablet by mouth daily 90 tablet 3     No Known Allergies    Breast CA Risk Screening:  No flowsheet data found.    Pertinent mammograms are reviewed under the imaging tab.    Pertinent mammograms are reviewed under the imaging tab.  History of abnormal Pap smear: NO - age 30-65 PAP every 5 years with negative HPV co-testing recommended     Reviewed and updated as needed this visit by clinical staff  Tobacco  Allergies   "Meds  Problems  Med Hx  Surg Hx  Fam Hx          Reviewed and updated as needed this visit by Provider  Tobacco  Allergies  Meds  Problems  Med Hx  Surg Hx  Fam Hx           ROS:  CONSTITUTIONAL: Positive for fatigue  INTEGUMENTARY/SKIN: POSITIVE for hair loss  EYES: NEGATIVE for vision changes or irritation  ENT: NEGATIVE for ear, mouth and throat problems  RESP: NEGATIVE for significant cough or SOB  BREAST: NEGATIVE for masses, tenderness or discharge  CV: NEGATIVE for chest pain, palpitations or peripheral edema  GI: NEGATIVE for nausea, abdominal pain, heartburn, or change in bowel habits  : NEGATIVE for unusual urinary or vaginal symptoms. Periods are regular.  MUSCULOSKELETAL: NEGATIVE for significant arthralgias or myalgia  NEURO: NEGATIVE for weakness, dizziness or paresthesias  PSYCHIATRIC: NEGATIVE for changes in mood or affect    OBJECTIVE:   /70 (BP Location: Right arm)   Pulse 85   Temp 97.9  F (36.6  C) (Oral)   Resp 20   Ht 1.575 m (5' 2\")   Wt 68.4 kg (150 lb 12.8 oz)   SpO2 100%   BMI 27.58 kg/m    EXAM:  GENERAL: healthy, alert, no distress and over weight  EYES: Eyes grossly normal to inspection, PERRL and conjunctivae and sclerae normal  HENT: ear canals and TM's normal, nose and mouth without ulcers or lesions  NECK: no adenopathy, no asymmetry, masses, or scars and thyroid normal to palpation  RESP: lungs clear to auscultation - no rales, rhonchi or wheezes  BREAST: normal without masses, tenderness or nipple discharge and no palpable axillary masses or adenopathy  CV: regular rate and rhythm, normal S1 S2, no S3 or S4, no murmur, click or rub, no peripheral edema and peripheral pulses strong  ABDOMEN: soft, nontender, no hepatosplenomegaly, no masses and bowel sounds normal   (female): normal female external genitalia, normal urethral meatus, vaginal mucosa pink, moist, well rugated, and normal cervix/adnexa/uterus without masses or discharge  MS: no gross " "musculoskeletal defects noted, no edema  SKIN: no suspicious lesions or rashes  NEURO: Normal strength and tone, mentation intact and speech normal  PSYCH: mentation appears normal, affect normal/bright      ASSESSMENT/PLAN:   1. Routine general medical examination at a health care facility    - Multiple Vitamins-Calcium (ONE-A-DAY WOMENS FORMULA) TABS; Take 1 tablet by mouth daily  Dispense: 90 tablet; Refill: 3    2. Other fatigue    - Vitamin D Deficiency  - Hemoglobin  - TSH with free T4 reflex    3. Hair loss    - Vitamin D Deficiency  - Hemoglobin  - TSH with free T4 reflex    4. Screening for diabetes mellitus    - Glucose  - Hemoglobin A1c    5. Screening for malignant neoplasm of cervix    - Pap imaged thin layer screen with HPV - recommended age 30 - 65 years (select HPV order below)  - HPV High Risk Types DNA Cervical    6. Hyperlipidemia LDL goal <160    - Lipid panel reflex to direct LDL Fasting    Patient has been advised of split billing requirements and indicates understanding: Yes  COUNSELING:   Reviewed preventive health counseling, as reflected in patient instructions       Regular exercise       Healthy diet/nutrition    Estimated body mass index is 27.58 kg/m  as calculated from the following:    Height as of this encounter: 1.575 m (5' 2\").    Weight as of this encounter: 68.4 kg (150 lb 12.8 oz).    Weight management plan: Discussed healthy diet and exercise guidelines    She reports that she has never smoked. She has never used smokeless tobacco.      Counseling Resources:  ATP IV Guidelines  Pooled Cohorts Equation Calculator  Breast Cancer Risk Calculator  BRCA-Related Cancer Risk Assessment: FHS-7 Tool  FRAX Risk Assessment  ICSI Preventive Guidelines  Dietary Guidelines for Americans, 2010  USDA's MyPlate  ASA Prophylaxis  Lung CA Screening    Vi Castaneda NP  [facility]  "

## 2021-03-03 NOTE — PATIENT INSTRUCTIONS
Preventive Health Recommendations  Female Ages 26 - 39  Yearly exam:   See your health care provider every year in order to    Review health changes.     Discuss preventive care.      Review your medicines if you your doctor has prescribed any.    Until age 30: Get a Pap test every three years (more often if you have had an abnormal result).    After age 30: Talk to your doctor about whether you should have a Pap test every 3 years or have a Pap test with HPV screening every 5 years.   You do not need a Pap test if your uterus was removed (hysterectomy) and you have not had cancer.  You should be tested each year for STDs (sexually transmitted diseases), if you're at risk.   Talk to your provider about how often to have your cholesterol checked.  If you are at risk for diabetes, you should have a diabetes test (fasting glucose).  Shots: Get a flu shot each year. Get a tetanus shot every 10 years.   Nutrition:     Eat at least 5 servings of fruits and vegetables each day.    Eat whole-grain bread, whole-wheat pasta and brown rice instead of white grains and rice.    Get adequate Calcium and Vitamin D.     Lifestyle    Exercise at least 150 minutes a week (30 minutes a day, 5 days of the week). This will help you control your weight and prevent disease.    Limit alcohol to one drink per day.    No smoking.     Wear sunscreen to prevent skin cancer.    See your dentist every six months for an exam and cleaning.  [facility]     Discharged by : Sussy Dacosta CMA    If you have any questions regarding your visit please contact your care team:     Team Adriana              Clinic Hours Telephone Number     Dr. Jimmy Castaneda, ISAURA   7am-7pm  Monday - Thursday   7am-5pm  Fridays  [phone]   (Appointment scheduling available 24/7)     RN Line  [phone] option 2     Urgent Care - [facility] - 11am-9pm  Monday-Friday Saturday-Sunday- 9am-5pm     [place] -   5pm-9pm Monday-Friday Saturday-Sunday- 9am-5pm    [phone] - [facility]    [phone] - [place]     For a Price Quote for your services, please call our Consumer Price Line at [phone].     What options do I have for visits at the clinic other than the traditional office visit?     To expand how we care for you, many of our providers are utilizing electronic visits (e-visits) and telephone visits, when medically appropriate, for interactions with their patients rather than a visit in the clinic. We also offer nurse visits for many medical concerns. Just like any other service, we will bill your insurance company for this type of visit based on time spent on the phone with your provider. Not all insurance companies cover these visits. Please check with your medical insurance if this type of visit is covered. You will be responsible for any charges that are not paid by your insurance.     E-visits via [employer]: generally incur a $45.00 fee.     Telephone visits:  Time spent on the phone: *charged based on time that is spent on the phone in increments of 10 minutes. Estimated cost:   5-10 mins $30.00   11-20 mins. $59.00   21-30 mins. $85.00       Use [employer] (secure email communication and access to your chart) to send your primary care provider a message or make an appointment. Ask someone on your Team how to sign up for [employer].     As always, Thank you for trusting us with your health care needs!      [facility]:    Scheduling Appointments  [facility]  Call: [phone]    [facility]  Call: [phone]    [facility]  Call: [phone]    For Gastroenterology referrals   [facility]   Clinics and Surgery [place], 4th Floor   [address] [place] [id number]   Appointments: [phone]    WHERE TO GO FOR  CARE?    Clinic    Make an appointment if you:       Are sick (cold, cough, flu, sore throat, earache or in pain).       Have a small injury (sprain, small cut, burn or broken bone).       Need a physical exam, Pap smear, vaccine or prescription refill.       Have questions about your health or medicines.    To reach us:      Call [phone] ([phone]). Open 24 hours every day. (For counseling services, call [phone].)    Log into [employer]) at [employer]. (Visit [employer] to create an account.) Hospital emergency room    An emergency is a serious or life- threatening problem that must be treated right away.    Call 532 or get to the hospital if you have:      Very bad or sudden:            - Chest pain or pressure         - Bleeding         - Head or belly pain         - Dizziness or trouble seeing, walking or                          Speaking      Problems breathing      Blood in your vomit or you are coughing up blood      A major injury (knocked out, loss of a finger or limb, rape, broken bone protruding from skin)    A mental health crisis. (Or call the Mental Health Crisis line at 1-128.208.2585 or Suicide Prevention Hotline at 1-531.868.7749.)    Open 24 hours every day. You don't need an appointment.     Urgent care    Visit urgent care for sickness or small injuries when the clinic is closed. You don't need an appointment. To check hours or find an urgent care near you, visit [email or web address]. Online care    Get online care from [facility] for more than 70 common problems, like colds, allergies and infections. Open 24 hours every day at:   [email or web address]   Need help deciding?    For advice about where to be seen, you may call your clinic and ask to speak with a nurse. We're here for you 24 hours every day.         If you are deaf or hard of hearing, please let us know. We provide many free services including sign language interpreters, oral interpreters, TTYs, telephone  amplifiers, note takers and written materials.

## 2021-03-04 LAB
CHOLEST SERPL-MCNC: 223 MG/DL
DEPRECATED CALCIDIOL+CALCIFEROL SERPL-MC: 26 UG/L (ref 20–75)
GLUCOSE SERPL-MCNC: 93 MG/DL (ref 70–99)
HDLC SERPL-MCNC: 36 MG/DL
LDLC SERPL CALC-MCNC: 167 MG/DL
NONHDLC SERPL-MCNC: 187 MG/DL
TRIGL SERPL-MCNC: 99 MG/DL
TSH SERPL DL<=0.005 MIU/L-ACNC: 2.76 MU/L (ref 0.4–4)

## 2021-03-06 LAB
COPATH REPORT: NORMAL
PAP: NORMAL

## 2021-03-08 LAB
FINAL DIAGNOSIS: NORMAL
HPV HR 12 DNA CVX QL NAA+PROBE: NEGATIVE
HPV16 DNA SPEC QL NAA+PROBE: NEGATIVE
HPV18 DNA SPEC QL NAA+PROBE: NEGATIVE
SPECIMEN DESCRIPTION: NORMAL
SPECIMEN SOURCE CVX/VAG CYTO: NORMAL

## 2021-03-23 ENCOUNTER — VIRTUAL VISIT (OUTPATIENT)
Dept: FAMILY MEDICINE | Facility: CLINIC | Age: 36
End: 2021-03-23
Payer: COMMERCIAL

## 2021-03-23 DIAGNOSIS — Z71.84 COUNSELING ABOUT TRAVEL: Primary | ICD-10-CM

## 2021-03-23 PROCEDURE — 99207 PR NO BILLABLE SERVICE THIS VISIT: CPT | Mod: 95 | Performed by: NURSE PRACTITIONER

## 2021-03-23 NOTE — LETTER
March 23, 2021      Thea GILMORE Michael  [address]    [place] [address]      To Whom It May Concern,     Thea is under my care and will be traveling for a family emergency to [place] in the next couple weeks.  For this reason I recommend that Thea gets the Covid immunization.    Sincerely,      Vi Castaneda, DNP, APRN, CNP

## 2021-03-23 NOTE — PROGRESS NOTES
Thea is a 36 year old who is being evaluated via a billable telephone visit.      What phone number would you like to be contacted at? [phone],   FV  [phone]  How would you like to obtain your AVS? My-Chart     Assessment & Plan     Counseling about travel    Discussed that current stage of Covid vaccinations through the [facility] system is vaccinating those at high risk and older age.  Patient does not meet criteria for Covid vaccination at this time through our system.   Patient requested a letter of support for the Covid vaccination and I did provide this today- she can certainly reach out to her local pharmacy or [facility] to pursue other access for vaccination.                 Return in about 1 year (around 3/3/2022) for Physical Exam.    Vi Castaneda NP  [facility]    Reddy Sidhu is a 36 year old who presents for the following health issues     HPI     Needs to travel to [place] in 20 days and needs a Covid 19 vaccine before travel   Patient wants order for vaccine so she can get both before travel   She says that she has heard that if there is a family emergency that someone can get the Covid vaccine sooner.  She is having no other issues today.      Review of Systems   Constitutional, HEENT, cardiovascular, pulmonary, gi and gu systems are negative, except as otherwise noted.      Objective           Vitals:  No vitals were obtained today due to virtual visit.    Physical Exam   healthy, alert and no distress  PSYCH: Alert and oriented times 3; coherent speech, normal   rate and volume, able to articulate logical thoughts, able   to abstract reason, no tangential thoughts, no hallucinations   or delusions  Her affect is normal  RESP: No cough, no audible wheezing, able to talk in full sentences  Remainder of exam unable to be completed due to telephone visits              Phone call duration: 10 minutes      Telephone visit (rather than a office visit) done today due to COVID-19 pandemic.

## 2021-03-24 ENCOUNTER — IMMUNIZATION (OUTPATIENT)
Dept: NURSING | Facility: CLINIC | Age: 36
End: 2021-03-24
Payer: COMMERCIAL

## 2021-03-24 PROCEDURE — 91300 PR COVID VAC PFIZER DIL RECON 30 MCG/0.3 ML IM: CPT

## 2021-03-24 PROCEDURE — 0001A PR COVID VAC PFIZER DIL RECON 30 MCG/0.3 ML IM: CPT

## 2021-04-14 ENCOUNTER — IMMUNIZATION (OUTPATIENT)
Dept: NURSING | Facility: CLINIC | Age: 36
End: 2021-04-14
Attending: INTERNAL MEDICINE
Payer: COMMERCIAL

## 2021-04-14 PROCEDURE — 91300 PR COVID VAC PFIZER DIL RECON 30 MCG/0.3 ML IM: CPT

## 2021-04-14 PROCEDURE — 0002A PR COVID VAC PFIZER DIL RECON 30 MCG/0.3 ML IM: CPT

## 2021-04-25 ENCOUNTER — HEALTH MAINTENANCE LETTER (OUTPATIENT)
Age: 36
End: 2021-04-25

## 2021-10-06 ENCOUNTER — LAB (OUTPATIENT)
Dept: LAB | Facility: CLINIC | Age: 36
End: 2021-10-06

## 2021-10-06 ENCOUNTER — PRENATAL OFFICE VISIT (OUTPATIENT)
Dept: NURSING | Facility: CLINIC | Age: 36
End: 2021-10-06
Payer: COMMERCIAL

## 2021-10-06 ENCOUNTER — TELEPHONE (OUTPATIENT)
Dept: OBGYN | Facility: CLINIC | Age: 36
End: 2021-10-06

## 2021-10-06 VITALS — BODY MASS INDEX: 27.58 KG/M2 | HEIGHT: 62 IN

## 2021-10-06 DIAGNOSIS — O09.529 SUPERVISION OF HIGH-RISK PREGNANCY OF ELDERLY MULTIGRAVIDA: Primary | ICD-10-CM

## 2021-10-06 DIAGNOSIS — O09.529 ENCOUNTER FOR SUPERVISION OF MULTIGRAVIDA WITH ADVANCED MATERNAL AGE: Primary | ICD-10-CM

## 2021-10-06 DIAGNOSIS — O09.529 ENCOUNTER FOR SUPERVISION OF MULTIGRAVIDA WITH ADVANCED MATERNAL AGE: ICD-10-CM

## 2021-10-06 PROBLEM — O28.5 ABNORMAL CHROMOSOMAL AND GENETIC FINDING ON ANTENATAL SCREENING MOTHER: Status: ACTIVE | Noted: 2018-05-10

## 2021-10-06 LAB
ABO/RH(D): NORMAL
ALBUMIN UR-MCNC: NEGATIVE MG/DL
ANTIBODY SCREEN: NEGATIVE
APPEARANCE UR: CLEAR
BILIRUB UR QL STRIP: NEGATIVE
COLOR UR AUTO: YELLOW
ERYTHROCYTE [DISTWIDTH] IN BLOOD BY AUTOMATED COUNT: 14.9 % (ref 10–15)
GLUCOSE 1H P 50 G GLC PO SERPL-MCNC: 120 MG/DL (ref 70–129)
GLUCOSE UR STRIP-MCNC: NEGATIVE MG/DL
HBA1C MFR BLD: 5.6 % (ref 0–5.6)
HCT VFR BLD AUTO: 36.6 % (ref 35–47)
HGB BLD-MCNC: 12.1 G/DL (ref 11.7–15.7)
HGB UR QL STRIP: NEGATIVE
KETONES UR STRIP-MCNC: NEGATIVE MG/DL
LEUKOCYTE ESTERASE UR QL STRIP: NEGATIVE
MCH RBC QN AUTO: 28.2 PG (ref 26.5–33)
MCHC RBC AUTO-ENTMCNC: 33.1 G/DL (ref 31.5–36.5)
MCV RBC AUTO: 85 FL (ref 78–100)
NITRATE UR QL: NEGATIVE
PH UR STRIP: 6.5 [PH] (ref 5–7)
PLATELET # BLD AUTO: 197 10E3/UL (ref 150–450)
RBC # BLD AUTO: 4.29 10E6/UL (ref 3.8–5.2)
SP GR UR STRIP: <=1.005 (ref 1–1.03)
SPECIMEN EXPIRATION DATE: NORMAL
UROBILINOGEN UR STRIP-ACNC: 0.2 E.U./DL
WBC # BLD AUTO: 7.7 10E3/UL (ref 4–11)

## 2021-10-06 PROCEDURE — 86803 HEPATITIS C AB TEST: CPT

## 2021-10-06 PROCEDURE — 86900 BLOOD TYPING SEROLOGIC ABO: CPT

## 2021-10-06 PROCEDURE — 83036 HEMOGLOBIN GLYCOSYLATED A1C: CPT

## 2021-10-06 PROCEDURE — 99207 PR NO CHARGE NURSE ONLY: CPT

## 2021-10-06 PROCEDURE — 82306 VITAMIN D 25 HYDROXY: CPT

## 2021-10-06 PROCEDURE — 86780 TREPONEMA PALLIDUM: CPT

## 2021-10-06 PROCEDURE — 87340 HEPATITIS B SURFACE AG IA: CPT

## 2021-10-06 PROCEDURE — 87389 HIV-1 AG W/HIV-1&-2 AB AG IA: CPT

## 2021-10-06 PROCEDURE — 86762 RUBELLA ANTIBODY: CPT

## 2021-10-06 PROCEDURE — 86850 RBC ANTIBODY SCREEN: CPT

## 2021-10-06 PROCEDURE — 82950 GLUCOSE TEST: CPT

## 2021-10-06 PROCEDURE — 36415 COLL VENOUS BLD VENIPUNCTURE: CPT

## 2021-10-06 PROCEDURE — 87086 URINE CULTURE/COLONY COUNT: CPT

## 2021-10-06 PROCEDURE — 86901 BLOOD TYPING SEROLOGIC RH(D): CPT

## 2021-10-06 PROCEDURE — 81003 URINALYSIS AUTO W/O SCOPE: CPT

## 2021-10-06 PROCEDURE — 85027 COMPLETE CBC AUTOMATED: CPT

## 2021-10-06 RX ORDER — PRENATAL VIT/IRON FUM/FOLIC AC 27MG-0.8MG
TABLET ORAL
COMMUNITY
Start: 2021-06-29 | End: 2021-10-06

## 2021-10-06 RX ORDER — PRENATAL VIT/IRON FUM/FOLIC AC 27MG-0.8MG
1 TABLET ORAL DAILY
Qty: 90 TABLET | Refills: 3 | Status: SHIPPED | OUTPATIENT
Start: 2021-10-06 | End: 2023-03-14

## 2021-10-06 NOTE — PROGRESS NOTES
Important Information for Provider:     New ob nurse intake by phone, fourth pregnancy. History of C sections. Patient just arrived back from [place] 1 weeks ago. She started her prenatal care there.   Patient arrived at clinic today, thought her nurse intake was in person. She will do her 1 hour GCT today with NOB labs. Handouts and book given.  NOB with Dr Meraz 10/14/2021   Patient stated she had a ultrasound performed in [place] about 2 months ago, could not remember the date or gestational age.      Prenatal OB Questionnaire  Patient supplied answers from flow sheet for:  Prenatal OB Questionnaire.  Past Medical History  Have you ever recieved care for your mental health? : No  Have you ever been in a major accident or suffered serious trauma?: No  Within the last year, has anyone hit, slapped, kicked or otherwise hurt you?: No  In the last year, has anyone forced you to have sex when you didn't want to?: No    Past Medical History 2   Have you ever received a blood transfusion?: (!) Yes  Would you accept a blood transfusion if was medically recommended?: Yes  Does anyone in your home smoke?: No   Is your blood type Rh negative?: No  Have you ever ?: (!) Yes  Have you been hospitalized for a nonsurgical reason excluding normal delivery?: No  Have you ever had an abnormal pap smear?: No    Past Medical History (Continued)  Do you have a history of abnormalities of the uterus?: No  Did your mother take MORALES or any other hormones when she was pregnant with you?: No  Do you have any other problems we have not asked about which you feel may be important to this pregnancy?: No                     Allergies as of 10/6/2021:    Allergies as of 10/06/2021     (No Known Allergies)       Current medications are:  Current Outpatient Medications   Medication Sig Dispense Refill     omeprazole (PRILOSEC) 20 MG DR capsule Take 1 capsule (20 mg) by mouth daily (Patient not taking: Reported on 10/6/2021) 30 capsule 3          Early ultrasound screening tool:    Does patient have irregular periods?  No  Did patient use hormonal birth control in the three months prior to positive urine pregnancy test? No  Is the patient breastfeeding?  No  Is the patient 10 weeks or greater at time of education visit?  No

## 2021-10-07 LAB
BACTERIA UR CULT: NO GROWTH
DEPRECATED CALCIDIOL+CALCIFEROL SERPL-MC: 26 UG/L (ref 20–75)
HBV SURFACE AG SERPL QL IA: NONREACTIVE
HCV AB SERPL QL IA: NONREACTIVE
HIV 1+2 AB+HIV1 P24 AG SERPL QL IA: NONREACTIVE
RUBV IGG SERPL QL IA: 2.89 INDEX
RUBV IGG SERPL QL IA: POSITIVE
T PALLIDUM AB SER QL: NONREACTIVE

## 2021-10-10 ENCOUNTER — HEALTH MAINTENANCE LETTER (OUTPATIENT)
Age: 36
End: 2021-10-10

## 2021-10-14 ENCOUNTER — PRENATAL OFFICE VISIT (OUTPATIENT)
Dept: OBGYN | Facility: CLINIC | Age: 36
End: 2021-10-14
Payer: COMMERCIAL

## 2021-10-14 VITALS
BODY MASS INDEX: 28.74 KG/M2 | SYSTOLIC BLOOD PRESSURE: 105 MMHG | HEART RATE: 97 BPM | DIASTOLIC BLOOD PRESSURE: 68 MMHG | HEIGHT: 62 IN | WEIGHT: 156.2 LBS

## 2021-10-14 DIAGNOSIS — O34.219 PREVIOUS CESAREAN DELIVERY, ANTEPARTUM CONDITION OR COMPLICATION: ICD-10-CM

## 2021-10-14 DIAGNOSIS — O09.529 HIGH-RISK PREGNANCY, ELDERLY MULTIGRAVIDA, UNSPECIFIED TRIMESTER: Primary | ICD-10-CM

## 2021-10-14 PROCEDURE — 99207 PR FIRST OB VISIT: CPT | Performed by: OBSTETRICS & GYNECOLOGY

## 2021-10-14 RX ORDER — CHOLECALCIFEROL (VITAMIN D3) 50 MCG
1 TABLET ORAL DAILY
Qty: 100 TABLET | Refills: 10 | Status: SHIPPED | OUTPATIENT
Start: 2021-10-14 | End: 2023-03-14

## 2021-10-14 ASSESSMENT — MIFFLIN-ST. JEOR: SCORE: 1341.77

## 2021-10-14 NOTE — PROGRESS NOTES
32w0d   SUBJECTIVE:   Thea Rodriguez is a  36 year old female  at 32w0d by LMP who presents for a new Ob visit.  She is here from [facility].  [place] 2 [place] ago. She has been there since 2021. Wants to establish prenatal care.  Feeling well.  No complications with pregnancy.   She did have prenatal care there.  Had an US she says was normal. Reports a normal 1 hour glc test. No records of any care there.   Plans to give birth here in [place].    H/o 2 prior  sections.  First child reported to have Down's syndrome.     OB History    Para Term  AB Living   4 2 2 0 1 2   SAB TAB Ectopic Multiple Live Births   1 0 0 0 2      # Outcome Date GA Lbr Kranthi/2nd Weight Sex Delivery Anes PTL Lv   4 Current            3 Term 20 39w4d  3.66 kg (8 lb 1.1 oz) F CS-LTranv Spinal N PACO      Name: FIORELLAFEMALE-THEA      Apgar1: 9  Apgar5: 9   2 Term    3.402 kg (7 lb 8 oz)  CS-Unspec   PACO      Name: Kidus   1 SAB               Obstetric Comments   Son with Down's syndrome        Patient Active Problem List   Diagnosis     Melasma     Family history of diabetes mellitus in mother     Hyperlipidemia LDL goal <160     Impaired fasting glucose     Status post  section     Abnormal chromosomal and genetic finding on  screening mother       Past Medical History:   Diagnosis Date     Down syndrome in child of prior pregnancy, currently pregnant 9/10/2019       Past Surgical History:   Procedure Laterality Date      SECTION        SECTION N/A 2020    Procedure:  SECTION;  Surgeon: Rajni Meraz MD;  Location: [facility]        Current Outpatient Medications   Medication     omeprazole (PRILOSEC) 20 MG DR capsule     Prenatal Vit-Fe Fumarate-FA (PRENATAL MULTIVITAMIN W/IRON) 27-0.8 MG tablet     No current facility-administered medications for this visit.       No Known Allergies      EXAM:  /68 (BP Location: Right arm, Patient  "Position: Sitting, Cuff Size: Adult Regular)   Pulse 97   Ht 1.575 m (5' 2\")   Wt 70.9 kg (156 lb 3.2 oz)   LMP 2021   BMI 28.57 kg/m    GENERAL: WDWN F in NAD  HEENT: no abnormalities  NECK: without thyromegaly or adenopathy  CHEST: clear to auscultation  CV: RRR without murmur  ABDOMEN: S, NT, no palpable masses or hepatosplenomegaly  MUSCULOSKELETAL: no obvious abnormalities.  NEUROLOGICAL: normal strength, sensation, mental status  PSYCH: normal affect, appropriate  PELVIC: deferred.     ASSESSMENT/ PLAN:  IUP @ 32w0d by LMP   Estimated Date of Delivery: Dec 9, 2021   Late transfer of care, with previous care overseas and no records available  2 prior  sections     Encounter Diagnoses   Name Primary?     High-risk pregnancy, elderly multigravida, unspecified trimester Yes     Previous  delivery, antepartum condition or complication       Reviewed ob labs.    GCT today.   MFM US ordered.     Discussed routine prenatal care and route of delivery.  Given 2 prior  sections, recommend   Patient was counselled on healthy lifestyle habits, normal expectations for prenatal care, and all questions answered.    Return to Clinic in 2 weeks or sooner if problems arise.    Rajni Meraz MD       "

## 2021-10-20 ENCOUNTER — TELEPHONE (OUTPATIENT)
Dept: OBGYN | Facility: CLINIC | Age: 36
End: 2021-10-20

## 2021-10-20 NOTE — TELEPHONE ENCOUNTER
Patient and  calling to schedule an US. Patient was seen on 10/14, recently came back to the [place] from [place]. I did not see a mention of a follow-up US in visit notes and no order was placed. Did you just want her to return for another clinic appointment in 2 weeks or were you wanting an US as well? Giovanna Busch, RN

## 2021-10-26 ENCOUNTER — TRANSCRIBE ORDERS (OUTPATIENT)
Dept: MATERNAL FETAL MEDICINE | Facility: CLINIC | Age: 36
End: 2021-10-26

## 2021-10-26 DIAGNOSIS — O26.90 PREGNANCY RELATED CONDITION, ANTEPARTUM: Primary | ICD-10-CM

## 2021-10-28 ENCOUNTER — HOSPITAL ENCOUNTER (OUTPATIENT)
Facility: CLINIC | Age: 36
Discharge: HOME | End: 2021-10-28
Attending: OBSTETRICS & GYNECOLOGY | Admitting: OBSTETRICS & GYNECOLOGY
Payer: COMMERCIAL

## 2021-10-28 ENCOUNTER — NURSE TRIAGE (OUTPATIENT)
Dept: NURSING | Facility: CLINIC | Age: 36
End: 2021-10-28

## 2021-10-28 VITALS — RESPIRATION RATE: 16 BRPM | TEMPERATURE: 98.3 F

## 2021-10-28 PROBLEM — Z36.89 ENCOUNTER FOR TRIAGE IN PREGNANT PATIENT: Status: ACTIVE | Noted: 2021-10-28

## 2021-10-28 LAB
ALBUMIN SERPL-MCNC: 2.5 G/DL (ref 3.4–5)
ALBUMIN UR-MCNC: NEGATIVE MG/DL
ALP SERPL-CCNC: 66 U/L (ref 40–150)
ALT SERPL W P-5'-P-CCNC: 15 U/L (ref 0–50)
ANION GAP SERPL CALCULATED.3IONS-SCNC: 4 MMOL/L (ref 3–14)
APPEARANCE UR: CLEAR
AST SERPL W P-5'-P-CCNC: 13 U/L (ref 0–45)
BACTERIA #/AREA URNS HPF: ABNORMAL /HPF
BASOPHILS # BLD AUTO: 0 10E3/UL (ref 0–0.2)
BASOPHILS NFR BLD AUTO: 0 %
BILIRUB SERPL-MCNC: 0.2 MG/DL (ref 0.2–1.3)
BILIRUB UR QL STRIP: NEGATIVE
BUN SERPL-MCNC: 8 MG/DL (ref 7–30)
CALCIUM SERPL-MCNC: 8.6 MG/DL (ref 8.5–10.1)
CHLORIDE BLD-SCNC: 112 MMOL/L (ref 94–109)
CLUE CELLS: ABNORMAL
CO2 SERPL-SCNC: 24 MMOL/L (ref 20–32)
COLOR UR AUTO: ABNORMAL
CREAT SERPL-MCNC: 0.57 MG/DL (ref 0.52–1.04)
EOSINOPHIL # BLD AUTO: 0.1 10E3/UL (ref 0–0.7)
EOSINOPHIL NFR BLD AUTO: 2 %
ERYTHROCYTE [DISTWIDTH] IN BLOOD BY AUTOMATED COUNT: 15.5 % (ref 10–15)
GFR SERPL CREATININE-BSD FRML MDRD: >90 ML/MIN/1.73M2
GLUCOSE BLD-MCNC: 103 MG/DL (ref 70–99)
GLUCOSE UR STRIP-MCNC: NEGATIVE MG/DL
HCT VFR BLD AUTO: 37.1 % (ref 35–47)
HGB BLD-MCNC: 11.9 G/DL (ref 11.7–15.7)
HGB UR QL STRIP: NEGATIVE
IMM GRANULOCYTES # BLD: 0 10E3/UL
IMM GRANULOCYTES NFR BLD: 0 %
KETONES UR STRIP-MCNC: NEGATIVE MG/DL
LEUKOCYTE ESTERASE UR QL STRIP: ABNORMAL
LYMPHOCYTES # BLD AUTO: 1.6 10E3/UL (ref 0.8–5.3)
LYMPHOCYTES NFR BLD AUTO: 25 %
MCH RBC QN AUTO: 28.7 PG (ref 26.5–33)
MCHC RBC AUTO-ENTMCNC: 32.1 G/DL (ref 31.5–36.5)
MCV RBC AUTO: 90 FL (ref 78–100)
MONOCYTES # BLD AUTO: 0.4 10E3/UL (ref 0–1.3)
MONOCYTES NFR BLD AUTO: 6 %
NEUTROPHILS # BLD AUTO: 4.4 10E3/UL (ref 1.6–8.3)
NEUTROPHILS NFR BLD AUTO: 67 %
NITRATE UR QL: NEGATIVE
NRBC # BLD AUTO: 0 10E3/UL
NRBC BLD AUTO-RTO: 0 /100
PH UR STRIP: 6 [PH] (ref 5–7)
PLATELET # BLD AUTO: 178 10E3/UL (ref 150–450)
POTASSIUM BLD-SCNC: 3.9 MMOL/L (ref 3.4–5.3)
PROT SERPL-MCNC: 6.4 G/DL (ref 6.8–8.8)
RBC # BLD AUTO: 4.14 10E6/UL (ref 3.8–5.2)
RBC URINE: <1 /HPF
SODIUM SERPL-SCNC: 140 MMOL/L (ref 133–144)
SP GR UR STRIP: 1 (ref 1–1.03)
SQUAMOUS EPITHELIAL: 1 /HPF
TRICHOMONAS, WET PREP: ABNORMAL
UROBILINOGEN UR STRIP-MCNC: NORMAL MG/DL
WBC # BLD AUTO: 6.5 10E3/UL (ref 4–11)
WBC URINE: 1 /HPF
WBC'S/HIGH POWER FIELD, WET PREP: ABNORMAL
YEAST, WET PREP: ABNORMAL

## 2021-10-28 PROCEDURE — 87491 CHLMYD TRACH DNA AMP PROBE: CPT | Performed by: STUDENT IN AN ORGANIZED HEALTH CARE EDUCATION/TRAINING PROGRAM

## 2021-10-28 PROCEDURE — 87210 SMEAR WET MOUNT SALINE/INK: CPT | Performed by: STUDENT IN AN ORGANIZED HEALTH CARE EDUCATION/TRAINING PROGRAM

## 2021-10-28 PROCEDURE — 250N000013 HC RX MED GY IP 250 OP 250 PS 637: Performed by: STUDENT IN AN ORGANIZED HEALTH CARE EDUCATION/TRAINING PROGRAM

## 2021-10-28 PROCEDURE — 36415 COLL VENOUS BLD VENIPUNCTURE: CPT | Performed by: STUDENT IN AN ORGANIZED HEALTH CARE EDUCATION/TRAINING PROGRAM

## 2021-10-28 PROCEDURE — 99213 OFFICE O/P EST LOW 20 MIN: CPT | Mod: GC | Performed by: OBSTETRICS & GYNECOLOGY

## 2021-10-28 PROCEDURE — G0463 HOSPITAL OUTPT CLINIC VISIT: HCPCS

## 2021-10-28 PROCEDURE — 81001 URINALYSIS AUTO W/SCOPE: CPT | Performed by: STUDENT IN AN ORGANIZED HEALTH CARE EDUCATION/TRAINING PROGRAM

## 2021-10-28 PROCEDURE — 80053 COMPREHEN METABOLIC PANEL: CPT | Performed by: STUDENT IN AN ORGANIZED HEALTH CARE EDUCATION/TRAINING PROGRAM

## 2021-10-28 PROCEDURE — 85025 COMPLETE CBC W/AUTO DIFF WBC: CPT | Performed by: STUDENT IN AN ORGANIZED HEALTH CARE EDUCATION/TRAINING PROGRAM

## 2021-10-28 RX ORDER — HYDROXYZINE HYDROCHLORIDE 50 MG/1
50 TABLET, FILM COATED ORAL EVERY 6 HOURS PRN
Status: DISCONTINUED | OUTPATIENT
Start: 2021-10-28 | End: 2021-10-29 | Stop reason: HOSPADM

## 2021-10-28 RX ORDER — HYDROXYZINE HYDROCHLORIDE 25 MG/1
25 TABLET, FILM COATED ORAL EVERY 6 HOURS PRN
Status: DISCONTINUED | OUTPATIENT
Start: 2021-10-28 | End: 2021-10-29 | Stop reason: HOSPADM

## 2021-10-28 RX ORDER — ACETAMINOPHEN 325 MG/1
650 TABLET ORAL EVERY 4 HOURS PRN
Status: DISCONTINUED | OUTPATIENT
Start: 2021-10-28 | End: 2021-10-29 | Stop reason: HOSPADM

## 2021-10-28 RX ORDER — LIDOCAINE 4 G/G
1 PATCH TOPICAL
Status: DISCONTINUED | OUTPATIENT
Start: 2021-10-28 | End: 2021-10-29 | Stop reason: HOSPADM

## 2021-10-28 RX ORDER — LIDOCAINE 40 MG/G
CREAM TOPICAL
Status: DISCONTINUED | OUTPATIENT
Start: 2021-10-28 | End: 2021-10-29 | Stop reason: HOSPADM

## 2021-10-28 RX ADMIN — LIDOCAINE 1 PATCH: 246 PATCH TOPICAL at 22:25

## 2021-10-28 NOTE — TELEPHONE ENCOUNTER
OB Triage Call      Is patient's OB/Midwife with the [facility] or [facility]? [facility]- Proceed with triage     Reason for call:  Abdominal Pain    Assessment: Pt called with an  ID [id number] stating, since yesterday night she has been having abdominal pain on her belly area rated 6 or 7/10. Pt also reported pain when she touches her belly or bends down. Pt stated this is her third baby.     Plan: Per protocal pt was advised to go to Labor and delivery with  ID [id number], pt stated okay after consulting with her .     RN called [facility] spoke with Marilu and she stated okay pt to come in to L&D.        Patient plans to deliver at [facility]    Patient's primary OB Provider is,Rajni Meraz MD      Per protocol recommendations pt advised to go to L&D to be evaluated.     Is patient's delivering hospital on divert? No      34w0d    Estimated Date of Delivery: Dec 9, 2021        OB History    Para Term  AB Living   4 2 2 0 1 2   SAB TAB Ectopic Multiple Live Births   1 0 0 0 2      # Outcome Date GA Lbr Kranthi/2nd Weight Sex Delivery Anes PTL Lv   4 Current            3 Term 20 39w4d  3.66 kg (8 lb 1.1 oz) F CS-LTranv Spinal N PACO      Name: FIORELLAFEMALE-KATELYNN      Apgar1: 9  Apgar5: 9   2 Term 2017   3.402 kg (7 lb 8 oz)  CS-Unspec   PACO      Name: Kidus   1 SAB               Obstetric Comments   Son with Down's syndrome       Lab Results   Component Value Date    GBS Negative 2020          Dandy Castro RN 10/28/21 6:28 PM  [facility] Nurse Advisor      COVID 19 Nurse Triage Plan/Patient Instructions    Please be aware that novel coronavirus (COVID-19) may be circulating in the community. If you develop symptoms such as fever, cough, or SOB or if you have concerns about the presence of another infection including coronavirus (COVID-19), please contact your health care provider or visit [email or web address].      Disposition/Instructions    ED Visit recommended. Follow protocol based instructions.     Bring Your Own Device:  Please also bring your smart device(s) (smart phones, tablets, laptops) and their charging cables for your personal use and to communicate with your care team during your visit.    Thank you for taking steps to prevent the spread of this virus.  o Limit your contact with others.  o Wear a simple mask to cover your cough.  o Wash your hands well and often.    Resources    [facility]: About COVID-19: [email or web address]    CDC: What to Do If You're Sick: www.cdc.gov/coronavirus/2019-ncov/about/steps-when-sick.html    CDC: Ending Home Isolation: www.cdc.gov/coronavirus/2019-ncov/hcp/disposition-in-home-patients.html     CDC: Caring for Someone: www.cdc.gov/coronavirus/2019-ncov/if-you-are-sick/care-for-someone.html     [facility]: Interim Guidance for Hospital Discharge to Home: www.[email or web address]./diseases/coronavirus/hcp/hospdischarge.pdf    [facility] clinical trials (COVID-19 research studies): [email or web address]-trials     Below are the COVID-19 hotlines at the [place] Department of Health ([facility]). Interpreters are available.   o For health questions: Call 701-157-9421 or 1-462.855.2994 (7 a.m. to 7 p.m.)  o For questions about schools and childcare: Call 114-463-7378 or 1-988.283.2201 (7 a.m. to 7 p.m.)                   Reason for Disposition    MODERATE-SEVERE abdominal pain (e.g., interferes with normal activities, awakens from sleep)    Additional Information    Negative: Passed out (i.e., lost consciousness, collapsed and was not responding)    Negative: Shock suspected (e.g., cold/pale/clammy skin, too weak to stand, low BP, rapid pulse)    Negative: Difficult to awaken or acting confused (e.g., disoriented, slurred speech)    Negative: [1] SEVERE abdominal pain (e.g., excruciating) AND [2] constant AND [3] present > 1 hour    Negative: SEVERE  "vaginal bleeding (e.g., continuous red blood from vagina, large blood clots)    Negative: Sounds like a life-threatening emergency to the triager    Negative: Followed an abdomen (stomach) injury    Negative: [1] Having contractions or other symptoms of labor (such as vaginal pressure) AND [2] >= 37 weeks pregnant (i.e., term pregnancy)    Negative: [1] Having contractions or other symptoms of labor (such as vaginal pressure) AND [2] < 37 weeks pregnant (i.e., )    Negative: [1] Abdominal pain AND [2] pregnant < 20 weeks    Negative: [1] Vomiting AND [2] contains red blood or black (\"coffee ground\") material  (Exception: few red streaks in vomit that only happened once)    Protocols used: PREGNANCY - ABDOMINAL PAIN GREATER THAN 20 WEEKS EGA-A-AH      "

## 2021-10-29 LAB
C TRACH DNA SPEC QL PROBE+SIG AMP: NEGATIVE
N GONORRHOEA DNA SPEC QL NAA+PROBE: NEGATIVE

## 2021-10-29 NOTE — DISCHARGE INSTRUCTIONS
Discharge Instruction for Undelivered Patients      Diet:   Regular diet    Call your provider if you notice:  Swelling in your face or increased swelling in your hands or legs.  Headaches that are not relieved by Tylenol (acetaminophen).  Changes in your vision (blurring: seeing spots or stars.)  Nausea (sick to your stomach) and vomiting (throwing up).   Weight gain of 5 pounds or more per week.  Heartburn that doesn't go away.  Signs of bladder infection: pain when you urinate (use the toilet), need to go more often and more urgently.  The bag of campo (rupture of membranes) breaks, or you notice leaking in your underwear.  Bright red blood in your underwear.  Abdominal (lower belly) or stomach pain.  For first baby: Contractions (tightening) less than 5 minutes apart for one hour or more.  Second (plus) baby: Contractions (tightening) less than 10 minutes apart and getting stronger.  *If less than 34 weeks: Contractions (tightening) more than 6 times in one hour.  Increase or change in vaginal discharge (note the color and amount)

## 2021-10-29 NOTE — PLAN OF CARE
Pt arrived to triage c/o abdominal pain around the umbilicus. Painful since last night, feels better while laying down. Declined tylenol. Feels slightly better with a cold compress and agreeable to trying a lidocaine patch. Instructed pt to take it off after 12 hours. Cervix closed, will recheck after 2 hours. Cat I strip with 1 contraction over hours. No other complaints. Anticipating discharge home after cervical recheck.

## 2021-11-01 DIAGNOSIS — O34.219 PREVIOUS CESAREAN DELIVERY, ANTEPARTUM CONDITION OR COMPLICATION: Primary | ICD-10-CM

## 2021-11-02 DIAGNOSIS — Z11.59 ENCOUNTER FOR SCREENING FOR OTHER VIRAL DISEASES: ICD-10-CM

## 2021-11-03 ENCOUNTER — APPOINTMENT (OUTPATIENT)
Dept: INTERPRETER SERVICES | Facility: CLINIC | Age: 36
End: 2021-11-03
Payer: COMMERCIAL

## 2021-11-03 ENCOUNTER — TELEPHONE (OUTPATIENT)
Dept: OBGYN | Facility: CLINIC | Age: 36
End: 2021-11-03

## 2021-11-03 ENCOUNTER — TELEPHONE (OUTPATIENT)
Dept: OBGYN | Facility: CLINIC | Age: 36
End: 2021-11-03
Payer: COMMERCIAL

## 2021-11-03 RX ORDER — CITRIC ACID/SODIUM CITRATE 334-500MG
30 SOLUTION, ORAL ORAL
Status: CANCELLED | OUTPATIENT
Start: 2021-12-02

## 2021-11-03 RX ORDER — OXYTOCIN/0.9 % SODIUM CHLORIDE 30/500 ML
340 PLASTIC BAG, INJECTION (ML) INTRAVENOUS CONTINUOUS PRN
Status: CANCELLED | OUTPATIENT
Start: 2021-12-02

## 2021-11-03 RX ORDER — LIDOCAINE 40 MG/G
CREAM TOPICAL
Status: CANCELLED | OUTPATIENT
Start: 2021-12-02

## 2021-11-03 RX ORDER — CEFAZOLIN SODIUM 2 G/100ML
2 INJECTION, SOLUTION INTRAVENOUS SEE ADMIN INSTRUCTIONS
Status: CANCELLED | OUTPATIENT
Start: 2021-12-02

## 2021-11-03 RX ORDER — MISOPROSTOL 200 UG/1
800 TABLET ORAL
Status: CANCELLED | OUTPATIENT
Start: 2021-12-02

## 2021-11-03 RX ORDER — OXYTOCIN/0.9 % SODIUM CHLORIDE 30/500 ML
100-340 PLASTIC BAG, INJECTION (ML) INTRAVENOUS CONTINUOUS PRN
Status: CANCELLED | OUTPATIENT
Start: 2021-12-02

## 2021-11-03 RX ORDER — METHYLERGONOVINE MALEATE 0.2 MG/ML
200 INJECTION INTRAVENOUS
Status: CANCELLED | OUTPATIENT
Start: 2021-12-02

## 2021-11-03 RX ORDER — OXYTOCIN 10 [USP'U]/ML
10 INJECTION, SOLUTION INTRAMUSCULAR; INTRAVENOUS
Status: CANCELLED | OUTPATIENT
Start: 2021-12-02

## 2021-11-03 RX ORDER — TRANEXAMIC ACID 10 MG/ML
1 INJECTION, SOLUTION INTRAVENOUS EVERY 30 MIN PRN
Status: CANCELLED | OUTPATIENT
Start: 2021-12-02

## 2021-11-03 RX ORDER — ACETAMINOPHEN 325 MG/1
975 TABLET ORAL ONCE
Status: CANCELLED | OUTPATIENT
Start: 2021-12-02

## 2021-11-03 RX ORDER — MISOPROSTOL 200 UG/1
400 TABLET ORAL
Status: CANCELLED | OUTPATIENT
Start: 2021-12-02

## 2021-11-03 RX ORDER — CEFAZOLIN SODIUM 2 G/100ML
2 INJECTION, SOLUTION INTRAVENOUS
Status: CANCELLED | OUTPATIENT
Start: 2021-12-02

## 2021-11-03 RX ORDER — SODIUM CHLORIDE, SODIUM LACTATE, POTASSIUM CHLORIDE, CALCIUM CHLORIDE 600; 310; 30; 20 MG/100ML; MG/100ML; MG/100ML; MG/100ML
INJECTION, SOLUTION INTRAVENOUS CONTINUOUS
Status: CANCELLED | OUTPATIENT
Start: 2021-12-02

## 2021-11-03 RX ORDER — CARBOPROST TROMETHAMINE 250 UG/ML
250 INJECTION, SOLUTION INTRAMUSCULAR
Status: CANCELLED | OUTPATIENT
Start: 2021-12-02

## 2021-11-03 NOTE — TELEPHONE ENCOUNTER
Called patient to schedule c/s and patient is under the impression she should be having US on or around 11/09/21. No US scheduled and no orders place for one.     Routing to  for US ordering if appropriate.       [facility]  Surgery Scheduler

## 2021-11-03 NOTE — TELEPHONE ENCOUNTER
Type of surgery: ob  Location of surgery: [facility] OR  Date and time of surgery: 12/02/21 8:30AM  Surgeon: Kt  Pre-Op Appt Date: surgeon  Post-Op Appt Date: 01/18/21    Packet sent out: will  at next [facility]  Pre-cert/Authorization completed:  Not Applicable  Date: 11/03/21     [facility]  Surgery Scheduler

## 2021-11-09 ENCOUNTER — PRENATAL OFFICE VISIT (OUTPATIENT)
Dept: OBGYN | Facility: CLINIC | Age: 36
End: 2021-11-09
Payer: COMMERCIAL

## 2021-11-09 VITALS
BODY MASS INDEX: 29.26 KG/M2 | SYSTOLIC BLOOD PRESSURE: 106 MMHG | HEART RATE: 89 BPM | TEMPERATURE: 97.5 F | DIASTOLIC BLOOD PRESSURE: 63 MMHG | WEIGHT: 160 LBS

## 2021-11-09 DIAGNOSIS — O09.529 HIGH-RISK PREGNANCY, ELDERLY MULTIGRAVIDA, UNSPECIFIED TRIMESTER: Primary | ICD-10-CM

## 2021-11-09 LAB — HGB BLD-MCNC: 12.3 G/DL (ref 11.7–15.7)

## 2021-11-09 PROCEDURE — 99207 PR PRENATAL VISIT: CPT | Performed by: OBSTETRICS & GYNECOLOGY

## 2021-11-09 PROCEDURE — 36415 COLL VENOUS BLD VENIPUNCTURE: CPT | Performed by: OBSTETRICS & GYNECOLOGY

## 2021-11-09 PROCEDURE — 87653 STREP B DNA AMP PROBE: CPT | Performed by: OBSTETRICS & GYNECOLOGY

## 2021-11-09 NOTE — PROGRESS NOTES
35w5d  No complaints.  Baby is moving well.  Missed call to schedule MFM US so did that today at visit.  Has MFM US next week.   Has repeat  section scheduled for 39 wks.   GBS and hgb today.  RR

## 2021-11-10 LAB
GP B STREP DNA SPEC QL NAA+PROBE: NEGATIVE
PATIENT PENICILLIN, AMOXICILLIN, CEPHALOSPORINS ALLERGY: NO

## 2021-11-11 ENCOUNTER — PRE VISIT (OUTPATIENT)
Dept: MATERNAL FETAL MEDICINE | Facility: CLINIC | Age: 36
End: 2021-11-11
Payer: COMMERCIAL

## 2021-11-17 ENCOUNTER — HOSPITAL ENCOUNTER (OUTPATIENT)
Dept: ULTRASOUND IMAGING | Facility: CLINIC | Age: 36
End: 2021-11-17
Attending: OBSTETRICS & GYNECOLOGY
Payer: COMMERCIAL

## 2021-11-17 ENCOUNTER — PRENATAL OFFICE VISIT (OUTPATIENT)
Dept: OBGYN | Facility: CLINIC | Age: 36
End: 2021-11-17
Payer: COMMERCIAL

## 2021-11-17 ENCOUNTER — OFFICE VISIT (OUTPATIENT)
Dept: MATERNAL FETAL MEDICINE | Facility: CLINIC | Age: 36
End: 2021-11-17
Attending: OBSTETRICS & GYNECOLOGY
Payer: COMMERCIAL

## 2021-11-17 VITALS
HEIGHT: 62 IN | SYSTOLIC BLOOD PRESSURE: 104 MMHG | BODY MASS INDEX: 29.26 KG/M2 | WEIGHT: 159 LBS | DIASTOLIC BLOOD PRESSURE: 65 MMHG | HEART RATE: 95 BPM

## 2021-11-17 DIAGNOSIS — O26.90 PREGNANCY RELATED CONDITION, ANTEPARTUM: ICD-10-CM

## 2021-11-17 DIAGNOSIS — O34.219 PREVIOUS CESAREAN DELIVERY, ANTEPARTUM CONDITION OR COMPLICATION: ICD-10-CM

## 2021-11-17 DIAGNOSIS — O09.529 HIGH-RISK PREGNANCY, ELDERLY MULTIGRAVIDA, UNSPECIFIED TRIMESTER: Primary | ICD-10-CM

## 2021-11-17 DIAGNOSIS — O09.523 MULTIGRAVIDA OF ADVANCED MATERNAL AGE IN THIRD TRIMESTER: Primary | ICD-10-CM

## 2021-11-17 PROCEDURE — 99207 PR PRENATAL VISIT: CPT | Performed by: OBSTETRICS & GYNECOLOGY

## 2021-11-17 PROCEDURE — 76811 OB US DETAILED SNGL FETUS: CPT | Mod: 26 | Performed by: OBSTETRICS & GYNECOLOGY

## 2021-11-17 PROCEDURE — 76811 OB US DETAILED SNGL FETUS: CPT

## 2021-11-17 RX ORDER — IBUPROFEN 600 MG/1
600 TABLET, FILM COATED ORAL EVERY 6 HOURS PRN
Qty: 60 TABLET | Refills: 0 | Status: SHIPPED | OUTPATIENT
Start: 2021-11-17 | End: 2022-04-28

## 2021-11-17 RX ORDER — ACETAMINOPHEN 325 MG/1
650 TABLET ORAL EVERY 6 HOURS PRN
Qty: 100 TABLET | Refills: 0 | Status: SHIPPED | OUTPATIENT
Start: 2021-11-17 | End: 2022-04-28

## 2021-11-17 RX ORDER — AMOXICILLIN 250 MG
1 CAPSULE ORAL DAILY
Qty: 100 TABLET | Refills: 0 | Status: SHIPPED | OUTPATIENT
Start: 2021-11-17 | End: 2022-04-28

## 2021-11-17 ASSESSMENT — MIFFLIN-ST. JEOR: SCORE: 1354.47

## 2021-11-17 NOTE — PROGRESS NOTES
36w6d  complains of Left back pain.  Baby is moving well. No contractions.    MFM US today showed normal anatomy, fluid and growth at 30%.    Plan on breastfeeding? Yes, breast fed the other children.   Birthcontrol? Patient will think about it, gave info on IUD, declines PPTL with  section.   Gender on ultrasound? boy  Circumsion? Yes, discussed in clinic   Peds doc? Dr Cooper at [facility]   PP meds sent to her pharmacy today. RR

## 2021-11-17 NOTE — PROGRESS NOTES
Please refer to ultrasound report under 'Imaging' Studies of 'Chart Review' tabs.    Jono Riggs M.D.

## 2021-11-24 ENCOUNTER — PRENATAL OFFICE VISIT (OUTPATIENT)
Dept: OBGYN | Facility: CLINIC | Age: 36
End: 2021-11-24
Payer: COMMERCIAL

## 2021-11-24 VITALS
SYSTOLIC BLOOD PRESSURE: 108 MMHG | BODY MASS INDEX: 30 KG/M2 | DIASTOLIC BLOOD PRESSURE: 66 MMHG | HEIGHT: 62 IN | WEIGHT: 163 LBS | HEART RATE: 94 BPM

## 2021-11-24 DIAGNOSIS — O34.219 PREVIOUS CESAREAN DELIVERY, ANTEPARTUM CONDITION OR COMPLICATION: ICD-10-CM

## 2021-11-24 DIAGNOSIS — O09.529 HIGH-RISK PREGNANCY, ELDERLY MULTIGRAVIDA, UNSPECIFIED TRIMESTER: Primary | ICD-10-CM

## 2021-11-24 DIAGNOSIS — Z01.818 PRE-OP EXAM: Primary | ICD-10-CM

## 2021-11-24 PROCEDURE — 99207 PR PRENATAL VISIT: CPT | Performed by: OBSTETRICS & GYNECOLOGY

## 2021-11-24 PROCEDURE — 59425 ANTEPARTUM CARE ONLY: CPT | Performed by: OBSTETRICS & GYNECOLOGY

## 2021-11-24 ASSESSMENT — MIFFLIN-ST. JEOR: SCORE: 1372.61

## 2021-11-24 NOTE — PROGRESS NOTES
37w6d  No complaints. Baby is moving well.  Has repeat  section next week.  No questions.  We reviewed normal expectations for 3rd  section.  She is not interested in PPTL. RR

## 2021-11-30 ENCOUNTER — LAB (OUTPATIENT)
Dept: LAB | Facility: CLINIC | Age: 36
End: 2021-11-30
Attending: OBSTETRICS & GYNECOLOGY
Payer: COMMERCIAL

## 2021-11-30 DIAGNOSIS — O34.219 PREVIOUS CESAREAN DELIVERY, ANTEPARTUM CONDITION OR COMPLICATION: ICD-10-CM

## 2021-11-30 DIAGNOSIS — Z11.59 ENCOUNTER FOR SCREENING FOR OTHER VIRAL DISEASES: ICD-10-CM

## 2021-11-30 DIAGNOSIS — Z01.818 PRE-OP EXAM: ICD-10-CM

## 2021-11-30 LAB
ABO/RH(D): NORMAL
ANTIBODY SCREEN: NEGATIVE
ERYTHROCYTE [DISTWIDTH] IN BLOOD BY AUTOMATED COUNT: 15.9 % (ref 10–15)
HCT VFR BLD AUTO: 39.4 % (ref 35–47)
HGB BLD-MCNC: 12.6 G/DL (ref 11.7–15.7)
MCH RBC QN AUTO: 29.2 PG (ref 26.5–33)
MCHC RBC AUTO-ENTMCNC: 32 G/DL (ref 31.5–36.5)
MCV RBC AUTO: 91 FL (ref 78–100)
PLATELET # BLD AUTO: 154 10E3/UL (ref 150–450)
RBC # BLD AUTO: 4.31 10E6/UL (ref 3.8–5.2)
SPECIMEN EXPIRATION DATE: NORMAL
WBC # BLD AUTO: 6.2 10E3/UL (ref 4–11)

## 2021-11-30 PROCEDURE — 86780 TREPONEMA PALLIDUM: CPT

## 2021-11-30 PROCEDURE — 85027 COMPLETE CBC AUTOMATED: CPT

## 2021-11-30 PROCEDURE — 86850 RBC ANTIBODY SCREEN: CPT

## 2021-11-30 PROCEDURE — U0003 INFECTIOUS AGENT DETECTION BY NUCLEIC ACID (DNA OR RNA); SEVERE ACUTE RESPIRATORY SYNDROME CORONAVIRUS 2 (SARS-COV-2) (CORONAVIRUS DISEASE [COVID-19]), AMPLIFIED PROBE TECHNIQUE, MAKING USE OF HIGH THROUGHPUT TECHNOLOGIES AS DESCRIBED BY CMS-2020-01-R: HCPCS

## 2021-11-30 PROCEDURE — 36415 COLL VENOUS BLD VENIPUNCTURE: CPT

## 2021-11-30 PROCEDURE — U0005 INFEC AGEN DETEC AMPLI PROBE: HCPCS

## 2021-11-30 PROCEDURE — 86901 BLOOD TYPING SEROLOGIC RH(D): CPT

## 2021-11-30 PROCEDURE — 86900 BLOOD TYPING SEROLOGIC ABO: CPT

## 2021-12-01 ENCOUNTER — ANESTHESIA EVENT (OUTPATIENT)
Dept: OBGYN | Facility: CLINIC | Age: 36
End: 2021-12-01
Payer: COMMERCIAL

## 2021-12-01 LAB
SARS-COV-2 RNA RESP QL NAA+PROBE: NEGATIVE
T PALLIDUM AB SER QL: NONREACTIVE

## 2021-12-02 ENCOUNTER — HOSPITAL ENCOUNTER (INPATIENT)
Facility: CLINIC | Age: 36
LOS: 2 days | Discharge: HOME | End: 2021-12-04
Attending: OBSTETRICS & GYNECOLOGY | Admitting: OBSTETRICS & GYNECOLOGY
Payer: COMMERCIAL

## 2021-12-02 ENCOUNTER — ANESTHESIA (OUTPATIENT)
Dept: OBGYN | Facility: CLINIC | Age: 36
End: 2021-12-02
Payer: COMMERCIAL

## 2021-12-02 DIAGNOSIS — Z98.891 STATUS POST CESAREAN SECTION: Primary | ICD-10-CM

## 2021-12-02 DIAGNOSIS — Z36.89 ENCOUNTER FOR TRIAGE IN PREGNANT PATIENT: ICD-10-CM

## 2021-12-02 LAB
BLD PROD TYP BPU: NORMAL
BLD PROD TYP BPU: NORMAL
BLOOD COMPONENT TYPE: NORMAL
BLOOD COMPONENT TYPE: NORMAL
CODING SYSTEM: NORMAL
CODING SYSTEM: NORMAL
CROSSMATCH: NORMAL
CROSSMATCH: NORMAL
UNIT ABO/RH: NORMAL
UNIT ABO/RH: NORMAL
UNIT NUMBER: NORMAL
UNIT NUMBER: NORMAL
UNIT STATUS: NORMAL
UNIT STATUS: NORMAL
UNIT TYPE ISBT: 5100
UNIT TYPE ISBT: 5100

## 2021-12-02 PROCEDURE — 258N000003 HC RX IP 258 OP 636: Performed by: STUDENT IN AN ORGANIZED HEALTH CARE EDUCATION/TRAINING PROGRAM

## 2021-12-02 PROCEDURE — 59515 CESAREAN DELIVERY: CPT | Mod: GC | Performed by: OBSTETRICS & GYNECOLOGY

## 2021-12-02 PROCEDURE — 86923 COMPATIBILITY TEST ELECTRIC: CPT | Performed by: STUDENT IN AN ORGANIZED HEALTH CARE EDUCATION/TRAINING PROGRAM

## 2021-12-02 PROCEDURE — 250N000011 HC RX IP 250 OP 636: Performed by: STUDENT IN AN ORGANIZED HEALTH CARE EDUCATION/TRAINING PROGRAM

## 2021-12-02 PROCEDURE — C9290 INJ, BUPIVACAINE LIPOSOME: HCPCS

## 2021-12-02 PROCEDURE — 250N000009 HC RX 250: Performed by: STUDENT IN AN ORGANIZED HEALTH CARE EDUCATION/TRAINING PROGRAM

## 2021-12-02 PROCEDURE — 250N000013 HC RX MED GY IP 250 OP 250 PS 637: Performed by: STUDENT IN AN ORGANIZED HEALTH CARE EDUCATION/TRAINING PROGRAM

## 2021-12-02 PROCEDURE — 271N000001 HC OR GENERAL SUPPLY NON-STERILE: Performed by: OBSTETRICS & GYNECOLOGY

## 2021-12-02 PROCEDURE — 258N000003 HC RX IP 258 OP 636

## 2021-12-02 PROCEDURE — 710N000010 HC RECOVERY PHASE 1, LEVEL 2, PER MIN: Performed by: OBSTETRICS & GYNECOLOGY

## 2021-12-02 PROCEDURE — 250N000011 HC RX IP 250 OP 636

## 2021-12-02 PROCEDURE — 370N000017 HC ANESTHESIA TECHNICAL FEE, PER MIN: Performed by: OBSTETRICS & GYNECOLOGY

## 2021-12-02 PROCEDURE — 120N000002 HC R&B MED SURG/OB UMMC

## 2021-12-02 PROCEDURE — C1765 ADHESION BARRIER: HCPCS | Performed by: OBSTETRICS & GYNECOLOGY

## 2021-12-02 PROCEDURE — 360N000076 HC SURGERY LEVEL 3, PER MIN: Performed by: OBSTETRICS & GYNECOLOGY

## 2021-12-02 PROCEDURE — 272N000001 HC OR GENERAL SUPPLY STERILE: Performed by: OBSTETRICS & GYNECOLOGY

## 2021-12-02 PROCEDURE — 999N000141 HC STATISTIC PRE-PROCEDURE NURSING ASSESSMENT: Performed by: OBSTETRICS & GYNECOLOGY

## 2021-12-02 RX ORDER — SIMETHICONE 80 MG
80 TABLET,CHEWABLE ORAL 4 TIMES DAILY PRN
Status: DISCONTINUED | OUTPATIENT
Start: 2021-12-02 | End: 2021-12-04 | Stop reason: HOSPADM

## 2021-12-02 RX ORDER — SODIUM CHLORIDE, SODIUM LACTATE, POTASSIUM CHLORIDE, CALCIUM CHLORIDE 600; 310; 30; 20 MG/100ML; MG/100ML; MG/100ML; MG/100ML
INJECTION, SOLUTION INTRAVENOUS CONTINUOUS PRN
Status: DISCONTINUED | OUTPATIENT
Start: 2021-12-02 | End: 2021-12-02

## 2021-12-02 RX ORDER — MORPHINE SULFATE 1 MG/ML
INJECTION, SOLUTION EPIDURAL; INTRATHECAL; INTRAVENOUS PRN
Status: DISCONTINUED | OUTPATIENT
Start: 2021-12-02 | End: 2021-12-02

## 2021-12-02 RX ORDER — HYDROXYZINE HYDROCHLORIDE 25 MG/1
25 TABLET, FILM COATED ORAL EVERY 6 HOURS PRN
Status: DISCONTINUED | OUTPATIENT
Start: 2021-12-02 | End: 2021-12-02 | Stop reason: HOSPADM

## 2021-12-02 RX ORDER — NALOXONE HYDROCHLORIDE 0.4 MG/ML
0.2 INJECTION, SOLUTION INTRAMUSCULAR; INTRAVENOUS; SUBCUTANEOUS
Status: DISCONTINUED | OUTPATIENT
Start: 2021-12-02 | End: 2021-12-02

## 2021-12-02 RX ORDER — METOCLOPRAMIDE 10 MG/1
10 TABLET ORAL EVERY 6 HOURS PRN
Status: DISCONTINUED | OUTPATIENT
Start: 2021-12-02 | End: 2021-12-04 | Stop reason: HOSPADM

## 2021-12-02 RX ORDER — OXYTOCIN/0.9 % SODIUM CHLORIDE 30/500 ML
340 PLASTIC BAG, INJECTION (ML) INTRAVENOUS CONTINUOUS PRN
Status: DISCONTINUED | OUTPATIENT
Start: 2021-12-02 | End: 2021-12-02 | Stop reason: HOSPADM

## 2021-12-02 RX ORDER — METHYLERGONOVINE MALEATE 0.2 MG/ML
200 INJECTION INTRAVENOUS
Status: DISCONTINUED | OUTPATIENT
Start: 2021-12-02 | End: 2021-12-04 | Stop reason: HOSPADM

## 2021-12-02 RX ORDER — MISOPROSTOL 200 UG/1
400 TABLET ORAL
Status: DISCONTINUED | OUTPATIENT
Start: 2021-12-02 | End: 2021-12-02 | Stop reason: HOSPADM

## 2021-12-02 RX ORDER — TRANEXAMIC ACID 10 MG/ML
1 INJECTION, SOLUTION INTRAVENOUS EVERY 30 MIN PRN
Status: DISCONTINUED | OUTPATIENT
Start: 2021-12-02 | End: 2021-12-02 | Stop reason: HOSPADM

## 2021-12-02 RX ORDER — OXYTOCIN/0.9 % SODIUM CHLORIDE 30/500 ML
100-340 PLASTIC BAG, INJECTION (ML) INTRAVENOUS CONTINUOUS PRN
Status: DISCONTINUED | OUTPATIENT
Start: 2021-12-02 | End: 2021-12-02

## 2021-12-02 RX ORDER — LIDOCAINE 40 MG/G
CREAM TOPICAL
Status: DISCONTINUED | OUTPATIENT
Start: 2021-12-02 | End: 2021-12-02 | Stop reason: HOSPADM

## 2021-12-02 RX ORDER — OXYTOCIN/0.9 % SODIUM CHLORIDE 30/500 ML
PLASTIC BAG, INJECTION (ML) INTRAVENOUS CONTINUOUS PRN
Status: DISCONTINUED | OUTPATIENT
Start: 2021-12-02 | End: 2021-12-02

## 2021-12-02 RX ORDER — ACETAMINOPHEN 325 MG/1
975 TABLET ORAL EVERY 6 HOURS
Status: DISCONTINUED | OUTPATIENT
Start: 2021-12-02 | End: 2021-12-04 | Stop reason: HOSPADM

## 2021-12-02 RX ORDER — HYDROCORTISONE 2.5 %
CREAM (GRAM) TOPICAL 3 TIMES DAILY PRN
Status: DISCONTINUED | OUTPATIENT
Start: 2021-12-02 | End: 2021-12-04 | Stop reason: HOSPADM

## 2021-12-02 RX ORDER — CARBOPROST TROMETHAMINE 250 UG/ML
250 INJECTION, SOLUTION INTRAMUSCULAR
Status: DISCONTINUED | OUTPATIENT
Start: 2021-12-02 | End: 2021-12-04 | Stop reason: HOSPADM

## 2021-12-02 RX ORDER — METOCLOPRAMIDE HYDROCHLORIDE 5 MG/ML
10 INJECTION INTRAMUSCULAR; INTRAVENOUS EVERY 6 HOURS PRN
Status: DISCONTINUED | OUTPATIENT
Start: 2021-12-02 | End: 2021-12-04 | Stop reason: HOSPADM

## 2021-12-02 RX ORDER — ACETAMINOPHEN 325 MG/1
975 TABLET ORAL ONCE
Status: COMPLETED | OUTPATIENT
Start: 2021-12-02 | End: 2021-12-02

## 2021-12-02 RX ORDER — ONDANSETRON 4 MG/1
4 TABLET, ORALLY DISINTEGRATING ORAL EVERY 6 HOURS PRN
Status: DISCONTINUED | OUTPATIENT
Start: 2021-12-02 | End: 2021-12-04 | Stop reason: HOSPADM

## 2021-12-02 RX ORDER — METHYLERGONOVINE MALEATE 0.2 MG/ML
200 INJECTION INTRAVENOUS
Status: DISCONTINUED | OUTPATIENT
Start: 2021-12-02 | End: 2021-12-02 | Stop reason: HOSPADM

## 2021-12-02 RX ORDER — SODIUM CHLORIDE, SODIUM LACTATE, POTASSIUM CHLORIDE, CALCIUM CHLORIDE 600; 310; 30; 20 MG/100ML; MG/100ML; MG/100ML; MG/100ML
INJECTION, SOLUTION INTRAVENOUS CONTINUOUS
Status: DISCONTINUED | OUTPATIENT
Start: 2021-12-02 | End: 2021-12-02 | Stop reason: HOSPADM

## 2021-12-02 RX ORDER — OXYTOCIN/0.9 % SODIUM CHLORIDE 30/500 ML
340 PLASTIC BAG, INJECTION (ML) INTRAVENOUS CONTINUOUS PRN
Status: DISCONTINUED | OUTPATIENT
Start: 2021-12-02 | End: 2021-12-04 | Stop reason: HOSPADM

## 2021-12-02 RX ORDER — BUPIVACAINE HYDROCHLORIDE 2.5 MG/ML
INJECTION, SOLUTION EPIDURAL; INFILTRATION; INTRACAUDAL
Status: COMPLETED | OUTPATIENT
Start: 2021-12-02 | End: 2021-12-02

## 2021-12-02 RX ORDER — BUPIVACAINE HYDROCHLORIDE 7.5 MG/ML
INJECTION, SOLUTION INTRASPINAL PRN
Status: DISCONTINUED | OUTPATIENT
Start: 2021-12-02 | End: 2021-12-02

## 2021-12-02 RX ORDER — LABETALOL HYDROCHLORIDE 5 MG/ML
10 INJECTION, SOLUTION INTRAVENOUS
Status: DISCONTINUED | OUTPATIENT
Start: 2021-12-02 | End: 2021-12-02 | Stop reason: HOSPADM

## 2021-12-02 RX ORDER — ONDANSETRON 4 MG/1
4 TABLET, ORALLY DISINTEGRATING ORAL EVERY 30 MIN PRN
Status: DISCONTINUED | OUTPATIENT
Start: 2021-12-02 | End: 2021-12-02 | Stop reason: HOSPADM

## 2021-12-02 RX ORDER — ONDANSETRON 2 MG/ML
4 INJECTION INTRAMUSCULAR; INTRAVENOUS EVERY 30 MIN PRN
Status: DISCONTINUED | OUTPATIENT
Start: 2021-12-02 | End: 2021-12-02 | Stop reason: HOSPADM

## 2021-12-02 RX ORDER — METHYLERGONOVINE MALEATE 0.2 MG/ML
INJECTION INTRAVENOUS
Status: DISCONTINUED
Start: 2021-12-02 | End: 2021-12-02 | Stop reason: HOSPADM

## 2021-12-02 RX ORDER — MISOPROSTOL 200 UG/1
400 TABLET ORAL
Status: DISCONTINUED | OUTPATIENT
Start: 2021-12-02 | End: 2021-12-04 | Stop reason: HOSPADM

## 2021-12-02 RX ORDER — CEFAZOLIN SODIUM 2 G/100ML
2 INJECTION, SOLUTION INTRAVENOUS SEE ADMIN INSTRUCTIONS
Status: DISCONTINUED | OUTPATIENT
Start: 2021-12-02 | End: 2021-12-02 | Stop reason: HOSPADM

## 2021-12-02 RX ORDER — DEXTROSE, SODIUM CHLORIDE, SODIUM LACTATE, POTASSIUM CHLORIDE, AND CALCIUM CHLORIDE 5; .6; .31; .03; .02 G/100ML; G/100ML; G/100ML; G/100ML; G/100ML
INJECTION, SOLUTION INTRAVENOUS CONTINUOUS
Status: DISCONTINUED | OUTPATIENT
Start: 2021-12-02 | End: 2021-12-04 | Stop reason: HOSPADM

## 2021-12-02 RX ORDER — MISOPROSTOL 200 UG/1
800 TABLET ORAL
Status: DISCONTINUED | OUTPATIENT
Start: 2021-12-02 | End: 2021-12-02 | Stop reason: HOSPADM

## 2021-12-02 RX ORDER — PROCHLORPERAZINE 25 MG
25 SUPPOSITORY, RECTAL RECTAL EVERY 12 HOURS PRN
Status: DISCONTINUED | OUTPATIENT
Start: 2021-12-02 | End: 2021-12-04 | Stop reason: HOSPADM

## 2021-12-02 RX ORDER — IBUPROFEN 800 MG/1
800 TABLET, FILM COATED ORAL EVERY 6 HOURS
Status: DISCONTINUED | OUTPATIENT
Start: 2021-12-03 | End: 2021-12-04 | Stop reason: HOSPADM

## 2021-12-02 RX ORDER — MORPHINE SULFATE 1 MG/ML
INJECTION, SOLUTION EPIDURAL; INTRATHECAL; INTRAVENOUS
Status: COMPLETED | OUTPATIENT
Start: 2021-12-02 | End: 2021-12-02

## 2021-12-02 RX ORDER — OXYCODONE HYDROCHLORIDE 5 MG/1
5 TABLET ORAL EVERY 4 HOURS PRN
Status: DISCONTINUED | OUTPATIENT
Start: 2021-12-02 | End: 2021-12-04 | Stop reason: HOSPADM

## 2021-12-02 RX ORDER — FENTANYL CITRATE-0.9 % NACL/PF 10 MCG/ML
100 PLASTIC BAG, INJECTION (ML) INTRAVENOUS EVERY 5 MIN PRN
Status: DISCONTINUED | OUTPATIENT
Start: 2021-12-02 | End: 2021-12-02

## 2021-12-02 RX ORDER — FENTANYL CITRATE 50 UG/ML
INJECTION, SOLUTION INTRAMUSCULAR; INTRAVENOUS PRN
Status: DISCONTINUED | OUTPATIENT
Start: 2021-12-02 | End: 2021-12-02

## 2021-12-02 RX ORDER — FENTANYL CITRATE-0.9 % NACL/PF 10 MCG/ML
PLASTIC BAG, INJECTION (ML) INTRAVENOUS CONTINUOUS PRN
Status: DISCONTINUED | OUTPATIENT
Start: 2021-12-02 | End: 2021-12-02

## 2021-12-02 RX ORDER — BUPIVACAINE HYDROCHLORIDE 7.5 MG/ML
INJECTION, SOLUTION INTRASPINAL
Status: COMPLETED | OUTPATIENT
Start: 2021-12-02 | End: 2021-12-02

## 2021-12-02 RX ORDER — PROCHLORPERAZINE MALEATE 10 MG
10 TABLET ORAL EVERY 6 HOURS PRN
Status: DISCONTINUED | OUTPATIENT
Start: 2021-12-02 | End: 2021-12-04 | Stop reason: HOSPADM

## 2021-12-02 RX ORDER — NALOXONE HYDROCHLORIDE 0.4 MG/ML
0.4 INJECTION, SOLUTION INTRAMUSCULAR; INTRAVENOUS; SUBCUTANEOUS
Status: DISCONTINUED | OUTPATIENT
Start: 2021-12-02 | End: 2021-12-02

## 2021-12-02 RX ORDER — AMOXICILLIN 250 MG
2 CAPSULE ORAL 2 TIMES DAILY
Status: DISCONTINUED | OUTPATIENT
Start: 2021-12-02 | End: 2021-12-04 | Stop reason: HOSPADM

## 2021-12-02 RX ORDER — MISOPROSTOL 200 UG/1
800 TABLET ORAL
Status: DISCONTINUED | OUTPATIENT
Start: 2021-12-02 | End: 2021-12-04 | Stop reason: HOSPADM

## 2021-12-02 RX ORDER — LIDOCAINE 40 MG/G
CREAM TOPICAL
Status: DISCONTINUED | OUTPATIENT
Start: 2021-12-02 | End: 2021-12-04 | Stop reason: HOSPADM

## 2021-12-02 RX ORDER — CITRIC ACID/SODIUM CITRATE 334-500MG
30 SOLUTION, ORAL ORAL
Status: COMPLETED | OUTPATIENT
Start: 2021-12-02 | End: 2021-12-02

## 2021-12-02 RX ORDER — SODIUM CHLORIDE, SODIUM LACTATE, POTASSIUM CHLORIDE, CALCIUM CHLORIDE 600; 310; 30; 20 MG/100ML; MG/100ML; MG/100ML; MG/100ML
INJECTION, SOLUTION INTRAVENOUS CONTINUOUS
Status: DISCONTINUED | OUTPATIENT
Start: 2021-12-02 | End: 2021-12-02

## 2021-12-02 RX ORDER — TRANEXAMIC ACID 10 MG/ML
1 INJECTION, SOLUTION INTRAVENOUS EVERY 30 MIN PRN
Status: DISCONTINUED | OUTPATIENT
Start: 2021-12-02 | End: 2021-12-04 | Stop reason: HOSPADM

## 2021-12-02 RX ORDER — AMOXICILLIN 250 MG
1 CAPSULE ORAL 2 TIMES DAILY
Status: DISCONTINUED | OUTPATIENT
Start: 2021-12-02 | End: 2021-12-04 | Stop reason: HOSPADM

## 2021-12-02 RX ORDER — MODIFIED LANOLIN
OINTMENT (GRAM) TOPICAL
Status: DISCONTINUED | OUTPATIENT
Start: 2021-12-02 | End: 2021-12-04 | Stop reason: HOSPADM

## 2021-12-02 RX ORDER — MISOPROSTOL 200 UG/1
TABLET ORAL
Status: DISCONTINUED
Start: 2021-12-02 | End: 2021-12-02 | Stop reason: HOSPADM

## 2021-12-02 RX ORDER — KETOROLAC TROMETHAMINE 30 MG/ML
30 INJECTION, SOLUTION INTRAMUSCULAR; INTRAVENOUS EVERY 6 HOURS
Status: COMPLETED | OUTPATIENT
Start: 2021-12-02 | End: 2021-12-03

## 2021-12-02 RX ORDER — ONDANSETRON 4 MG/1
4 TABLET, ORALLY DISINTEGRATING ORAL EVERY 6 HOURS PRN
Status: DISCONTINUED | OUTPATIENT
Start: 2021-12-02 | End: 2021-12-02

## 2021-12-02 RX ORDER — LIDOCAINE 40 MG/G
CREAM TOPICAL
Status: DISCONTINUED | OUTPATIENT
Start: 2021-12-02 | End: 2021-12-02

## 2021-12-02 RX ORDER — OXYTOCIN 10 [USP'U]/ML
10 INJECTION, SOLUTION INTRAMUSCULAR; INTRAVENOUS
Status: DISCONTINUED | OUTPATIENT
Start: 2021-12-02 | End: 2021-12-02 | Stop reason: HOSPADM

## 2021-12-02 RX ORDER — OXYTOCIN 10 [USP'U]/ML
10 INJECTION, SOLUTION INTRAMUSCULAR; INTRAVENOUS
Status: DISCONTINUED | OUTPATIENT
Start: 2021-12-02 | End: 2021-12-02

## 2021-12-02 RX ORDER — OXYTOCIN 10 [USP'U]/ML
10 INJECTION, SOLUTION INTRAMUSCULAR; INTRAVENOUS
Status: DISCONTINUED | OUTPATIENT
Start: 2021-12-02 | End: 2021-12-04 | Stop reason: HOSPADM

## 2021-12-02 RX ORDER — ONDANSETRON 2 MG/ML
4 INJECTION INTRAMUSCULAR; INTRAVENOUS EVERY 6 HOURS PRN
Status: DISCONTINUED | OUTPATIENT
Start: 2021-12-02 | End: 2021-12-04 | Stop reason: HOSPADM

## 2021-12-02 RX ORDER — FENTANYL CITRATE 50 UG/ML
INJECTION, SOLUTION INTRAMUSCULAR; INTRAVENOUS
Status: COMPLETED | OUTPATIENT
Start: 2021-12-02 | End: 2021-12-02

## 2021-12-02 RX ORDER — BISACODYL 10 MG
10 SUPPOSITORY, RECTAL RECTAL DAILY PRN
Status: DISCONTINUED | OUTPATIENT
Start: 2021-12-04 | End: 2021-12-04 | Stop reason: HOSPADM

## 2021-12-02 RX ORDER — ONDANSETRON 2 MG/ML
4 INJECTION INTRAMUSCULAR; INTRAVENOUS EVERY 6 HOURS PRN
Status: DISCONTINUED | OUTPATIENT
Start: 2021-12-02 | End: 2021-12-02

## 2021-12-02 RX ORDER — NALBUPHINE HYDROCHLORIDE 10 MG/ML
2.5-5 INJECTION, SOLUTION INTRAMUSCULAR; INTRAVENOUS; SUBCUTANEOUS EVERY 6 HOURS PRN
Status: DISCONTINUED | OUTPATIENT
Start: 2021-12-02 | End: 2021-12-02

## 2021-12-02 RX ORDER — CARBOPROST TROMETHAMINE 250 UG/ML
250 INJECTION, SOLUTION INTRAMUSCULAR
Status: DISCONTINUED | OUTPATIENT
Start: 2021-12-02 | End: 2021-12-02 | Stop reason: HOSPADM

## 2021-12-02 RX ORDER — CEFAZOLIN SODIUM 2 G/100ML
2 INJECTION, SOLUTION INTRAVENOUS
Status: COMPLETED | OUTPATIENT
Start: 2021-12-02 | End: 2021-12-02

## 2021-12-02 RX ADMIN — SODIUM CHLORIDE, POTASSIUM CHLORIDE, SODIUM LACTATE AND CALCIUM CHLORIDE: 600; 310; 30; 20 INJECTION, SOLUTION INTRAVENOUS at 08:43

## 2021-12-02 RX ADMIN — SODIUM CITRATE AND CITRIC ACID MONOHYDRATE 30 ML: 500; 334 SOLUTION ORAL at 08:38

## 2021-12-02 RX ADMIN — BUPIVACAINE HYDROCHLORIDE 20 ML: 2.5 INJECTION, SOLUTION EPIDURAL; INFILTRATION; INTRACAUDAL at 07:58

## 2021-12-02 RX ADMIN — ACETAMINOPHEN 975 MG: 325 TABLET, FILM COATED ORAL at 08:30

## 2021-12-02 RX ADMIN — DOCUSATE SODIUM AND SENNOSIDES 1 TABLET: 8.6; 5 TABLET ORAL at 20:26

## 2021-12-02 RX ADMIN — FENTANYL CITRATE 10 MCG: 50 INJECTION, SOLUTION INTRAMUSCULAR; INTRAVENOUS at 07:57

## 2021-12-02 RX ADMIN — MORPHINE SULFATE 0.15 MG: 1 INJECTION EPIDURAL; INTRATHECAL; INTRAVENOUS at 08:59

## 2021-12-02 RX ADMIN — FENTANYL CITRATE 10 MCG: 50 INJECTION, SOLUTION INTRAMUSCULAR; INTRAVENOUS at 08:59

## 2021-12-02 RX ADMIN — KETOROLAC TROMETHAMINE 30 MG: 30 INJECTION, SOLUTION INTRAMUSCULAR; INTRAVENOUS at 16:28

## 2021-12-02 RX ADMIN — Medication 50 MCG/MIN: at 08:51

## 2021-12-02 RX ADMIN — Medication 100 ML/HR: at 13:18

## 2021-12-02 RX ADMIN — SODIUM CHLORIDE, POTASSIUM CHLORIDE, SODIUM LACTATE AND CALCIUM CHLORIDE: 600; 310; 30; 20 INJECTION, SOLUTION INTRAVENOUS at 07:36

## 2021-12-02 RX ADMIN — BUPIVACAINE 20 ML: 13.3 INJECTION, SUSPENSION, LIPOSOMAL INFILTRATION at 11:05

## 2021-12-02 RX ADMIN — MORPHINE SULFATE 0.15 MG: 1 INJECTION, SOLUTION EPIDURAL; INTRATHECAL; INTRAVENOUS at 07:57

## 2021-12-02 RX ADMIN — SODIUM CHLORIDE, POTASSIUM CHLORIDE, SODIUM LACTATE AND CALCIUM CHLORIDE: 600; 310; 30; 20 INJECTION, SOLUTION INTRAVENOUS at 09:16

## 2021-12-02 RX ADMIN — FENTANYL CITRATE 40 MCG: 50 INJECTION, SOLUTION INTRAMUSCULAR; INTRAVENOUS at 09:51

## 2021-12-02 RX ADMIN — ONDANSETRON 4 MG: 2 INJECTION INTRAMUSCULAR; INTRAVENOUS at 11:38

## 2021-12-02 RX ADMIN — ACETAMINOPHEN 975 MG: 325 TABLET, FILM COATED ORAL at 14:14

## 2021-12-02 RX ADMIN — BUPIVACAINE HYDROCHLORIDE 20 ML: 2.5 INJECTION, SOLUTION EPIDURAL; INFILTRATION; INTRACAUDAL at 11:05

## 2021-12-02 RX ADMIN — KETOROLAC TROMETHAMINE 30 MG: 30 INJECTION, SOLUTION INTRAMUSCULAR; INTRAVENOUS at 22:19

## 2021-12-02 RX ADMIN — BUPIVACAINE 20 ML: 13.3 INJECTION, SUSPENSION, LIPOSOMAL INFILTRATION at 07:58

## 2021-12-02 RX ADMIN — OXYTOCIN-SODIUM CHLORIDE 0.9% IV SOLN 30 UNIT/500ML 300 ML/HR: 30-0.9/5 SOLUTION at 09:47

## 2021-12-02 RX ADMIN — BUPIVACAINE HYDROCHLORIDE IN DEXTROSE 1.6 ML: 7.5 INJECTION, SOLUTION SUBARACHNOID at 08:59

## 2021-12-02 RX ADMIN — BUPIVACAINE HYDROCHLORIDE 1.6 ML: 7.5 INJECTION, SOLUTION INTRASPINAL at 07:57

## 2021-12-02 RX ADMIN — NALBUPHINE HYDROCHLORIDE 2.5 MG: 10 INJECTION, SOLUTION INTRAMUSCULAR; INTRAVENOUS; SUBCUTANEOUS at 13:13

## 2021-12-02 RX ADMIN — NALBUPHINE HYDROCHLORIDE 2.5 MG: 10 INJECTION, SOLUTION INTRAMUSCULAR; INTRAVENOUS; SUBCUTANEOUS at 12:32

## 2021-12-02 RX ADMIN — Medication 2 G: at 09:12

## 2021-12-02 RX ADMIN — SODIUM CHLORIDE, SODIUM LACTATE, POTASSIUM CHLORIDE, CALCIUM CHLORIDE AND DEXTROSE MONOHYDRATE: 5; 600; 310; 30; 20 INJECTION, SOLUTION INTRAVENOUS at 18:28

## 2021-12-02 RX ADMIN — ACETAMINOPHEN 975 MG: 325 TABLET, FILM COATED ORAL at 20:26

## 2021-12-02 ASSESSMENT — ACTIVITIES OF DAILY LIVING (ADL)
TOILETING_ISSUES: NO
FALL_HISTORY_WITHIN_LAST_SIX_MONTHS: NO

## 2021-12-02 NOTE — OR NURSING
Data: Pt to OB PACU at 1110 via cart. PIV infusing without complications, guy with clear urine to gravity, pt denies any pain, denies any nausea and vomiting.  Interventions: IV to pump, monitors and alarms on, SCD on.  Response: stable.  Plan: Patient instructed to notify RN for pain or nausea, routine post op cares, initiate breastfeeding/pumping as soon as patient/infant able.

## 2021-12-02 NOTE — DISCHARGE SUMMARY
[facility] Discharge Summary    Thea Rodriguez MRN# [id number]   Age: 36 year old YOB: 1985     Date of Admission:  2021  Date of Discharge:  2021  Admitting Physician:  Rajni Meraz MD  Discharge Physician:  Flavia Marcano MD             Admission Diagnoses:    at 39w0d  H/o C/S x2  Child with Trisomy 21  AMA          Discharge Diagnosis:     Same, now , delivered           Procedures:     Procedure(s): Repeat low transverse  section with double layer closure via Pfannenstiel skin incision  Spinal anesthesia                Medications Prior to Admission:     Medications Prior to Admission   Medication Sig Dispense Refill Last Dose     acetaminophen (TYLENOL) 325 MG tablet Take 2 tablets (650 mg) by mouth every 6 hours as needed for mild pain Start after Delivery. 100 tablet 0 Unknown at Unknown time     ibuprofen (ADVIL/MOTRIN) 600 MG tablet Take 1 tablet (600 mg) by mouth every 6 hours as needed for moderate pain Start after delivery 60 tablet 0 Unknown at Unknown time     Prenatal Vit-Fe Fumarate-FA (PRENATAL MULTIVITAMIN W/IRON) 27-0.8 MG tablet Take 1 tablet by mouth daily 90 tablet 3 2021 at Unknown time     vitamin D3 (CHOLECALCIFEROL) 50 mcg (2000 units) tablet Take 1 tablet (50 mcg) by mouth daily 100 tablet 10 2021 at Unknown time     omeprazole (PRILOSEC) 20 MG DR capsule Take 1 capsule (20 mg) by mouth daily (Patient not taking: Reported on 2021) 30 capsule 3      senna-docusate (SENOKOT-S/PERICOLACE) 8.6-50 MG tablet Take 1 tablet by mouth daily Start after delivery. (Patient not taking: Reported on 2021) 100 tablet 0              Discharge Medications:        Review of your medicines      CONTINUE these medicines which have NOT CHANGED      Dose / Directions   acetaminophen 325 MG tablet  Commonly known as: TYLENOL  Used for: High-risk pregnancy, elderly multigravida, unspecified trimester      Dose: 650 mg  Take 2  tablets (650 mg) by mouth every 6 hours as needed for mild pain Start after Delivery.  Quantity: 100 tablet  Refills: 0     ibuprofen 600 MG tablet  Commonly known as: ADVIL/MOTRIN  Used for: High-risk pregnancy, elderly multigravida, unspecified trimester      Dose: 600 mg  Take 1 tablet (600 mg) by mouth every 6 hours as needed for moderate pain Start after delivery  Quantity: 60 tablet  Refills: 0     omeprazole 20 MG DR capsule  Commonly known as: priLOSEC  Used for: Acute gastritis without hemorrhage, unspecified gastritis type      Dose: 20 mg  Take 1 capsule (20 mg) by mouth daily  Quantity: 30 capsule  Refills: 3     prenatal multivitamin w/iron 27-0.8 MG tablet  Used for: Supervision of high-risk pregnancy of elderly multigravida      Dose: 1 tablet  Take 1 tablet by mouth daily  Quantity: 90 tablet  Refills: 3     senna-docusate 8.6-50 MG tablet  Commonly known as: SENOKOT-S/PERICOLACE  Used for: High-risk pregnancy, elderly multigravida, unspecified trimester      Dose: 1 tablet  Take 1 tablet by mouth daily Start after delivery.  Quantity: 100 tablet  Refills: 0     vitamin D3 50 mcg (2000 units) tablet  Commonly known as: CHOLECALCIFEROL  Used for: High-risk pregnancy, elderly multigravida, unspecified trimester      Dose: 1 tablet  Take 1 tablet (50 mcg) by mouth daily  Quantity: 100 tablet  Refills: 10                  Consultations:   Anesthesia          Brief Admission History:   Ms. Thea Rodriguez is a 36 year old now  who initially presented at 38w6d for scheduled repeat .       Intraoperative course   The procedure was uncomplicated.   mL.  See operative report for details.     Findings:  - A single vigorous, liveborn male weighing 0946 with apgars of 8 and 9.  - Normal appearing uterus with small fibroids anteriorly, fallopian tubes, ovaries.    - No nuchal cord. Clear amniotic fluid.   - Minimal intraabdominal filmy adhesions from bladder to rectus muscle, easily taken  down. Moderate recto-fascial adhesions.        Postpartum Course   The patient's hospital course was unremarkable.  She recovered as anticipated and experienced no post-operative complications. On discharge, her pain was well controlled. Vaginal bleeding is similar to peak menstrual flow.  Voiding without difficulty.  Ambulating well and tolerating a normal diet.  No fever or significant wound drainage. Her EDS was 12 per nursing, at discharge patient reports her mood is good. Breastfeeding well.  Infant is stable.  No bowel movement yet.  She was discharged on post-partum day #2.    Post-partum hemoglobin:   Hemoglobin   Date Value Ref Range Status   12/03/2021 11.3 (L) 11.7 - 15.7 g/dL Final   03/03/2021 13.5 11.7 - 15.7 g/dL Final             Discharge Instructions and Follow-Up:     Discharge diet: Regular   Discharge activity: No lifting greater than 20 lbs, pushing, pulling, or other strenuous activity for 6 weeks. Pelvic rest for 6 weeks including no sexual intercourse, tampons, or douching. No driving until you can slam on the brakes without pain or while on narcotic pain medications.    Discharge follow-up: Follow up with primary OB for routine postpartum visit in 6 weeks  Follow up for mood check in 1 week.   Wound care: Keep incision clean and dry           Discharge Disposition:     Discharged to home in stable condition      Flavia Leger MD  OB/GYN Resident, PGY-1       Physician Attestation   I, Flavia Marcano, saw and evaluated this patient prior to discharge.  I discussed the patient with the resident/fellow and agree with plan of care as documented in the note.      I personally reviewed vital signs, medications, labs and exam.    I personally spent 20 minutes on discharge activities.    Flavia Marcano MD  Date of Service (when I saw the patient): 12/04/21

## 2021-12-02 NOTE — H&P
[facility]  OB History and Physical      Thea Barros MRN# [id number]   Age: 36 year old YOB: 1985     CC:  RLTCS    HPI:  Thea Barros is a 36 year old  at 39w0d by LMP c/w early US, who presents for scheduled RLTCS.  She denies regular contractions, vaginal bleeding, and loss of fluid. Reports normal fetal movement.    She has a history of postpartum hemorrhage with her first  section which required 2u PRBCs. Denies history of high blood pressures, asthma. She has no other prior abdominal surgeries besides CS x2.    ROS:   Complete 10-point ROS negative except as noted in HPI.She denies headache, blurry vision, chest pain, shortness of breath, RUQ pain.    Pregnancy Complications:  -  H/o C/S x2  - Child with Trisomy 21  - H/o PPH requiring 2u PRBCs  - AMA    Prenatal Labs:   Lab Results   Component Value Date    ABO O 02/10/2020    RH Pos 02/10/2020    AS Negative 2021    HEPBANG Nonreactive 10/06/2021    HGB 12.6 2021       GBS Status:   Lab Results   Component Value Date    GBS Negative 2020       OB History  OB History    Para Term  AB Living   4 2 2 0 1 2   SAB IAB Ectopic Multiple Live Births   1 0 0 0 2      # Outcome Date GA Lbr Kranthi/2nd Weight Sex Delivery Anes PTL Lv   4 Current            3 Term 20 39w4d  3.66 kg (8 lb 1.1 oz) F CS-LTranv Spinal N PACO      Name: PERLITA BARROS-THEA      Apgar1: 9  Apgar5: 9   2 Term 2017   3.402 kg (7 lb 8 oz)  CS-Unspec   PACO      Name: Kidus   1 SAB               Obstetric Comments   Son with Down's syndrome       PMHx:   Past Medical History:   Diagnosis Date     Down syndrome in child of prior pregnancy, currently pregnant 9/10/2019     PSHx:   Past Surgical History:   Procedure Laterality Date      SECTION        SECTION N/A 2020    Procedure:  SECTION;  Surgeon: Rajni Meraz MD;  Location:  L+D     Meds  Medications Prior to  Admission   Medication Sig Dispense Refill Last Dose     acetaminophen (TYLENOL) 325 MG tablet Take 2 tablets (650 mg) by mouth every 6 hours as needed for mild pain Start after Delivery. 100 tablet 0 Unknown at Unknown time     ibuprofen (ADVIL/MOTRIN) 600 MG tablet Take 1 tablet (600 mg) by mouth every 6 hours as needed for moderate pain Start after delivery 60 tablet 0 Unknown at Unknown time     Prenatal Vit-Fe Fumarate-FA (PRENATAL MULTIVITAMIN W/IRON) 27-0.8 MG tablet Take 1 tablet by mouth daily 90 tablet 3 12/1/2021 at Unknown time     vitamin D3 (CHOLECALCIFEROL) 50 mcg (2000 units) tablet Take 1 tablet (50 mcg) by mouth daily 100 tablet 10 12/1/2021 at Unknown time     omeprazole (PRILOSEC) 20 MG DR capsule Take 1 capsule (20 mg) by mouth daily (Patient not taking: Reported on 11/17/2021) 30 capsule 3      senna-docusate (SENOKOT-S/PERICOLACE) 8.6-50 MG tablet Take 1 tablet by mouth daily Start after delivery. (Patient not taking: Reported on 11/24/2021) 100 tablet 0        Allergies:  No Known Allergies   FmHx:   Family History   Problem Relation Age of Onset     Diabetes Mother 50     No Known Problems Father      No Known Problems Brother      No Known Problems Sister      No Known Problems Brother      No Known Problems Brother      No Known Problems Brother      No Known Problems Sister      Breast Cancer No family hx of      Heart Disease No family hx of      SocHx:  She denies any tobacco, alcohol, or other drug use during this pregnancy.    PE:  Vit:   Patient Vitals for the past 4 hrs:   BP Temp Temp src Resp   12/02/21 0659 100/58 98.6  F (37  C) Oral 16      Gen: Well-appearing, NAD, comfortable  CV: Well perfused, regular rate  Pulm: Breathing comfortably  Abd: Soft, gravid, non-tender  Ext: No LE edema b/l    EFW:  8lbs by Leopold's  Memb: intact              FHT: Baseline 145, mod variability, accelerations present, no decelerations   [place]: 0 contractions in 10 minutes       Assessment/Plan:  Thea Rodriguez is a 36 year old , at 39w0d by LMP c/w early US, who presents for RLTCS.    Scheduled RLTCS  - Discussed expectations for surgery and recovery, including surgical risks of bleeding; need for transfusion; infection; injury to uterus, fallopian tubes, ovaries, bowel, bladder, nerves, blood vessels, ureters, or baby. Additionally discussed remote risk of hysterectomy in the case of uncontrollable bleeding. Patient agreeable to proceed with surgery, written informed consent signed.  - Admit to L&D for scheduled RLTCS  - CBC, type and screen, RPR pending  - Preoperative antibiotics: 2g Ancef  - Plan for spinal anesthesia  - Landa prior to procedure  - SCDs for DVT prophylaxis    H/o Postpartum Hemorrhage  - Patient typed and crossed for 2u PRBCs    The patient was discussed with Dr. Meraz who is in agreement with the treatment plan.    Lexi Black MD  Obstetrics & Gynecology, PGY-2  2021 8:36 AM     Physician Attestation   I, Rajni Meraz MD, personally examined and evaluated this patient.  I discussed the patient with the resident and care team, and agree with the assessment and plan of care as documented in the note above.   I personally reviewed vital signs, medications, labs, fetal heart tones and toco.  Key findings: Patient is here for a scheduled repeat  section.  She does not want a PPTL. Fetal status is reassuring with a reactive NST.  No contractions noted.  Labs as noted above. Procedure d/w patient and  and consent obtained. Given h/o PPH, will have 2 units available, but will be prepared with uterotonics in the room.     Rajni Meraz MD   2021

## 2021-12-02 NOTE — PLAN OF CARE
[id number]: The patient has been stable. Her blood pressures are 90s/upper 50s-60s. She has been resting and breastfeeding baby on demand. IV is infusing with D5LR at 125 mL/hr. Output is adequate. She got out of bed to ambulate but was feeling dizzy so she sat on the edge of the bed. Her guy was left in because of that, and the RN who transferred this patient said Dr. Meraz said she wants her guy left in until the patient is up and ambulating. She is taking toradol and tylenol and is denying pain. Continue to monitor and support as needed.

## 2021-12-02 NOTE — PLAN OF CARE
Scheduled AM C/S Admit Note  Thea Rodriguez  MRN: [id number]  Gestational Age: 39w0d      Thea Rodriguez presents for scheduled  section for repeat.  Patient denies contractions, bleeding or LOF.  NPO [id number].    Past Medical History:   Diagnosis Date    Down syndrome in child of prior pregnancy, currently pregnant 9/10/2019     Past Surgical History:   Procedure Laterality Date     SECTION       SECTION N/A 2020    Procedure:  SECTION;  Surgeon: Rajni Meraz MD;  Location:  L+D       Dr. Meraz was notified of patient's arrival and condition.    FHT: 145  NST: Reactive .    Plan:  - section at 0830.

## 2021-12-02 NOTE — PLAN OF CARE
Data: Thea Rodriguez transferred to [facility] via wheelchair at 1325. Baby transferred via parent's arms.  Action: Receiving unit notified of transfer: Yes. Patient and family notified of room change. Report given to Julianne VINSON Rn at 1345. Belongings sent to receiving unit. Accompanied by Registered Nurse. Oriented patient to surroundings. Call light within reach. ID bands double-checked with receiving RN.  Response: Patient tolerated transfer and is stable.

## 2021-12-02 NOTE — ANESTHESIA PROCEDURE NOTES
TAP Procedure Note    Pre-Procedure   Staff -        Anesthesiologist:  Laina Farley MD       Resident/Fellow: Cassandra Campbell MD       Performed By: resident       Location: pre-op       Pre-Anesthestic Checklist: patient identified, IV checked, site marked, risks and benefits discussed, informed consent, monitors and equipment checked, pre-op evaluation, at physician/surgeon's request and post-op pain management  Timeout:       Correct Patient: Yes        Correct Procedure: Yes        Correct Site: Yes        Correct Position: Yes        Correct Laterality: Yes        Site Marked: Yes  Procedure Documentation  Procedure: TAP       Diagnosis: POST OPERATIVE PAIN       Laterality: bilateral       Patient Position: supine       Patient Prep/Sterile Barriers: sterile gloves, mask       Skin prep: Chloraprep       Needle Type: short bevel       Needle Gauge: 21.        Needle Length (millimeters): 110        Ultrasound guided       1. Ultrasound was used to identify targeted nerve, plexus, vascular marker, or fascial plane and place a needle adjacent to it in real-time.       2. Ultrasound was used to visualize the spread of anesthetic in close proximity to the above referenced structure.       3. A permanent image is entered into the patient's record.    Assessment/Narrative         The placement was negative for: blood aspirated, painful injection and site bleeding       Paresthesias: No.     Bolus given via needle..        Secured via.        Insertion/Infusion Method: Single Shot       Complications: none       Injection made incrementally with aspirations every 5 mL.    Medication(s) Administered   Bupivacaine 0.25% PF (Infiltration), 20 mL  Bupivacaine liposome (Exparel) 1.3% LA inj susp (Infiltration), 20 mL

## 2021-12-02 NOTE — PROGRESS NOTES
"Clinical Nutrition Services Brief Note - MST score >/= 2     Thea Rodriguez is a 36 year old female screened by the dietitian d/t MST score of 2 d/t \"unsure\" of possible weight loss. Pt underwent  this morning--did not speak with patient     Per documented weight history, patient's weight up and down--of note, patient gave birth in early  and is now full term. Pt initiated her prenatal care while in [place]--weight records from earlier in pregnancy not available. Assume pre-gravid weight of 150 lb based on weight from 3/3/21--pt has gained about 13 lbs if current weight is accurate (>7 days old), weight gain recommendations based on pre-gravid BMI of 28 are 15-25 lbs.  Wt Readings from Last 10 Encounters:   21 : 73.9 kg (163 lb)   21 : 72.1 kg (159 lb)   21 : 72.6 kg (160 lb)   10/14/21 : 70.9 kg (156 lb 3.2 oz)   21 : 68.4 kg (150 lb 12.8 oz)   20 : 70.3 kg (155 lb)   20 : 77.6 kg (171 lb)   20 : 77.7 kg (171 lb 3.2 oz)   20 : 77.7 kg (171 lb 6.4 oz)   20 : 76.7 kg (169 lb)     Due to no weight loss and intake likely to be adequate, RD to sign off at this time. RD available by consult if further nutrition intervention warranted prior to discharge.    Octavia Dooley RD, LD  ICU/5A/OB/Mental Health Pager (M-F): [phone]  On Call Pager (weekends only): [phone]         "

## 2021-12-02 NOTE — ANESTHESIA POSTPROCEDURE EVALUATION
Patient: Thea Rodriguez    Procedure: Procedure(s):   SECTION       Diagnosis:Previous  delivery, antepartum condition or complication [O34.219]  Diagnosis Additional Information: No value filed.    Anesthesia Type:  Spinal    Note:  Disposition: Admission; Inpatient   Postop Pain Control: Uneventful            Sign Out: Well controlled pain   PONV: No   Neuro/Psych: Uneventful            Sign Out: Acceptable/Baseline neuro status   Airway/Respiratory: Uneventful            Sign Out: Acceptable/Baseline resp. status   CV/Hemodynamics: Uneventful            Sign Out: Acceptable CV status; No obvious hypovolemia; No obvious fluid overload   Other NRE: NONE   DID A NON-ROUTINE EVENT OCCUR? No           Last vitals:  Vitals Value Taken Time   /67 21 1130   Temp     Pulse 91 21 1137   Resp 14 21 1115   SpO2 98 % 21 1137   Vitals shown include unvalidated device data.    Electronically Signed By: Laina Farley MD  2021  11:39 AM

## 2021-12-02 NOTE — ANESTHESIA PREPROCEDURE EVALUATION
Anesthesia Pre-Procedure Evaluation    Patient: Thea Rodriguez   MRN: [id number] : 1985        Preoperative Diagnosis: Previous  delivery, antepartum condition or complication [O34.219]    Procedure : Procedure(s):   SECTION          Past Medical History:   Diagnosis Date     Down syndrome in child of prior pregnancy, currently pregnant 9/10/2019      Past Surgical History:   Procedure Laterality Date      SECTION        SECTION N/A 2020    Procedure:  SECTION;  Surgeon: Rajni Meraz MD;  Location: [facility] L+D      No Known Allergies   Social History     Tobacco Use     Smoking status: Never Smoker     Smokeless tobacco: Never Used   Substance Use Topics     Alcohol use: No      Wt Readings from Last 1 Encounters:   21 73.9 kg (163 lb)        Anesthesia Evaluation   Pt has had prior anesthetic. Type: Regional.    No history of anesthetic complications       ROS/MED HX  ENT/Pulmonary:  - neg pulmonary ROS     Neurologic:  - neg neurologic ROS     Cardiovascular:  - neg cardiovascular ROS     METS/Exercise Tolerance: >4 METS    Hematologic:  - neg hematologic  ROS     Musculoskeletal:  - neg musculoskeletal ROS     GI/Hepatic:  - neg GI/hepatic ROS     Renal/Genitourinary:  - neg Renal ROS     Endo:  - neg endo ROS     Psychiatric/Substance Use:  - neg psychiatric ROS     Infectious Disease:  - neg infectious disease ROS     Malignancy:  - neg malignancy ROS     Other:  - neg other ROS          Physical Exam    Airway        Mallampati: II   TM distance: > 3 FB   Neck ROM: full   Mouth opening: > 3 cm    Respiratory Devices and Support         Dental  no notable dental history         Cardiovascular   cardiovascular exam normal          Pulmonary   pulmonary exam normal                OUTSIDE LABS:  CBC:   Lab Results   Component Value Date    WBC 6.2 2021    WBC 6.5 10/28/2021    HGB 12.6 2021    HGB 12.3 2021    HCT 39.4 2021     HCT 37.1 10/28/2021     2021     10/28/2021     BMP:   Lab Results   Component Value Date     10/28/2021    POTASSIUM 3.9 10/28/2021    CHLORIDE 112 (H) 10/28/2021    CO2 24 10/28/2021    BUN 8 10/28/2021    CR 0.57 10/28/2021     (H) 10/28/2021    GLC 93 2021     COAGS: No results found for: PTT, INR, FIBR  POC:   Lab Results   Component Value Date    HCG Positive (A) 2019     HEPATIC:   Lab Results   Component Value Date    ALBUMIN 2.5 (L) 10/28/2021    PROTTOTAL 6.4 (L) 10/28/2021    ALT 15 10/28/2021    AST 13 10/28/2021    ALKPHOS 66 10/28/2021    BILITOTAL 0.2 10/28/2021     OTHER:   Lab Results   Component Value Date    A1C 5.6 10/06/2021    CHICHI 8.6 10/28/2021    TSH 2.76 2021       Anesthesia Plan    ASA Status:  2      Anesthesia Type: Spinal.              Consents         - Extended Intubation/Ventilatory Support Discussed: No.      - Patient is DNR/DNI Status: No         Postoperative Care    Pain management: IV analgesics.   PONV prophylaxis: Ondansetron (or other 5HT-3)     Comments:    Other Comments: 35 yo  for repeat CS. plt 134, Hgb 12.6. Prior CS with spinal            Cassandra Campbell MD

## 2021-12-02 NOTE — ANESTHESIA CARE TRANSFER NOTE
Patient: Thea Rodriguez    Procedure: Procedure(s):   SECTION       Diagnosis: Previous  delivery, antepartum condition or complication [O34.219]  Diagnosis Additional Information: No value filed.    Anesthesia Type:   Spinal     Note:    Oropharynx: oropharynx clear of all foreign objects and spontaneously breathing  Level of Consciousness: awake  Oxygen Supplementation: room air    Independent Airway: airway patency satisfactory and stable  Dentition: dentition unchanged  Vital Signs Stable: post-procedure vital signs reviewed and stable  Report to RN Given: handoff report given  Patient transferred to: PACU    Handoff Report: Identifed the Patient, Identified the Reponsible Provider, Reviewed the pertinent medical history, Discussed the surgical course, Reviewed Intra-OP anesthesia mangement and issues during anesthesia, Set expectations for post-procedure period and Allowed opportunity for questions and acknowledgement of understanding      Vitals:  Vitals Value Taken Time   /71 21 1111   Temp     Pulse 66 21 1111   Resp     SpO2 98 % 21 1115   Vitals shown include unvalidated device data.    Electronically Signed By: Cassandra Campbell MD  2021  11:16 AM

## 2021-12-02 NOTE — BRIEF OP NOTE
Brief Operative Note   Name: Thea Rodriguez  MRN: [id number]  : 1985  Date of Surgery: 2021    Pre-operative Diagnosis:    at 39w0d  H/o C/S x2  AMA  Post-operative Diagnosis:   Same, now   Procedure(s): Repeat low transverse  section with double layer uterine closure via Pfannenstiel skin incision    Surgeon:  Rajni Meraz MD   Assistants:  Lexi Black MD, PGY-2  Brent Childress MS3    Anesthesia: Spinal  QBL: 612 mL   Urine Output: 300 mL clear urine   Fluids: 1400 mL crystalloid  Medications: Ancef 2g, Pitocin 30U    Specimens: None  Complications: None apparent.  Findings:  - A single vigorous, liveborn male weighing 0946 with apgars of 8 and 9.  - Normal appearing uterus with small fibroids anteriorly, fallopian tubes, ovaries.    - No nuchal cord. Clear amniotic fluid.   - Minimal intraabdominal filmy adhesions from bladder to rectus muscle, easily taken down. Moderate recto-fascial adhesions.    Lexi Black MD  Obstetrics & Gynecology, PGY-2  2021 11:37 AM

## 2021-12-02 NOTE — PROGRESS NOTES
Transfer Note:    Pt transferred to [facility] from L&D around 1330 via cart. Baby transferred in father's arms. ID bands were checked with labor RN Mari and Postpartum RN Janeth. Pitocin infusing and PCDs on. Landa in place and patent. Pt and  were oriented to the room and call light is within reach.

## 2021-12-02 NOTE — ANESTHESIA PROCEDURE NOTES
Intrathecal catheter Procedure Note    Pre-Procedure   Staff -        Anesthesiologist:  Laina Farley MD       Resident/Fellow: Cassandra Campbell MD       Performed By: resident       Pre-Anesthestic Checklist: patient identified, IV checked, risks and benefits discussed, informed consent, monitors and equipment checked, pre-op evaluation, at physician/surgeon's request and post-op pain management  Timeout:       Correct Patient: Yes        Correct Procedure: Yes        Correct Site: Yes        Correct Position: Yes   Procedure Documentation  Procedure: intrathecal catheter       Patient Position: sitting       Patient Prep/Sterile Barriers: sterile gloves, mask, patient draped       Skin prep: Chloraprep       Insertion Site: L4-5. (midline approach).       Needle Gauge: 25.        Needle Length (Inches): 3.5        Spinal Needle Type: Pencan       Introducer used       # of attempts: 2 and  # of redirects:  2    Assessment/Narrative         Paresthesias: Resolved.       Sensory Level: T4       CSF fluid: clear.      Opening pressure was cmH2O while  Sitting.      Medication(s) Administered   0.75% Hyperbaric Bupivacaine (Intrathecal), 1.6 mL  Morphine PF 1 mg/mL (Intrathecal), 0.15 mg  Fentanyl PF (Intrathecal), 10 mcg

## 2021-12-02 NOTE — OP NOTE
[facility]  Full Operative Progress Note     Surgery Date:  2021    Surgeon:  Rajni Meraz MD    Assistants:  Lexi Black MD, PGY-2    Pre-op Diagnosis:     at 39w0d  H/o C/S x2  AMA    Post-op Diagnosis:    Same, now   Liveborn male infant   Moderate recto-fascial adhesions    Procedure:  Repeat low-transverse  section with double layer uterine closure via Pfannenstiel skin incision. Lysis of adhesions    Anesthesia: Spinal, TAP block    Medications: Ancef 2g, 30U Pitocin    QBL:  612 mL    IVF:  1400 mL crystalloid    UOP:  300 mL clear urine at the end of the case    Drains: Landa Catheter     Specimens:  None    Complications: None apparent    Indications:   Thea Rodriguez is a 36 year old year old  at 38w6d who presents for scheduled RLTCS.  The risks, benefits, and alternatives of  section were discussed with the patient, and she agreed to proceed.     Findings:   - A single vigorous, liveborn male weighing 2920g with apgars of 8 and 9. Cephalic presentation.   - Normal appearing uterus with small fibroids anteriorly, fallopian tubes, ovaries.    - No nuchal cord. Clear amniotic fluid.   - Minimal intraabdominal filmy adhesions from bladder to rectus muscle, easily taken down. Moderate recto-fascial adhesions.    Procedure Details:   The patient was brought to the OR, where adequate spinal anesthesia was administered.  She was placed in the dorsal supine position with a slight leftward tilt. She was prepped and draped in the usual sterile fashion. A surgical time out was performed. A pfannenstiel skin incision was made with the scalpel, and carried down to the underlying fascia with sharp and blunt dissection as well as electrocautery. The fascia was incised in the midline, and the incision was extended laterally with the Miner scissors. The superior aspect of the fascia was grasped with the Kocher clamps and dissected off of  the underlying rectus muscles with blunt and sharp dissection and electrocautery. Attention was then turned to the inferior aspect of the fascia, which was similarly dissected off of the underlying rectus muscles. The rectus muscles were already  in the midline open to the peritoneum. The bladder was noted to be adhered to the inferior portion of the rectus and the adhesions were taken down. Tthe opening was extended with digital pressure and electrocautery. The bladder blade was placed. The vesicouterine peritoneum was incised in the midline, and the incision was extended laterally with the Metzenbaum scissors. A bladder flap was created digitally and the bladder blade was replaced. A transverse hysterotomy was made with the scalpel in the lower uterine segment, and the incision was extended with digital pressure. The infant was noted to be in the ANNI position, and was delivered atraumatically. The shoulders delivered easily.  No nuchal cord was noted. The cord was doubly clamped and cut, and the infant was handed off to the awaiting nursery staff. A segment of cord was cut and discarded. The placenta was delivered with gentle traction on the umbilical cord and uterine massage. The uterus was exteriorized and cleared of all clots and debris. Uterine tone was noted to be firm with 30 units of pitocin given through the running IV and uterine massage.  The hysterotomy was closed with a running locked suture of 0 Monocryl.  The hysterotomy was then imbricated using an 0 Monocryl suture. The hysterotomy was noted to be hemostatic. The posterior cul-de-sac was cleared of all clots and debris. The uterus was returned to the abdomen. The pericolic gutters were cleared of all clots and debris. The hysterotomy was reexamined and noted to be hemostatic. The fascia and rectus muscles were examined and areas of oozing were controlled with electrocautery. Interceed was applied over the rectus muscle. The fascia was  closed with a running 0 Vicryl suture. The subcutaneous tissue was irrigated and areas of oozing were controlled with electrocautery. The subcutaneous tissue was greater than 2 cm in thickness, and was therefore 2-0 Plain Gut closed. The skin was closed with 4-0 Monocryl and covered with a sterile dressing.    All sponge, needle, and instrument counts were correct. The patient tolerated the procedure well, and was transferred to recovery in stable condition. Dr. Meraz was present and scrubbed for the entirety of the procedure.     Lexi Black MD  Obstetrics & Gynecology, PGY-2  12/02/2021 4:47 PM     Physician Attestation   I was present for the entire procedure between opening and closing. I have reviewed and edited the above operative report to reflect the exact findings and details of the surgical procedure.    Rajni Meraz MD   December 2, 2021

## 2021-12-03 LAB — HGB BLD-MCNC: 11.3 G/DL (ref 11.7–15.7)

## 2021-12-03 PROCEDURE — 85018 HEMOGLOBIN: CPT | Performed by: STUDENT IN AN ORGANIZED HEALTH CARE EDUCATION/TRAINING PROGRAM

## 2021-12-03 PROCEDURE — 250N000013 HC RX MED GY IP 250 OP 250 PS 637: Performed by: STUDENT IN AN ORGANIZED HEALTH CARE EDUCATION/TRAINING PROGRAM

## 2021-12-03 PROCEDURE — 36415 COLL VENOUS BLD VENIPUNCTURE: CPT | Performed by: STUDENT IN AN ORGANIZED HEALTH CARE EDUCATION/TRAINING PROGRAM

## 2021-12-03 PROCEDURE — 250N000011 HC RX IP 250 OP 636: Performed by: STUDENT IN AN ORGANIZED HEALTH CARE EDUCATION/TRAINING PROGRAM

## 2021-12-03 PROCEDURE — 120N000002 HC R&B MED SURG/OB UMMC

## 2021-12-03 RX ADMIN — DOCUSATE SODIUM AND SENNOSIDES 2 TABLET: 8.6; 5 TABLET ORAL at 22:23

## 2021-12-03 RX ADMIN — ACETAMINOPHEN 975 MG: 325 TABLET, FILM COATED ORAL at 08:00

## 2021-12-03 RX ADMIN — ACETAMINOPHEN 975 MG: 325 TABLET, FILM COATED ORAL at 22:23

## 2021-12-03 RX ADMIN — IBUPROFEN 800 MG: 800 TABLET ORAL at 11:12

## 2021-12-03 RX ADMIN — KETOROLAC TROMETHAMINE 30 MG: 30 INJECTION, SOLUTION INTRAMUSCULAR; INTRAVENOUS at 04:30

## 2021-12-03 RX ADMIN — DOCUSATE SODIUM AND SENNOSIDES 1 TABLET: 8.6; 5 TABLET ORAL at 08:01

## 2021-12-03 RX ADMIN — IBUPROFEN 800 MG: 800 TABLET ORAL at 22:23

## 2021-12-03 RX ADMIN — ACETAMINOPHEN 975 MG: 325 TABLET, FILM COATED ORAL at 02:17

## 2021-12-03 RX ADMIN — ACETAMINOPHEN 975 MG: 325 TABLET, FILM COATED ORAL at 16:17

## 2021-12-03 NOTE — PLAN OF CARE
Pt's blood pressure are slightly on the low side. Postpartum checks within normal limits. Pt is eating and drinking. Ambulated with assist to the bathroom with no difficulties. The guy is discontinue and pt has voided twice with no problem. Complains of incisional pain. Pt is breastfeeding well with minimal assist. Notified the resident about the low blood pressures. Medicated pt with Toradol and Tylenol for pain control. Assisted pt with positioning and latching baby. Encouraged pt to empty bladder every 2-3 hours. Continue PP cares and assist with breastfeeding as needed.

## 2021-12-03 NOTE — PROGRESS NOTES
Post Partum Progress Note  POD#1  Subjective:  She is resting comfortably in bed this morning, breastfeeding. Pain only at incision site but feels it's adequately controlled.  Lochia present and volume is less than a period. Tolerating PO intake. Has been drinking large volumes of water. Describes some difficulty voiding where she has the urge to urinate a large volume but only voids a small amount. Denies sensation of residual volume post voiding. Passing flatus. No BM yet. Ambulating without dizziness or difficulty. Declines birth control but says maybe another time. No other questions or concerns today.    Objective:  Vitals:    21 1500 21 1600 21 1700 / 1800   BP: 92/64 97/57 99/63 97/62   Pulse: 63 71 76 60   Resp:  16 16 16   Temp:  98  F (36.7  C) 97.9  F (36.6  C) 97.8  F (36.6  C)   TempSrc:  Oral Oral Oral   SpO2: 99% 100% 100% 100%       General: NAD. Sitting up in bed breastfeeding infant  CV: Well-perfused  Pulm: Normal respiratory effort.  Abd: Soft, non-tender, non-distended. Fundus is firm and below the umbilicus.  Incision: Dressing c/d/i.     UOP 3.67 ml/kg/hr  21 Weight: 73.9kg    Assessment/Plan:  Thea Rodriguez is a 36 year old  female who is POD#1  s/p repeat low-transverse  section. Pregnancy c/b AMA, hx of 2 prior C/S., h/o PPH, and child w/ Trisomy 21.    # Routine postpartum  - Encourage routine post-operative goals including ambulation and incentive spirometry  - PNC: Rh +. Rubella +. No intervention indicated.  - Pain: controlled on oral medications  - Heme: Hgb 12.6 >  mL > AM Hgb pending.  If <11 will discharge home with iron.   - Hypotensive overnight, asymptomatic. Similar to her baseline.  - GI: continue anti-emetics and stool softeners as needed.  - :  Voiding spontaneously   - Infant:  Stable in mother's arms  - Feeding: Breast  - BC: Declines at this time    Anticipate discharge to home on POD#2-3 when meeting postpartum  goals  Follow-up in clinic in 6 weeks for routine postpartum visit.    Tonia Mcclelland,MS3    I was present with the medical student who participated in the service and in the documentation of this note.  I have verified the history and personally performed the physical exam and medical decision making, and have verified the content of the note, which accurately reflect my assessment of the patient and the plan of care.    Briana Garcia MD  OB/GYN, PGY-2  12/03/2021, 6:46 AM     Attestation:   This patient was seen and evaluated by me, separately from the house staff team. I have reviewed the note/plan above and agree.     Doing well and pain controlled. Discussed routine cares and discontinue IV today. Plan discharge home POD #2-3  Hemoglobin   Date Value Ref Range Status   12/03/2021 11.3 (L) 11.7 - 15.7 g/dL Final   03/03/2021 13.5 11.7 - 15.7 g/dL Final   ]  Joanne Dc MD

## 2021-12-03 NOTE — PROVIDER NOTIFICATION
12/03/21 0240   Provider Notification   Provider Name/Title TELLO Chacon     Continue to monitor per MD.

## 2021-12-03 NOTE — PLAN OF CARE
Data: VSS and postpartum checks WNL. Patient eating and drinking normally. Patient able to empty bladder independently and up ambulating. Patient performing self care and able to care for infant.Fundus at  1 cm below U and firm  without massage.  lochia scant and  no blood clots. Dressing clean,dry and intact. Will take shower later as per patient.   Action: Patient taking  Ibuprofen and tylenol .for pain and pain tolerable. Encouraged patient to breast   feed every 2 - 3 hours and to monitor for cues to feed infant. Patient education done( education record).   Response: Patient participating in infant's care. Positive attachment with infant observed. Support/ spouse present at bedside and attentive to infant and patient.   Plan: Continue with the plan of cares.

## 2021-12-03 NOTE — PROGRESS NOTES
Post  Anesthesia Follow Up Note    Patient: Thea Rodriguez    Patient location: Postpartum floor.    Chief complaint: Acute postoperative pain management s/p intrathecal morphine administration     Procedure(s) Performed:  Procedure(s):   SECTION    Anesthesia type: Spinal Block    Subjective:     Pain Control: 3/10 at rest and 5/10 with ambulation    Additional ROS:  She does not complain of pruritis at this time. She denies weakness, denies paresthesia, denies difficulties breathing or voiding, denies nausea or vomiting. She is able to ambulate and tolerates regular diet.    Objective:    Respiratory Function (RR / SpO2 / Airway Patency): Satisfactory    Cardiac Function (HR / Rhythm / BP): Satisfactory    Strength and sensation lower extremities: Normal    Site of spinal/epidural insertion: No signs of infection or inflammation.     Last Vitals: BP 92/63   Pulse 89   Temp 36.8  C (98.2  F) (Oral)   Resp 16   LMP 2021   SpO2 99%   Breastfeeding Unknown     Assessment and plan:   Thea Rodriguez is a 36 year old female  POD #1 s/p  C FULL ROUT OBSTE CARE, DELIV [93535] ( SECTION)  C  DELIVERY ONLY [86897]  C  DELIVERY+POSTPARTUM CARE [63283] with IT bupivacaine (1.6mg), fentanyl (10mcg) and morphine (150mcg); and single shot TAP nerve block injections with 20 mL bupivacaine 0.25% with epinephrine 1:200,000, then 20mL liposome bupivacaine (Exparel) long-acting 1.3% given in the PACU for postoperative analgesia.  Pt is ambulating without difficulty, no weakness or paresthesias.  There is no evidence of adverse side effects associated with spinal and nerve block injections.  The patient is receiving adequate incisional pain control at this time and anticipate up to 72 hours of incisional pain control.  However, we further anticipate that the patient will require opioid/nonopioid analgesics for visceral and muscle pain that is not controlled with  local anesthetic.      In brief summary, her post-operative analgesia is well controlled today. Further interventional analgesic strategies would be of little utility at this time. Thus, we recommend proceeding with PO analgesics including staggered dosing of NSAIDs (ibuprofen) and acetaminophen, with a taper of oxycodone.     Thank you for including us in the care for this patient.    Cassandra Campbell MD  Anesthesiology, CA-1

## 2021-12-04 VITALS
OXYGEN SATURATION: 99 % | DIASTOLIC BLOOD PRESSURE: 66 MMHG | HEART RATE: 66 BPM | SYSTOLIC BLOOD PRESSURE: 97 MMHG | TEMPERATURE: 97.8 F | RESPIRATION RATE: 16 BRPM

## 2021-12-04 PROCEDURE — 250N000013 HC RX MED GY IP 250 OP 250 PS 637: Performed by: STUDENT IN AN ORGANIZED HEALTH CARE EDUCATION/TRAINING PROGRAM

## 2021-12-04 RX ADMIN — IBUPROFEN 800 MG: 800 TABLET ORAL at 05:02

## 2021-12-04 RX ADMIN — ACETAMINOPHEN 975 MG: 325 TABLET, FILM COATED ORAL at 05:02

## 2021-12-04 RX ADMIN — DOCUSATE SODIUM AND SENNOSIDES 2 TABLET: 8.6; 5 TABLET ORAL at 07:45

## 2021-12-04 NOTE — DISCHARGE INSTRUCTIONS
Postop  Birth Instructions    Activity       Do not lift more than 10 pounds for 6 weeks after surgery.  Ask family and friends for help when you need it.    No driving until you have stopped taking your pain medications (usually two weeks after surgery).    No heavy exercise or activity for 6 weeks.  Don't do anything that will put a strain on your surgery site.    Don't strain when using the toilet.  Your care team may prescribe a stool softener if you have problems with your bowel movements.     To care for your incision:       Keep the incision clean and dry.    Do not soak your incision in water. No swimming or hot tubs until it has fully healed. You may soak in the bathtub if the water level is below your incision.    Do not use peroxide, gel, cream, lotion, or ointment on your incision.    Adjust your clothes to avoid pressure on your surgery site (check the elastic in your underwear for example).     You may see a small amount of clear or pink drainage and this is normal.  Check with your health care provider:       If the drainage increases or has an odor.    If the incision reddens, you have swelling, or develop a rash.    If you have increased pain and the medicine we prescribed doesn't help.    If you have a fever above 100.4 F (38 C) with or without chills when placing thermometer under your tongue.   The area around your incision (surgery wound), will feel numb.  This is normal. The numbness should go away in less than a year.     Keep your hands clean:  Always wash your hands before touching your incision (surgery wound). This helps reduce your risk of infection. If your hands aren't dirty, you may use an alcohol hand-rub to clean your hands. Keep your nails clean and short.    Call your healthcare provider if you have any of these symptoms:       You soak a sanitary pad with blood within 1 hour, or you see blood clots larger than a golf ball.    Bleeding that lasts more than 6  weeks.    Vaginal discharge that smells bad.    Severe pain, cramping or tenderness in your lower belly area.    A need to urinate more frequently (use the toilet more often), more urgently (use the toilet very quickly), or it burns when you urinate.    Nausea and vomiting.    Redness, swelling or pain around a vein in your leg.    Problems breastfeeding or a red or painful area on your breast.    Chest pain and cough or are gasping for air.    Problems with coping with sadness, anxiety or depression. If you have concerns about hurting yourself or the baby, call your provider immediately.      You have questions or concerns after you return home.                Discharge Instructions for  Section ()  You had a  section, or . During the , your baby was delivered through an incision in your stomach and uterus. Full recovery after a  can take time. It s important to take care of yourself -- for your own sake and because your new baby needs you. Here are some guidelines to follow at home.  Incision care  Here's how to take care of your incision:    Shower as needed. Pat your incision dry.    Watch your incision for signs of infection, like more redness or drainage.    Hold a pillow against the incision when you laugh or cough and when you get up from a lying or sitting position.    Remember, it can take as long as 6 weeks for your incision to heal.  Activity  Here are some suggestions:    Don t try to take care of anyone other than your baby and yourself.    Remember, the more active you are, the more likely you are to have an increase in your bleeding.    Get lots of rest. Take naps in the afternoon.    Increase your activities bit by bit.    Plan your activities so that you don t have to go up or down stairs more than needed.    Do postsurgical deep breathing and coughing exercises. Ask your healthcare provider for instructions.    Don t lift anything heavier than  your baby until your healthcare provider tells you it s OK.    Don t drive until your healthcare provider says it s OK.    Don t have sexual intercourse until after you ve had a checkup with your healthcare provider and you have decided on a birth control method.    Allow others to do things for you. Don't hesitate to ask for help.  Follow-up  Make a follow-up appointment as directed by our staff.  When to call your healthcare provider  Call your healthcare provider right away if you have any of these:    Fever of 100.4 F (38 C) or higher    Redness, pain, or drainage at your incision site    Bleeding that requires a new sanitary pad every hour    Severe pain in the abdomen    Pain or urgency with urination    Foul odor from vaginal discharge    Trouble urinating or emptying your bladder    No bowel movement within 1 week after the birth of your baby    Swollen, red, painful area in the leg    Appearance of rash or hives    Sore, red, painful area on the breasts that may come with flu-like symptoms    Feelings of anxiety, panic, and/or depression  [employer] last reviewed this educational content on 2/1/2018 2000-2021 [employer]. All rights reserved. This information is not intended as a substitute for professional medical care. Always follow your healthcare professional's instructions.

## 2021-12-04 NOTE — PLAN OF CARE
Vss, postpartum assessment WDL.Taking tylenol and ibuprofen for pain. Breast feeding infant independently. SW was in to see patient for an EDS score of 12, see note. Patient declined flu vaccine. Discharge and home med instructions discussed with patient, all questions answered. Patient knows she needs to follow up with her provider in 1 and 6 weeks. Patient was discharged home at 1130.

## 2021-12-04 NOTE — PLAN OF CARE
Patient VSS and WNL.  Patient;s infant was cluster feeding and mom requested a pacifier and then a respite having RN take infant out so she could rest some.  Infant was taken for 2.25 hours and patient slept.  Patient's infant was returned.  And patient was woken up to feed infant.  Patient was given pain medications (Ibuprofen and tylenol).  Patient's bleeding is scant. Patient is now resting again.

## 2021-12-04 NOTE — PROGRESS NOTES
Post Partum Progress Note  POD#2  Subjective:  She is resting comfortably in bed this morning, breastfeeding. Having pain, but feels it is manageable with medications.  Lochia present but minimal. Tolerating PO intake. Voiding ok and passing gas. No BM yet. Ambulating without dizziness or difficulty. Wants to discuss birth control at her postpartum visit. Reports mood is good this morning. No other questions or concerns today.    Objective:  Vitals:    21 1632 21 2230 21 0500 21 0737   BP: 99/63 111/64 110/68 97/66   BP Location:  Left arm Left arm    Cuff Size: Adult Regular      Pulse: 75 74 69 66   Resp: 16 16 16 16   Temp: 98.3  F (36.8  C) 98.2  F (36.8  C) 98.4  F (36.9  C) 97.8  F (36.6  C)   TempSrc:  Oral Oral Oral   SpO2: 99%  99%        General: NAD. Sitting up in bed breastfeeding infant  CV: Well-perfused  Pulm: Normal respiratory effort.  Abd: Soft, non-tender, non-distended. Fundus is firm and below the umbilicus.  Incision: C/d/i.     Hemoglobin   Date Value Ref Range Status   2021 11.3 (L) 11.7 - 15.7 g/dL Final   2021 13.5 11.7 - 15.7 g/dL Final       Assessment/Plan:  Thea Rodriguez is a 36 year old  female who is POD#2  s/p repeat low-transverse  section. Pregnancy c/b AMA, hx of 2 prior C/S., h/o PPH, and child w/ Trisomy 21.    # Routine postpartum  - Encourage routine post-operative goals including ambulation and incentive spirometry  - PNC: Rh +. Rubella +. No intervention indicated.  - Pain: controlled on oral medications  - Heme: Hgb 12.6 >  mL > 11.3.    - Hypotensive overnight, asymptomatic. Similar to her baseline.  - GI: continue anti-emetics and stool softeners as needed.  - :  Voiding spontaneously   - Mood: Nursing reports EDS 12 overnight, mood good this morning. Mood check after discharge.  - Feeding: Breast  - BC: Declines at this time    Discharge to home today.  Follow-up in clinic in 1 week for mood check and 6  weeks for routine postpartum visit.    Flavia Leger MD  OB/GYN Resident, PGY-1  2021, 8:55 AM         Physician Attestation   I, Flavia Maracno MD, personally examined and evaluated this patient.  I discussed the patient with the resident/fellow and care team, and agree with the assessment and plan of care as documented in the note of 21.      I personally reviewed vital signs, medications, labs and exam.    Key findings: 36 year old  on POD 2 s/p RLTCS, doing well. Ready for discharge to home. EDS score 12. Will have social work see her and plan to follow up outpatient with mood check in 1-2 weeks. Reviewed discharge instructions including activity restrictions, pelvic rest and follow up plan. Reviewed signs of excessive bleeding, infection, postpartum pre-eclampsia, mastitis, DVT and postpartum depression - instructed patient to call if concerned about any of these or other concerns. Patient to follow up in 1-2 weeks for mood check, 6 weeks for routine postpartum visit, undecided for contraception. All questions answered.    Flavia Marcano MD  Date of Service (when I saw the patient): 21

## 2021-12-04 NOTE — PROGRESS NOTES
SW was consulted to meet with Thea regarding a score of 12 on her [place] Depression Scale. SW met with Thea, her  and her sister. SW was given permission by Thea to speak openly in front of her family. Thea declined an intepreter and utilized her  for assistance. SW inquired about how Thea has been feeling and coping with her pregnancy and now . Thea stated that she is doing well, she is happy about their baby. Thea reports that she doesn't have any concerns with depression, feeling sad, anxiety or feeling overwhelmed or scared. Thea and her  report that they have adequate family support and that Thea's mother in law will be coming to stay for 20 days to help the family. Thea reports that they belong to a [employer] and attend regularly and find that supportive. SW talked with Thea and her  about PPD and what to look for as far as symptoms. They reported understanding this and acknowledged that they have talked to their dr about this as well. Thea and her  feel confident that they know what signs to look for and that they can access supports and services if needed. Thea's affect appeared to be appropriate to the situation.     To note, there was some question as to how accurate the scale/assessment was given the timing of the assessment needing to be completed, if Thea needed help to complete and whether an interpretor was used or not.     SW updated the medical team and encouraged them to reach out with further questions if needed.    PARISA Orellana [facility]  Pediatric On-Call

## 2021-12-04 NOTE — PROVIDER NOTIFICATION
Patient EDS score = 12  (question 10/self harm was a zero).  Social work consult was entered just now.  Supposed to be 11am discharge.  Please advise. Thank you.       12/03/21 5417   Provider Notification   Provider Name/Title dr liu   Method of Notification Electronic Page   Request Evaluate-Remote   Notification Reason Other

## 2021-12-04 NOTE — PLAN OF CARE
Data: Vital signs within normal limits. Postpartum checks within normal limits - see flow record. Patient eating and drinking normally.  Family brought preferred food for her. Patient able to empty bladder independently and is up ambulating. No apparent signs of infection. Incision healing well. Patient performing self cares and is able to care for infant.  Patient has a family friend here to assistance.  EDS score was 12, provider was paged and Social Work consult entered.  Charge made aware as well.  Patient turned in birth certificate as well.    Action: Patient medicated during the shift for pain. See MAR. Patient reassessed within 1 hour after each medication and pain was improved - patient stated she was comfortable. Patient education done about dishcarge. See flow record.  Response: Positive attachment behaviors observed with infant. Support persons was present.   was here for evening and family friend here for the overnight.   Plan: Anticipate discharge in the morning - 12/4/2021 @ 1100.

## 2021-12-05 ENCOUNTER — MEDICAL CORRESPONDENCE (OUTPATIENT)
Dept: HEALTH INFORMATION MANAGEMENT | Facility: CLINIC | Age: 36
End: 2021-12-05
Payer: MEDICAID

## 2021-12-06 ENCOUNTER — TELEPHONE (OUTPATIENT)
Dept: OBGYN | Facility: CLINIC | Age: 36
End: 2021-12-06
Payer: MEDICAID

## 2021-12-06 NOTE — TELEPHONE ENCOUNTER
----- Message from Flavia Macrano MD sent at 12/4/2021  9:15 AM CST -----  Regarding: postpartum  Patient has been discharged and needs the following appointments scheduled:  Phone 1-2 Week Mood Check and In person 6 Week Routine Post-partum Visit    Appointments should be scheduled with Rajni Meraz MD      Please call patient to schedule. Thank you!    Flavia Marcano MD

## 2021-12-21 ENCOUNTER — VIRTUAL VISIT (OUTPATIENT)
Dept: OBGYN | Facility: CLINIC | Age: 36
End: 2021-12-21
Payer: COMMERCIAL

## 2021-12-21 PROBLEM — R45.86 MOOD ALTERED: Status: RESOLVED | Noted: 2021-12-21 | Resolved: 2021-12-21

## 2021-12-21 PROBLEM — R45.86 MOOD ALTERED: Status: ACTIVE | Noted: 2021-12-21

## 2021-12-21 PROCEDURE — 99024 POSTOP FOLLOW-UP VISIT: CPT | Performed by: OBSTETRICS & GYNECOLOGY

## 2021-12-21 NOTE — PROGRESS NOTES
Thea is a 36 year old who is being evaluated via a billable telephone visit.      What phone number would you like to be contacted at? [phone]  How would you like to obtain your AVS? Meaghantran      Thea Rodriguez is a 36 year old female   S/p repeat  section on 21  Sometimes still has sharp pain on the left side sometimes. Takes tylenol and ibuprofen.   Abdominal binder helps with the pain.    Incision seems to be healing well.   Still has some vaginal bleeding. Seems intermittent.   Mood is fine.    Has noted some itching over the abdomen but no rash .  Breast feeding going well.    is helping.   She is avoiding heavy lifting.     OBJECTIVE:   LMP 2021    telephone encounter so no exam  Mood seems fine.       ASSESSMENT:  S/p  section 3 wks ago.    Mood is stable  Patient doing well.      PLAN:   discussed patient should remove steri strips if still on, can use lotion on the abdomen after shower   Bleeding will stop most likely in 1-2 wks.   Patient has a follow-up pp visit on  at [facility].   All of her questions were fully answered.    Total phone time 15 minutes.   I spent a total of 17 minutes reviewing records, reports, documentation and discussing with the patient on the date of encounter.

## 2022-01-18 ENCOUNTER — PRENATAL OFFICE VISIT (OUTPATIENT)
Dept: OBGYN | Facility: CLINIC | Age: 37
End: 2022-01-18
Payer: COMMERCIAL

## 2022-01-18 VITALS
WEIGHT: 151 LBS | TEMPERATURE: 97.4 F | DIASTOLIC BLOOD PRESSURE: 69 MMHG | SYSTOLIC BLOOD PRESSURE: 111 MMHG | BODY MASS INDEX: 27.62 KG/M2 | HEART RATE: 68 BPM

## 2022-01-18 DIAGNOSIS — N94.9 UTERINE TENDERNESS: ICD-10-CM

## 2022-01-18 LAB
CRP SERPL-MCNC: 4.6 MG/L (ref 0–8)
ERYTHROCYTE [DISTWIDTH] IN BLOOD BY AUTOMATED COUNT: 13.6 % (ref 10–15)
HCT VFR BLD AUTO: 44.9 % (ref 35–47)
HGB BLD-MCNC: 14.5 G/DL (ref 11.7–15.7)
MCH RBC QN AUTO: 28.6 PG (ref 26.5–33)
MCHC RBC AUTO-ENTMCNC: 32.3 G/DL (ref 31.5–36.5)
MCV RBC AUTO: 89 FL (ref 78–100)
PLATELET # BLD AUTO: 221 10E3/UL (ref 150–450)
RBC # BLD AUTO: 5.07 10E6/UL (ref 3.8–5.2)
WBC # BLD AUTO: 6.3 10E3/UL (ref 4–11)

## 2022-01-18 PROCEDURE — 86140 C-REACTIVE PROTEIN: CPT | Performed by: OBSTETRICS & GYNECOLOGY

## 2022-01-18 PROCEDURE — 99207 PR POST PARTUM EXAM: CPT | Performed by: OBSTETRICS & GYNECOLOGY

## 2022-01-18 PROCEDURE — 99213 OFFICE O/P EST LOW 20 MIN: CPT | Performed by: OBSTETRICS & GYNECOLOGY

## 2022-01-18 PROCEDURE — 85027 COMPLETE CBC AUTOMATED: CPT | Performed by: OBSTETRICS & GYNECOLOGY

## 2022-01-18 PROCEDURE — 36415 COLL VENOUS BLD VENIPUNCTURE: CPT | Performed by: OBSTETRICS & GYNECOLOGY

## 2022-01-18 RX ORDER — L.ACIDOPH/B.ANIMALIS/B.LONGUM 15B CELL
1 CAPSULE ORAL DAILY
Qty: 30 CAPSULE | Refills: 1 | Status: SHIPPED | OUTPATIENT
Start: 2022-01-18 | End: 2022-10-13

## 2022-01-18 NOTE — PROGRESS NOTES
SUBJECTIVE:  Visit done with telephone interpretor   Thea DEIRDRE Rodriguez is a 36 year old female   here for a postpartum visit.  She had a repeat  Section on 21 delivering a healthy baby boy weighing 6 lbs 7 oz at term.     complains of stabing pain below the belly button.   Going on for 3 weeks.  This started 2 wks after the surgery. Seems to be related to constipation and straining.     delivery complications:  No  breast feeding:  Yes, going well.    bladder problems:  No  bowel problems/hemorrhoids:  Yes, complains of very bad constipation, has to stain to have a stool.  That makes her umbilical area hurt.   episiotomy/laceration/incision healed? Yes  vaginal flow:  None  contraception:  Considering an IUD  emotional adjustment:  doing well and happy, but gets upset when she is in pain.       PHQ 2019   PHQ-9 Total Score 0 0   Q9: Thoughts of better off dead/self-harm past 2 weeks Not at all Not at all       OB History    Para Term  AB Living   4 3 3 0 1 3   SAB IAB Ectopic Multiple Live Births   1 0 0 0 3      # Outcome Date GA Lbr Kranthi/2nd Weight Sex Delivery Anes PTL Lv   4 Term 21 39w0d  2.92 kg (6 lb 7 oz) M CS-LTranv Spinal  PACO      Name: FIORELLAMALE-THEA      Apgar1: 8  Apgar5: 9   3 Term 20 39w4d  3.66 kg (8 lb 1.1 oz) F CS-LTranv Spinal N PACO      Name: FIORELLAFEMALE-THEA      Apgar1: 9  Apgar5: 9   2 Term 2017   3.402 kg (7 lb 8 oz)  CS-Unspec   PACO      Name: Kidus   1 SAB               Obstetric Comments   Son with Down's syndrome            Lab Results   Component Value Date    PAP NIL 2021        PHQ 2019   PHQ-9 Total Score 0 0   Q9: Thoughts of better off dead/self-harm past 2 weeks Not at all Not at all       PATRIA-7 SCORE 2019   Total Score 0 0       OBJECTIVE:  Blood pressure 111/69, pulse 68, temperature 97.4  F (36.3  C), temperature source Oral, weight 68.5 kg (151 lb), last menstrual period  03/04/2021, currently breastfeeding.   General - pleasant female in no acute distress.  Breast - no nodularity, asymmetry or nipple discharge bilaterally.  Abdomen - soft,  nondistended, no hepatosplenomegaly. The incision is healed well, but There is induration/firmness for about a 4 cm band just above the incision and along the length of the incision.  There is no skin erythema.   Pelvic - EG: no lesions, BUS: within normal limits, Vagina: well rugated, no discharge, Cervix: unable to visualize, Uterus: adherent anteriorly, tender,  Adnexae: no masses or tenderness.  Rectovaginal - deferred.    ASSESSMENT:  Encounter Diagnoses   Name Primary?     Routine postpartum follow-up Yes     Uterine tenderness      Postpartum endometritis     patient is unusually tender over the lower abdomen and uterus.   Concern for endometritis.      PLAN:  Discussed treatment with abx for delayed PP endometritis.     May not resume normal activities yet.  Patient need to come back after ~ 3 wks for reassessment.   Pap smear was not  done today  labs per orders   Reviewed contraception, family planning and breast feeding expectations.   The patient is considering an IUD for birth control. This cannot be inserted until the inflammation/infection has been fully treated   rec avoiding intercourse until follow-up visit.     Rajni Meraz MD

## 2022-02-08 ENCOUNTER — OFFICE VISIT (OUTPATIENT)
Dept: OBGYN | Facility: CLINIC | Age: 37
End: 2022-02-08
Payer: MEDICAID

## 2022-02-08 VITALS
WEIGHT: 160.2 LBS | BODY MASS INDEX: 29.48 KG/M2 | HEART RATE: 76 BPM | HEIGHT: 62 IN | SYSTOLIC BLOOD PRESSURE: 106 MMHG | DIASTOLIC BLOOD PRESSURE: 67 MMHG

## 2022-02-08 DIAGNOSIS — Z30.011 ENCOUNTER FOR INITIAL PRESCRIPTION OF CONTRACEPTIVE PILLS: ICD-10-CM

## 2022-02-08 PROCEDURE — 99213 OFFICE O/P EST LOW 20 MIN: CPT | Mod: 24 | Performed by: OBSTETRICS & GYNECOLOGY

## 2022-02-08 RX ORDER — LACTOBACILLUS ACIDOPHILUS 20B CELL
1 CAPSULE ORAL DAILY
Qty: 30 CAPSULE | Refills: 0 | Status: SHIPPED | OUTPATIENT
Start: 2022-02-08 | End: 2022-03-10

## 2022-02-08 RX ORDER — ACETAMINOPHEN AND CODEINE PHOSPHATE 120; 12 MG/5ML; MG/5ML
0.35 SOLUTION ORAL DAILY
Qty: 84 TABLET | Refills: 3 | Status: SHIPPED | OUTPATIENT
Start: 2022-02-08 | End: 2023-03-14

## 2022-02-08 ASSESSMENT — MIFFLIN-ST. JEOR: SCORE: 1359.91

## 2022-02-08 NOTE — PROGRESS NOTES
HPI:  Thea Rodriguez is a 36 year old female who presents today for follow-up visit from her postpartum checkup 3 weeks ago.  She had a repeat  section 2021.  At her postpartum visit she had abdominal tenderness and induration above her incision.  She is here for follow-up today.  She reports that she still has discomfort in her lower abdomen.  The induration by her incision has improved.  She is asking about getting an IUD for birth control.  Denies any fevers chills or sweats.  She is breast-feeding  She has had 2 periods since delivery.        Past GYN history:   Lab Results   Component Value Date    PAP NIL 2021       Past Medical History:   Diagnosis Date     Down syndrome in child of prior pregnancy, currently pregnant 9/10/2019       Past Surgical History:   Procedure Laterality Date      SECTION        SECTION N/A 2020    Procedure:  SECTION;  Surgeon: Rajni Meraz MD;  Location: [facility]      SECTION N/A 2021    Procedure:  SECTION;  Surgeon: Rajni Meraz MD;  Location: [facility]       Family History   Problem Relation Age of Onset     Diabetes Mother 50     No Known Problems Father      No Known Problems Brother      No Known Problems Sister      No Known Problems Brother      No Known Problems Brother      No Known Problems Brother      No Known Problems Sister      Breast Cancer No family hx of      Heart Disease No family hx of          Medications:    Current Outpatient Medications:      acetaminophen (TYLENOL) 325 MG tablet, Take 2 tablets (650 mg) by mouth every 6 hours as needed for mild pain Start after Delivery., Disp: 100 tablet, Rfl: 0     ibuprofen (ADVIL/MOTRIN) 600 MG tablet, Take 1 tablet (600 mg) by mouth every 6 hours as needed for moderate pain Start after delivery, Disp: 60 tablet, Rfl: 0     omeprazole (PRILOSEC) 20 MG DR capsule, Take 1 capsule (20 mg) by mouth daily (Patient not taking: Reported on  "11/17/2021), Disp: 30 capsule, Rfl: 3     Prenatal Vit-Fe Fumarate-FA (PRENATAL MULTIVITAMIN W/IRON) 27-0.8 MG tablet, Take 1 tablet by mouth daily, Disp: 90 tablet, Rfl: 3     Probiotic Product (FLORAJEN DIGESTION) CAPS, Take 1 Dose by mouth daily, Disp: 30 capsule, Rfl: 1     senna-docusate (SENOKOT-S/PERICOLACE) 8.6-50 MG tablet, Take 1 tablet by mouth daily Start after delivery. (Patient not taking: Reported on 11/24/2021), Disp: 100 tablet, Rfl: 0     vitamin D3 (CHOLECALCIFEROL) 50 mcg (2000 units) tablet, Take 1 tablet (50 mcg) by mouth daily, Disp: 100 tablet, Rfl: 10    Allergies:  Patient has no known allergies.    ROS:  She denies abnormal vaginal bleeding, discharge  EXAM:  /67 (BP Location: Right arm, Patient Position: Sitting, Cuff Size: Adult Regular)   Pulse 76   Ht 1.575 m (5' 2\")   Wt 72.7 kg (160 lb 3.2 oz)   BMI 29.30 kg/m      General - pleasant female in no acute distress.  Neurological - Alert and oriented  Psych:  normal mood and affect.  normal respiratory and cardiovascular effort   Breast - deferred.  Abdomen - soft,  Nondistended. There is tenderness to palpation above the pubic symphysis.  The incision is well-healed with minimal induration.  Musculoskeletal - no gross deformities.     Pelvic:  EG - no lesions BUS - within normal limits.  Vagina - well rugated, no discharge.  Cervix - no lesions, no CMT.  Uterus -normal size, tender to palpation.  Adnexae - no masses or tenderness.  RV - deferred.    ASSESSMENT/PLAN:  (O86.12) Postpartum endometritis  (primary encounter diagnosis)  Comment:persistent uterine tenderness, no fever, normal WBC but pain persists.  Will treat for delayed PP endometritis.   Patient would like US as well.  Will check pelvic ultrasound for retained tissue although the suspicion is low.   Plan: amoxicillin-clavulanate (AUGMENTIN) 875-125 MG         tablet, Lactobacillus (FLORAJEN ACIDOPHILUS)         CAPS, US Pelvic Transabdominal and Transvaginal   "   (Z30.011) Encounter for initial prescription of contraceptive pills  Comment: Patient interested in intrauterine device.  Recommend against an IUD until the endometritis is has fully been treated and her pain has resolved.  For now she would like to go on birth control pills, instead  Plan: norethindrone (MICRONOR) 0.35 MG tablet     Recommend probiotics along with her antibiotics to prevent a vaginal yeast infection.       Rajni Meraz MD

## 2022-02-10 ENCOUNTER — APPOINTMENT (OUTPATIENT)
Dept: INTERPRETER SERVICES | Facility: CLINIC | Age: 37
End: 2022-02-10
Payer: MEDICAID

## 2022-02-11 ENCOUNTER — HOSPITAL ENCOUNTER (OUTPATIENT)
Dept: ULTRASOUND IMAGING | Facility: CLINIC | Age: 37
Discharge: HOME | End: 2022-02-11
Attending: OBSTETRICS & GYNECOLOGY | Admitting: OBSTETRICS & GYNECOLOGY
Payer: MEDICAID

## 2022-02-11 PROCEDURE — 76856 US EXAM PELVIC COMPLETE: CPT | Mod: 26 | Performed by: RADIOLOGY

## 2022-02-11 PROCEDURE — 76830 TRANSVAGINAL US NON-OB: CPT

## 2022-02-11 PROCEDURE — 76830 TRANSVAGINAL US NON-OB: CPT | Mod: 26 | Performed by: RADIOLOGY

## 2022-02-22 ENCOUNTER — MEDICAL CORRESPONDENCE (OUTPATIENT)
Dept: HEALTH INFORMATION MANAGEMENT | Facility: CLINIC | Age: 37
End: 2022-02-22
Payer: COMMERCIAL

## 2022-03-28 NOTE — PROGRESS NOTES
GYN Problem Visit                                                 CC:  Vaginal pain    HPI:  Thea Rodriguez is a 37 year old female who presents to clinic today complaining of vaginal pain    She had scheduled RLTCS on 12/2/21 at 39w0d.  Surgery uncomplicated.      Had PP visit on 1/18/22 complaining of pain under umbilicus, constipation.  Found to have induration/firmness for about 4cm just above incision and along length of incision.  No erythema.  Uterus adherent, anteriorly, tender.  Prescribed augmentin BID x 7 days.    Returned to office 2/8/22.  Induration around incision improved, but patient still reported discomfort in lower abdomen.  Thought to be due to delayed endometritis and  another round of augmentin BID x 10 days was prescribed.  Pelvic US subsequently performed and was negative.  She was also given a prescription for POP at that visit, although reports she did not start them.    Since then, still has pain and burning on left side.  Still feels unocmfortable.  Pain is below belly button on right, sometimes left, sometimes lower.  No vaginal pain.  Pain is all below umbilicus.  Nothing like this before    Reports pain was not better after courses of antiobiotics.    Gets worse with movement, burning.  Like incision isn't healing properly.  Skin is healing properly, but feels like inside isn't healing properly.  No drainage from incision now.  Before period, did have discharge from the vagina.  No itching or burning currently.  Improved with rest.  Nothing else seems to make it better    Feels like over time, pain has gotten different but not gone away.  Still there.  Nowadays, pain is every 2-3 days, but it used to be daily.    Normal activities include taking care of children.  Feels like she's fairly active.    + constipation.  Takes medication, but it doesn't work.  BM once a day.  Very hard to pass.  When she pushes, feels more pain on right side.  Takes stool softener once/day.  Rec  increasing to twice daily.  No diarrhea  No dysuria.  No nausea or vomiting  No fevers or chills        EXAM:  currently breastfeeding.   BMI= There is no height or weight on file to calculate BMI.  General - pleasant female in no acute distress.  Neck - supple without lymphadenopathy or thyromegaly.  Lungs - clear to auscultation bilaterally.  Heart - regular rate and rhythm without murmur.  Breast - no nodularity, skin changes, asymmetry or nipple discharge bilaterally. No axillary or supraclavicular lymphadenopathy.  Abdomen - soft, nondistended, no hepatosplenomegaly.  Diffusely tender across entire lower abdomen.  No rebound or guarding  No masses appreciated.  Rectovaginal - deferred.  Musculoskeletal - no gross deformities.  Neurological - normal strength, sensation, and mental status.      Assessment:    Thea Rodriguez is a 37 year old  who presents today to discuss abdominal pain.    Plan:  1. Abdominal pain, generalized  Feel some of her discomfort with activity is due to weakness of her core.  Suggested PT and she's open to this.  No evidence of endometritis or other infection at this point.  Recommend increasing stool softener to BID  If pain unimproved after 6 weeks, return to see surgeon, Dr. Meraz.  - Physical Therapy Referral; Future    2. Abdominal weakness  - Physical Therapy Referral; Future    3. Burning pain  Suspect nerve pain as superficial nerves in skin are healing after surgery.  Offered lidocaine patch to help, but should improve with time.  - Lidocaine (LIDOCARE) 4 % Patch; Place 1 patch onto the skin every 24 hours To prevent lidocaine toxicity, patient should be patch free for 12 hrs daily.  Dispense: 30 patch; Refill: 1    4. Encounter for general counseling and advice on contraceptive management  Discussed can schedule IUD insertion whenever she's ready.  Counseled that she should avoid unprotected intercourse for 2 weeks prior to that appointment.    RTC for IUD  insertion, sooner prn.      Flavia Cleary MD      34 minutes spent on the date of the encounter doing chart review, review of test results, interpretation of tests, patient visit and documentation

## 2022-03-29 ENCOUNTER — OFFICE VISIT (OUTPATIENT)
Dept: OBGYN | Facility: CLINIC | Age: 37
End: 2022-03-29
Payer: COMMERCIAL

## 2022-03-29 VITALS
OXYGEN SATURATION: 98 % | BODY MASS INDEX: 28.72 KG/M2 | HEART RATE: 83 BPM | SYSTOLIC BLOOD PRESSURE: 104 MMHG | DIASTOLIC BLOOD PRESSURE: 73 MMHG | WEIGHT: 157 LBS

## 2022-03-29 DIAGNOSIS — R19.8 ABDOMINAL WEAKNESS: ICD-10-CM

## 2022-03-29 DIAGNOSIS — R10.84 ABDOMINAL PAIN, GENERALIZED: Primary | ICD-10-CM

## 2022-03-29 DIAGNOSIS — Z30.09 ENCOUNTER FOR GENERAL COUNSELING AND ADVICE ON CONTRACEPTIVE MANAGEMENT: ICD-10-CM

## 2022-03-29 DIAGNOSIS — R52 BURNING PAIN: ICD-10-CM

## 2022-03-29 PROCEDURE — 99214 OFFICE O/P EST MOD 30 MIN: CPT | Performed by: OBSTETRICS & GYNECOLOGY

## 2022-03-29 RX ORDER — LIDOCAINE 4 G/G
1 PATCH TOPICAL EVERY 24 HOURS
Qty: 30 PATCH | Refills: 1 | Status: SHIPPED | OUTPATIENT
Start: 2022-03-29 | End: 2022-04-28

## 2022-04-28 ENCOUNTER — VIRTUAL VISIT (OUTPATIENT)
Dept: FAMILY MEDICINE | Facility: CLINIC | Age: 37
End: 2022-04-28

## 2022-04-28 DIAGNOSIS — N30.01 ACUTE CYSTITIS WITH HEMATURIA: ICD-10-CM

## 2022-04-28 DIAGNOSIS — R30.0 DYSURIA: Primary | ICD-10-CM

## 2022-04-28 LAB
ALBUMIN UR-MCNC: 30 MG/DL
APPEARANCE UR: CLEAR
BACTERIA #/AREA URNS HPF: ABNORMAL /HPF
BILIRUB UR QL STRIP: NEGATIVE
COLOR UR AUTO: ABNORMAL
GLUCOSE UR STRIP-MCNC: NEGATIVE MG/DL
HGB UR QL STRIP: ABNORMAL
KETONES UR STRIP-MCNC: NEGATIVE MG/DL
LEUKOCYTE ESTERASE UR QL STRIP: ABNORMAL
NITRATE UR QL: NEGATIVE
PH UR STRIP: 7 [PH] (ref 5–7)
RBC #/AREA URNS AUTO: ABNORMAL /HPF
SP GR UR STRIP: 1.01 (ref 1–1.03)
SQUAMOUS #/AREA URNS AUTO: ABNORMAL /LPF
UROBILINOGEN UR STRIP-ACNC: 0.2 E.U./DL
WBC #/AREA URNS AUTO: ABNORMAL /HPF
WBC CLUMPS #/AREA URNS HPF: PRESENT /HPF

## 2022-04-28 PROCEDURE — 81001 URINALYSIS AUTO W/SCOPE: CPT | Performed by: STUDENT IN AN ORGANIZED HEALTH CARE EDUCATION/TRAINING PROGRAM

## 2022-04-28 PROCEDURE — 99213 OFFICE O/P EST LOW 20 MIN: CPT | Mod: 95 | Performed by: STUDENT IN AN ORGANIZED HEALTH CARE EDUCATION/TRAINING PROGRAM

## 2022-04-28 PROCEDURE — 87086 URINE CULTURE/COLONY COUNT: CPT | Performed by: STUDENT IN AN ORGANIZED HEALTH CARE EDUCATION/TRAINING PROGRAM

## 2022-04-28 RX ORDER — CEPHALEXIN 500 MG/1
500 CAPSULE ORAL 2 TIMES DAILY
Qty: 14 CAPSULE | Refills: 0 | Status: SHIPPED | OUTPATIENT
Start: 2022-04-28 | End: 2022-05-05

## 2022-04-30 LAB — BACTERIA UR CULT: NORMAL

## 2022-05-02 ENCOUNTER — THERAPY VISIT (OUTPATIENT)
Dept: PHYSICAL THERAPY | Facility: CLINIC | Age: 37
End: 2022-05-02
Attending: OBSTETRICS & GYNECOLOGY
Payer: COMMERCIAL

## 2022-05-02 DIAGNOSIS — R19.8 ABDOMINAL WEAKNESS: ICD-10-CM

## 2022-05-02 DIAGNOSIS — R10.84 ABDOMINAL PAIN, GENERALIZED: ICD-10-CM

## 2022-05-02 PROCEDURE — 97161 PT EVAL LOW COMPLEX 20 MIN: CPT | Mod: GP | Performed by: PHYSICAL THERAPIST

## 2022-05-02 PROCEDURE — 97530 THERAPEUTIC ACTIVITIES: CPT | Mod: GP | Performed by: PHYSICAL THERAPIST

## 2022-05-02 NOTE — PROGRESS NOTES
[facility]    OUTPATIENT Physical Therapy ORTHOPEDIC EVALUATION  PLAN OF TREATMENT FOR OUTPATIENT REHABILITATION  (COMPLETE FOR INITIAL CLAIMS ONLY)  Patient's Last Name, First Name, M.I.  YOB: 1985  Thea Rodriguez    Provider s Name:  [facility]   Medical Record No.  [id number]   Start of Care Date:  05/02/22   Onset Date:  12/02/2103/29/22   Type:     _X__PT   ___OT Medical Diagnosis:    Encounter Diagnoses   Name Primary?    Abdominal pain, generalized     Abdominal weakness         Treatment Diagnosis:  abdominal pain        Goals:     05/02/22 0700   Constipation/Obstructive Defecation   Current Functional Level [place] Stool Scale level   Performance Level type 1   STG Target Performance Change [place] Stool Scale level to   Performance Level 2   Rationale Work toward normal voiding or evacuation patterns to focus on ADLS, work, or school   Due Date 06/06/22   LTG Target Performance Change [place] Stool Scale level to   Performance Level 3-4   Rationale Work toward normal voiding or evacuation patterns to focus on ADLS, work, or school   Due Date 06/27/22       Therapy Frequency:  1x a week  Predicted Duration of Therapy Intervention:  8 weeks    Charo Calvo, PT                 I CERTIFY THE NEED FOR THESE SERVICES FURNISHED UNDER        THIS PLAN OF TREATMENT AND WHILE UNDER MY CARE     (Physician attestation of this document indicates review and certification of the therapy plan).                     Certification Date From:  05/02/22   Certification Date To:  06/27/22    Referring Provider:  Flavia Cleary    Initial Assessment        See Epic Evaluation SOC Date: 05/02/22

## 2022-05-02 NOTE — PROGRESS NOTES
Physical Therapy Initial Evaluation  Subjective:  The history is provided by the patient. A  was used.   Patient Health History  Thea Rodriguez being seen for abdominal pain, has been javier on for 4 months .     Date of Onset: 3/29/22 date of order.      Pain is reported as 7/10 on pain scale.  General health as reported by patient is good.  Pertinent medical history includes: none.         Other surgery history details: c-sections x3.    Current medications:  Pain medication.    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   Primary job tasks include:  Lifting/carrying and repetitive tasks.                  Therapist Generated HPI Evaluation  Problem details: The patient presents today with abdominal pain and weakness s/p  performed on 21. She notes her abdominal pain started since her . She feels a sharp and burning pain above her incision. She has more pain with wearing jeans or tighter clothes. She will have pain when straining for a bowel movement. Her pain medication does seem to help with the pain.      Patient denies any urinary incontinence or urgency symptoms. She will void 3x at night. Bowel movements are hard, 1x a day. She will ignore the urge to have a bowel movement and does strain with bowel movements. She denies any pain with sexual activity..         Type of problem:  Other.    This is a new condition.  Condition occurred with:  After surgery.  Where condition occurred: other.  Site of Pain: below umbilicus superior to  incision.  Pain is described as burning and sharp and is intermittent.  Pain is worse during the day.  Since onset symptoms are unchanged.  Symptoms are exacerbated by clothes (straining for bowel movements)  Relieved by: pain medication.      Restrictions due to condition include:  Working in normal job without restrictions.  Barriers include:  None as reported by patient.                        Objective:  Standing Alignment:         Lumbar:  Lordosis incr                           Lumbar/SI Evaluation  ROM:    AROM Lumbar:   Flexion:        Finger tips to toes no pain  Ext:                    Major loss, pain in abdomen with movement   Side Bend:        Left:     Right:   Rotation:           Left:     Right:   Side Glide:        Left:     Right:         Strength: LE strength seated: flexion: 4/5 bilaterally, abduction: 5/5 bilaterally, adduction: 5/5 bilaterally                                            Pelvic Dysfunction Evaluation:          Abdominal Wall:  Abdominal wall pelvic: palpation: increased sensation to light touch to skin below umbilicus, slight distention of abdomen above  incision and below umbilicus, as well as left lower quadrant.  Abdominal wall diastasis recti pelvic: 2.5 finger widths at umbilicus, abdominal tenting with supine to sit transfer.    Scar Mobility:  No reproduction of pain symptoms with scar tissue assessment along  incision                                 General     ROS    Assessment/Plan:    Patient is a 37 year old female with pelvic complaints.    Patient has the following significant findings with corresponding treatment plan.                Diagnosis 1:  Abdominal pain  Pain -  hot/cold therapy, US, electric stimulation, mechanical traction, manual therapy, STS, splint/taping/bracing/orthotics, self management, education, directional preference exercise and home program  Decreased ROM/flexibility - manual therapy, therapeutic exercise, therapeutic activity and home program  Decreased strength - therapeutic exercise, therapeutic activities and home program    Therapy Evaluation Codes:   Cumulative Therapy Evaluation is: Low complexity.    Previous and current functional limitations:  (See Goal Flow Sheet for this information)    Short term and Long term goals: (See Goal Flow Sheet for this information)     Communication ability:  Patient appears to be able to clearly communicate  and understand verbal and written communication and follow directions correctly.  Treatment Explanation - The following has been discussed with the patient:   RX ordered/plan of care  Anticipated outcomes  Possible risks and side effects  This patient would benefit from PT intervention to resume normal activities.   Rehab potential is good.    Frequency:  1 X week, once daily  Duration:  for 8 weeks  Discharge Plan:  Achieve all LTG.  Independent in home treatment program.  Reach maximal therapeutic benefit.    Please refer to the daily flowsheet for treatment today, total treatment time and time spent performing 1:1 timed codes.

## 2022-05-17 ENCOUNTER — OFFICE VISIT (OUTPATIENT)
Dept: OBGYN | Facility: CLINIC | Age: 37
End: 2022-05-17
Payer: COMMERCIAL

## 2022-05-17 VITALS
TEMPERATURE: 97.1 F | SYSTOLIC BLOOD PRESSURE: 103 MMHG | BODY MASS INDEX: 28.35 KG/M2 | DIASTOLIC BLOOD PRESSURE: 69 MMHG | WEIGHT: 155 LBS | HEART RATE: 71 BPM

## 2022-05-17 DIAGNOSIS — Z30.430 ENCOUNTER FOR INSERTION OF INTRAUTERINE CONTRACEPTIVE DEVICE: Primary | ICD-10-CM

## 2022-05-17 LAB — HCG UR QL: NEGATIVE

## 2022-05-17 PROCEDURE — 58300 INSERT INTRAUTERINE DEVICE: CPT | Performed by: OBSTETRICS & GYNECOLOGY

## 2022-05-17 PROCEDURE — 81025 URINE PREGNANCY TEST: CPT | Performed by: OBSTETRICS & GYNECOLOGY

## 2022-05-17 RX ORDER — COPPER 313.4 MG/1
1 INTRAUTERINE DEVICE INTRAUTERINE ONCE
Status: COMPLETED
Start: 2022-05-17 | End: 2022-05-17

## 2022-05-17 RX ORDER — COPPER 313.4 MG/1
1 INTRAUTERINE DEVICE INTRAUTERINE ONCE
COMMUNITY
End: 2023-03-14

## 2022-05-17 RX ADMIN — COPPER 1 EACH: 313.4 INTRAUTERINE DEVICE INTRAUTERINE at 17:12

## 2022-05-17 NOTE — PROGRESS NOTES
"  IUD Insertion:  CONSULT:    Is a pregnancy test required: {Pregnancy test required:893275}  Was a consent obtained?  {Yes/No:462250::\"Yes\"}    Subjective: Thea Rodriguez is a 37 year old  presents for IUD and desires {OB IUD TYPE:504057} type IUD.    Patient has been given the opportunity to ask questions about all forms of birth control, including all options appropriate for Thea Rodriguez. Discussed that no method of birth control, except abstinence is 100% effective against pregnancy or sexually transmitted infection.     Thea Rodriguez understands she may have the IUD removed at any time. IUD should be removed by a health care provider.    The entire insertion procedure was reviewed with the patient, including care after placement.    Patient's last menstrual period was 2022 (approximate). {last sex (Optional):503220}. No allergy to betadine or shellfish. {std screenin}  HCG Qual Urine   Date Value Ref Range Status   2019 Positive (A) NEG^Negative Final     Comment:     This test is for screening purposes.  Results should be interpreted along with   the clinical picture.  Confirmation testing is available if warranted by   ordering [id number], HCG Quantitative Pregnancy.       hCG Urine Qualitative   Date Value Ref Range Status   2022 Negative Negative Final     Comment:     This test is for screening purposes.  Results should be interpreted along with the clinical picture.  Confirmation testing is available if warranted by ordering [id number], HCG Quantitative Pregnancy.         /69   Pulse 71   Temp 97.1  F (36.2  C)   Wt 70.3 kg (155 lb)   LMP 2022 (Approximate)   Breastfeeding No   BMI 28.35 kg/m      Pelvic Exam:   EG/BUS: normal genital architecture without lesions, erythema or abnormal secretions.   Vagina: moist, pink, rugae with physiologic discharge and secretions  Cervix: ***parous no lesions and pink, moist, closed, without lesion or CMT  Uterus: " "***position, mobile, no pain  Adnexa: within normal limits and no masses, nodularity, tenderness    PROCEDURE NOTE: -- IUD Insertion    Reason for Insertion: {IUD reasons:536829::\"contraception\"}    {IUD pain:489517}  Under sterile technique, cervix was visualized with speculum and prepped with {cleansing agents:741493::\"Betadine\"} solution swab x 3. {IUD instruments:413275::\"Tenaculum\"} was placed for stability. The uterus was gently straightened and sounded to {IUD SOUNDING DEPTHS:265235} cm. IUD prepared for placement, and IUD inserted according to 's instructions without difficulty or significant resitance, and deployed at the fundus. The strings were visualized and trimmed to {IUD SOUNDING DEPTHS:344922} cm from the external os. Tenaculum was removed and hemostasis noted. Speculum removed.  Patient tolerated procedure well.    Lot # ***  Exp: ***    EBL: minimal    Complications: none    ASSESSMENT:     ICD-10-CM    1. Encounter for IUD insertion  Z30.430 HCG Qual, Urine ([id number])     HCG Qual, Urine ([id number])   2. Encounter for insertion of intrauterine contraceptive device  Z30.430         PLAN:    Given 's handouts, including when to have IUD removed, list of danger s/sx, side effects and follow up recommended. Encouraged condom use for prevention of STD. Back up contraception advised for 7 days if progestin method. Advised to call for any fever, for prolonged or severe pain or bleeding, abnormal vaginal discharge, or unable to palpate strings. She was advised to use pain medications (ibuprofen) as needed for mild to moderate pain. Advised to follow-up in clinic in 4-6 weeks for IUD string check if unable to find strings or as directed by provider.     Rajni Meraz MD    "

## 2022-05-17 NOTE — PROGRESS NOTES
IUD Insertion:  CONSULT:    Is a pregnancy test required: Yes.  Was it positive or negative?  Negative  Was a consent obtained?  Yes    Subjective: Thea Rodriguez is a 37 year old  presents for IUD and desires Paragard type IUD.    Patient has been given the opportunity to ask questions about all forms of birth control, including all options appropriate for Thea Rodriguez. Discussed that no method of birth control, except abstinence is 100% effective against pregnancy or sexually transmitted infection.     Thea Rodriguez understands she may have the IUD removed at any time. IUD should be removed by a health care provider.  She is having monthly periods.  LMP was 22.  Stopped breast feeding one week ago.     The entire insertion procedure was reviewed with the patient, including care after placement.    LMP was 22   Last sexual activity: prior to her LMP. No allergy to betadine or shellfish. Patient declines STD screening     HCG Qual Urine   Date Value Ref Range Status     hCG Urine Qualitative   Date Value Ref Range Status   2022 Negative Negative Final     Comment:     This test is for screening purposes.  Results should be interpreted along with the clinical picture.  Confirmation testing is available if warranted by ordering [id number], HCG Quantitative Pregnancy.         /69   Pulse 71   Temp 97.1  F (36.2  C)   Wt 70.3 kg (155 lb)    Breastfeeding No   BMI 28.35 kg/m      Pelvic Exam:   EG/BUS: no  lesions, erythema or abnormal secretions.   Vagina: moist, pink, rugae with physiologic discharge and secretions  Cervix: nulliparous no lesions and pink, moist, closed, without lesion or CMT  Uterus: midposition, mobile, no pain  Adnexa: within normal limits and no masses, nodularity, tenderness    PROCEDURE NOTE: -- IUD Insertion    Reason for Insertion: contraception    Under sterile technique, cervix was visualized with speculum and prepped with Betadine solution swab x 3.  Tenaculum was placed for stability. The uterus was gently straightened and sounded to 8.0 cm. IUD prepared for placement, and IUD inserted according to 's instructions without difficulty or significant resitance, and deployed at the fundus. The strings were visualized and trimmed to 2.5 cm from the external os. Tenaculum was removed and hemostasis noted. Speculum removed.  Patient tolerated procedure well.      EBL: minimal    Complications: none    ASSESSMENT:     ICD-10-CM    1. Encounter for IUD insertion  Z30.430 HCG Qual, Urine ([id number])     HCG Qual, Urine ([id number])     paragard intrauterine copper device     paragard intrauterine copper IUD device 1 each     INSERTION INTRAUTERINE DEVICE   2. Encounter for insertion of intrauterine contraceptive device  Z30.430 paragard intrauterine copper device     paragard intrauterine copper IUD device 1 each     INSERTION INTRAUTERINE DEVICE        PLAN:  discussed side effects and normal expectations following IUD insertion.   The patient should return to clinic for a speculum examination for confirmation that the IUD is in place in one month. Bleeding pattern of this particular IUD was discussed with the patient.      Rajni Meraz MD

## 2022-06-07 ENCOUNTER — OFFICE VISIT (OUTPATIENT)
Dept: OBGYN | Facility: CLINIC | Age: 37
End: 2022-06-07
Payer: COMMERCIAL

## 2022-06-07 VITALS
SYSTOLIC BLOOD PRESSURE: 104 MMHG | WEIGHT: 152 LBS | HEART RATE: 70 BPM | TEMPERATURE: 97.1 F | DIASTOLIC BLOOD PRESSURE: 71 MMHG | BODY MASS INDEX: 27.8 KG/M2

## 2022-06-07 DIAGNOSIS — Z30.431 INTRAUTERINE DEVICE SURVEILLANCE: Primary | ICD-10-CM

## 2022-06-07 PROCEDURE — 99212 OFFICE O/P EST SF 10 MIN: CPT | Performed by: OBSTETRICS & GYNECOLOGY

## 2022-06-08 NOTE — PROGRESS NOTES
Thea Rodriguez is 37 year old female here for an IUD check.  She had a copper IUD placed about one month ago, without complication.  Since then she has been doing well. Has no concerns.  Has noted that her period was 2 days longer.  Not more painful.     OBJECTIVE:  /71   Pulse 70   Temp 97.1  F (36.2  C)   Wt 68.9 kg (152 lb)   LMP 05/02/2022   Breastfeeding No   BMI 27.80 kg/m     Gen: NAD   normal respiratory and cardiovascular effort  Pelvic:  EG - normal adult female.  BUS - within normal limits.  Vagina - well rugated, no discharge.  Cervix - no lesions, no CMT.  IUD strings noted at the cervix about 2-3 cm long.    ASSESSMENT:  IUD well placed    PLAN:  discussed normal bleeding pattern of copper IUD's and patient reassured that longer periods may occur with this IUD.   return to clinic when due for next annual physical exam or with any concerns in regards to her IUD.    Rajni Meraz MD

## 2022-08-01 ENCOUNTER — OFFICE VISIT (OUTPATIENT)
Dept: FAMILY MEDICINE | Facility: CLINIC | Age: 37
End: 2022-08-01
Payer: COMMERCIAL

## 2022-08-01 VITALS
DIASTOLIC BLOOD PRESSURE: 68 MMHG | SYSTOLIC BLOOD PRESSURE: 98 MMHG | TEMPERATURE: 98 F | RESPIRATION RATE: 12 BRPM | OXYGEN SATURATION: 100 % | BODY MASS INDEX: 28.09 KG/M2 | HEIGHT: 61 IN | WEIGHT: 148.8 LBS | HEART RATE: 68 BPM

## 2022-08-01 DIAGNOSIS — Z00.00 ROUTINE GENERAL MEDICAL EXAMINATION AT A HEALTH CARE FACILITY: ICD-10-CM

## 2022-08-01 DIAGNOSIS — Z00.00 ROUTINE HISTORY AND PHYSICAL EXAMINATION OF ADULT: ICD-10-CM

## 2022-08-01 DIAGNOSIS — E78.5 HYPERLIPIDEMIA LDL GOAL <160: ICD-10-CM

## 2022-08-01 DIAGNOSIS — R10.2 PELVIC PAIN IN FEMALE: Primary | ICD-10-CM

## 2022-08-01 DIAGNOSIS — Z83.3 FAMILY HISTORY OF DIABETES MELLITUS IN MOTHER: ICD-10-CM

## 2022-08-01 DIAGNOSIS — K59.00 CONSTIPATION, UNSPECIFIED CONSTIPATION TYPE: ICD-10-CM

## 2022-08-01 DIAGNOSIS — B07.0 PLANTAR WARTS: ICD-10-CM

## 2022-08-01 DIAGNOSIS — R73.01 IMPAIRED FASTING GLUCOSE: ICD-10-CM

## 2022-08-01 LAB
BASOPHILS # BLD AUTO: 0 10E3/UL (ref 0–0.2)
BASOPHILS NFR BLD AUTO: 1 %
EOSINOPHIL # BLD AUTO: 0.2 10E3/UL (ref 0–0.7)
EOSINOPHIL NFR BLD AUTO: 3 %
ERYTHROCYTE [DISTWIDTH] IN BLOOD BY AUTOMATED COUNT: 13.2 % (ref 10–15)
HBA1C MFR BLD: 5.6 % (ref 0–5.6)
HCT VFR BLD AUTO: 40.6 % (ref 35–47)
HGB BLD-MCNC: 13 G/DL (ref 11.7–15.7)
LYMPHOCYTES # BLD AUTO: 1.8 10E3/UL (ref 0.8–5.3)
LYMPHOCYTES NFR BLD AUTO: 29 %
MCH RBC QN AUTO: 27.8 PG (ref 26.5–33)
MCHC RBC AUTO-ENTMCNC: 32 G/DL (ref 31.5–36.5)
MCV RBC AUTO: 87 FL (ref 78–100)
MONOCYTES # BLD AUTO: 0.4 10E3/UL (ref 0–1.3)
MONOCYTES NFR BLD AUTO: 6 %
NEUTROPHILS # BLD AUTO: 3.8 10E3/UL (ref 1.6–8.3)
NEUTROPHILS NFR BLD AUTO: 61 %
PLATELET # BLD AUTO: 296 10E3/UL (ref 150–450)
RBC # BLD AUTO: 4.68 10E6/UL (ref 3.8–5.2)
WBC # BLD AUTO: 6.2 10E3/UL (ref 4–11)

## 2022-08-01 PROCEDURE — 80061 LIPID PANEL: CPT

## 2022-08-01 PROCEDURE — 99395 PREV VISIT EST AGE 18-39: CPT | Mod: 25

## 2022-08-01 PROCEDURE — 17110 DESTRUCTION B9 LES UP TO 14: CPT

## 2022-08-01 PROCEDURE — 36415 COLL VENOUS BLD VENIPUNCTURE: CPT

## 2022-08-01 PROCEDURE — 99213 OFFICE O/P EST LOW 20 MIN: CPT | Mod: 25

## 2022-08-01 PROCEDURE — 85025 COMPLETE CBC W/AUTO DIFF WBC: CPT

## 2022-08-01 PROCEDURE — 83036 HEMOGLOBIN GLYCOSYLATED A1C: CPT

## 2022-08-01 RX ORDER — IBUPROFEN 800 MG/1
800 TABLET, FILM COATED ORAL EVERY 8 HOURS PRN
Qty: 90 TABLET | Refills: 0 | Status: SHIPPED | OUTPATIENT
Start: 2022-08-01 | End: 2022-08-31

## 2022-08-01 RX ORDER — POLYETHYLENE GLYCOL 3350 17 G/17G
1 POWDER, FOR SOLUTION ORAL 2 TIMES DAILY
Qty: 578 G | Refills: 11 | Status: SHIPPED | OUTPATIENT
Start: 2022-08-01 | End: 2023-10-10

## 2022-08-01 ASSESSMENT — ENCOUNTER SYMPTOMS
DIARRHEA: 0
PALPITATIONS: 0
BREAST MASS: 0
SORE THROAT: 0
ARTHRALGIAS: 0
EYE PAIN: 0
FREQUENCY: 1
CONSTIPATION: 1
SHORTNESS OF BREATH: 0
WEAKNESS: 0
HEADACHES: 1
ABDOMINAL PAIN: 1
HEARTBURN: 0
COUGH: 0
CHILLS: 0
HEMATURIA: 0
HEMATOCHEZIA: 1
DIZZINESS: 0
PARESTHESIAS: 0
NERVOUS/ANXIOUS: 0
JOINT SWELLING: 0
MYALGIAS: 1
NAUSEA: 0
DYSURIA: 0
FEVER: 0

## 2022-08-01 ASSESSMENT — PAIN SCALES - GENERAL: PAINLEVEL: MODERATE PAIN (5)

## 2022-08-01 NOTE — PATIENT INSTRUCTIONS
It was nice meeting you today. Here are your instructions:    Age 38 - get your first mammogram.  Increase fruits and veggies - eat at least 1 green vegetable every day AND at least 1 apple or 1 orange everyday.  Increase activity - walking stairs/halls at your apartment building for 15-20  minutes.    Start Miralax (Polyethalyn glycol) 1 cap with FULL glass of water twice/day. Increase drinking water.  Start Ibuprofen 800 mg tab every 8 hours as needed for pain - no more than 3 tabs per day.     Schedule appointment with OBGYN to evaluation ongoing pelvic pain.         Zach Ruiz, KATHERINE, APRN    Preventive Health Recommendations  Female Ages 26 - 39  Yearly exam:   See your health care provider every year in order to  Review health changes.   Discuss preventive care.    Review your medicines if you your doctor has prescribed any.    Until age 30: Get a Pap test every three years (more often if you have had an abnormal result).    After age 30: Talk to your doctor about whether you should have a Pap test every 3 years or have a Pap test with HPV screening every 5 years.   You do not need a Pap test if your uterus was removed (hysterectomy) and you have not had cancer.  You should be tested each year for STDs (sexually transmitted diseases), if you're at risk.   Talk to your provider about how often to have your cholesterol checked.  If you are at risk for diabetes, you should have a diabetes test (fasting glucose).  Shots: Get a flu shot each year. Get a tetanus shot every 10 years.   Nutrition:   Eat at least 5 servings of fruits and vegetables each day.  Eat whole-grain bread, whole-wheat pasta and brown rice instead of white grains and rice.  Get adequate Calcium and Vitamin D.     Lifestyle  Exercise at least 150 minutes a week (30 minutes a day, 5 days of the week). This will help you control your weight and prevent disease.  Limit alcohol to one drink per day.  No smoking.   Wear sunscreen to prevent skin  cancer.  See your dentist every six months for an exam and cleaning.

## 2022-08-01 NOTE — PROGRESS NOTES
SUBJECTIVE:   CC: Thea Rodriguez is an 37 year old woman who presents for preventive health visit.       Patient has been advised of split billing requirements and indicates understanding: Yes  Healthy Habits:     Getting at least 3 servings of Calcium per day:  NO    Bi-annual eye exam:  NO    Dental care twice a year:  Yes    Sleep apnea or symptoms of sleep apnea:  None    Diet:  Regular (no restrictions)    Frequency of exercise:  None    Taking medications regularly:  Yes    Medication side effects:  None    PHQ-2 Total Score: 0    Additional concerns today:  Yes    After  surgery 8 months ago (20), patient is still having pain in the abdominal/pelvic area, usually pain 5-6/10. Back pain sometimes.     Pain comes and goes, 3-6 days/week. Denies vaginal bleeding, no pain with urination. No fevers.   + pain with bowel movements due to constipation - takes meds at home but reports they cause heartburn.      Today's PHQ-2 Score:   PHQ-2 (  [employer]) 2022   Q1: Little interest or pleasure in doing things 0   Q2: Feeling down, depressed or hopeless 0   PHQ-2 Score 0   PHQ-2 Total Score (12-17 Years)- Positive if 3 or more points; Administer PHQ-A if positive -   Q1: Little interest or pleasure in doing things Not at all   Q2: Feeling down, depressed or hopeless Not at all   PHQ-2 Score 0       Abuse: Current or Past (Physical, Sexual or Emotional) - No  Do you feel safe in your environment? Yes      Social History     Tobacco Use     Smoking status: Never Smoker     Smokeless tobacco: Never Used   Substance Use Topics     Alcohol use: No     If you drink alcohol do you typically have >3 drinks per day or >7 drinks per week? No    Alcohol Use 2022   Prescreen: >3 drinks/day or >7 drinks/week? No   Prescreen: >3 drinks/day or >7 drinks/week? -       Reviewed orders with patient.  Reviewed health maintenance and updated orders accordingly - Yes  Lab work is in process  BP Readings from  Last 3 Encounters:   22 98/68   22 104/71   22 103/69    Wt Readings from Last 3 Encounters:   22 67.5 kg (148 lb 12.8 oz)   22 68.9 kg (152 lb)   22 70.3 kg (155 lb)                  Patient Active Problem List   Diagnosis     Melasma     Family history of diabetes mellitus in mother     Hyperlipidemia LDL goal <160     Impaired fasting glucose     Status post  section     Abnormal chromosomal and genetic finding on  screening mother     Encounter for triage in pregnant patient      delivery delivered     Past Surgical History:   Procedure Laterality Date      SECTION        SECTION N/A 2020    Procedure:  SECTION;  Surgeon: Rajni Meraz MD;  Location: [facility]      SECTION N/A 2021    Procedure:  SECTION;  Surgeon: Rajni Meraz MD;  Location: [facility]       Social History     Tobacco Use     Smoking status: Never Smoker     Smokeless tobacco: Never Used   Substance Use Topics     Alcohol use: No     Family History   Problem Relation Age of Onset     Diabetes Mother 50     No Known Problems Father      No Known Problems Brother      No Known Problems Sister      No Known Problems Brother      No Known Problems Brother      No Known Problems Brother      No Known Problems Sister      Breast Cancer No family hx of      Heart Disease No family hx of          Current Outpatient Medications   Medication Sig Dispense Refill     ibuprofen (ADVIL/MOTRIN) 800 MG tablet Take 1 tablet (800 mg) by mouth every 8 hours as needed for moderate pain 90 tablet 0     paragard intrauterine copper device 1 each by Intrauterine route once       polyethylene glycol (MIRALAX) 17 GM/Dose powder Take 17 g (1 capful) by mouth 2 times daily 578 g 11     norethindrone (MICRONOR) 0.35 MG tablet Take 1 tablet (0.35 mg) by mouth daily (Patient not taking: No sig reported) 84 tablet 3     omeprazole (PRILOSEC) 20 MG DR capsule  Take 1 capsule (20 mg) by mouth daily (Patient not taking: No sig reported) 30 capsule 3     Prenatal Vit-Fe Fumarate-FA (PRENATAL MULTIVITAMIN W/IRON) 27-0.8 MG tablet Take 1 tablet by mouth daily (Patient not taking: No sig reported) 90 tablet 3     Probiotic Product (FLORAJEN DIGESTION) CAPS Take 1 Dose by mouth daily (Patient not taking: No sig reported) 30 capsule 1     vitamin D3 (CHOLECALCIFEROL) 50 mcg (2000 units) tablet Take 1 tablet (50 mcg) by mouth daily (Patient not taking: No sig reported) 100 tablet 10     Recent Labs   Lab Test 22  0953 10/28/21  2140 10/06/21  1428 21  0934 20  1018 19  1025 19  0940   A1C 5.6  --  5.6 5.7*  --   --   --    LDL  --   --   --  167*  --   --  162*   HDL  --   --   --  36*  --   --  33*   TRIG  --   --   --  99  --   --  102   ALT  --  15  --   --  19  --   --    CR  --  0.57  --   --   --   --   --    GFRESTIMATED  --  >90  --   --   --   --   --    POTASSIUM  --  3.9  --   --   --   --   --    TSH  --   --   --  2.76  --  2.56  --         Breast Cancer Screening:  Any new diagnosis of family breast, ovarian, or bowel cancer? No    FHS-7: No flowsheet data found.    Patient under 40 years of age: Routine Mammogram Screening not recommended.   Pertinent mammograms are reviewed under the imaging tab.    History of abnormal Pap smear: NO - age 30- 65 PAP every 3 years recommended  PAP / HPV Latest Ref Rng & Units 3/3/2021   PAP (Historical) - NIL   HPV16 NEG:Negative Negative   HPV18 NEG:Negative Negative   HRHPV NEG:Negative Negative     Reviewed and updated as needed this visit by clinical staff   Tobacco  Allergies  Meds   Med Hx  Surg Hx  Fam Hx  Soc Hx          Reviewed and updated as needed this visit by Provider                   OB History    Para Term  AB Living   4 3 3 0 1 3   SAB IAB Ectopic Multiple Live Births   1 0 0 0 3      # Outcome Date GA Lbr Kranthi/2nd Weight Sex Delivery Anes PTL Lv   4 Term 21  "39w0d  2.92 kg (6 lb 7 oz) M CS-LTranv Spinal  PACO      Name: FIORELLAMALE-KATELYNN      Apgar1: 8  Apgar5: 9   3 Term 02/12/20 39w4d  3.66 kg (8 lb 1.1 oz) F CS-LTranv Spinal N PACO      Name: FIORELLAFEMALE-KATELYNN      Apgar1: 9  Apgar5: 9   2 Term 2017   3.402 kg (7 lb 8 oz)  CS-Unspec   PACO      Name: Kidus   1 SAB               Obstetric Comments   Son with Down's syndrome       Review of Systems   Constitutional: Negative for chills and fever.   HENT: Negative for congestion, ear pain, hearing loss and sore throat.    Eyes: Negative for pain and visual disturbance.   Respiratory: Negative for cough and shortness of breath.    Cardiovascular: Negative for chest pain, palpitations and peripheral edema.   Gastrointestinal: Positive for abdominal pain, constipation and hematochezia. Negative for diarrhea, heartburn and nausea.   Breasts:  Negative for tenderness, breast mass and discharge.   Genitourinary: Positive for frequency. Negative for dysuria, genital sores, hematuria, pelvic pain, urgency, vaginal bleeding and vaginal discharge.   Musculoskeletal: Positive for myalgias. Negative for arthralgias and joint swelling.   Skin: Negative for rash.   Neurological: Positive for headaches. Negative for dizziness, weakness and paresthesias.   Psychiatric/Behavioral: Negative for mood changes. The patient is not nervous/anxious.           OBJECTIVE:   BP 98/68 (BP Location: Right arm, Patient Position: Chair, Cuff Size: Adult Regular)   Pulse 68   Temp 98  F (36.7  C) (Oral)   Resp 12   Ht 1.558 m (5' 1.34\")   Wt 67.5 kg (148 lb 12.8 oz)   SpO2 100%   Breastfeeding No   BMI 27.81 kg/m    Physical Exam  GENERAL: healthy, alert and no distress  EYES: Eyes grossly normal to inspection, PERRL and conjunctivae and sclerae normal  HENT: ear canals and TM's normal, nose and mouth without ulcers or lesions  NECK: no adenopathy, no asymmetry, masses, or scars and thyroid normal to palpation  RESP: lungs clear to " auscultation - no rales, rhonchi or wheezes  CV: regular rate and rhythm, normal S1 S2, no S3 or S4, no murmur, click or rub, no peripheral edema and peripheral pulses strong  ABDOMEN: POSITIVE for tenderness with light palpation of lower quadrants, with placement of stethescope for auscultation of BS. No pelvic exam attempted due to pain. otherwise soft, nontender, no hepatosplenomegaly, no masses and bowel sounds normal.  MS: no gross musculoskeletal defects noted, no edema  SKIN: POSITIVE for small plantar wart noted on ball of left foot. No other suspicious lesions or rashes  NEURO: Normal strength and tone, mentation intact and speech normal  PSYCH: mentation appears normal, affect normal/bright    Diagnostic Test Results:  Labs reviewed in Epic  Results for orders placed or performed in visit on 08/01/22 (from the past 24 hour(s))   Hemoglobin A1c (aka HBA1C)   Result Value Ref Range    Hemoglobin A1C 5.6 0.0 - 5.6 %   CBC with platelets and differential    Narrative    The following orders were created for panel order CBC with platelets and differential.  Procedure                               Abnormality         Status                     ---------                               -----------         ------                     CBC with platelets and d...[[id number]]                      Final result                 Please view results for these tests on the individual orders.   CBC with platelets and differential   Result Value Ref Range    WBC Count 6.2 4.0 - 11.0 10e3/uL    RBC Count 4.68 3.80 - 5.20 10e6/uL    Hemoglobin 13.0 11.7 - 15.7 g/dL    Hematocrit 40.6 35.0 - 47.0 %    MCV 87 78 - 100 fL    MCH 27.8 26.5 - 33.0 pg    MCHC 32.0 31.5 - 36.5 g/dL    RDW 13.2 10.0 - 15.0 %    Platelet Count 296 150 - 450 10e3/uL    % Neutrophils 61 %    % Lymphocytes 29 %    % Monocytes 6 %    % Eosinophils 3 %    % Basophils 1 %    Absolute Neutrophils 3.8 1.6 - 8.3 10e3/uL    Absolute Lymphocytes 1.8 0.8 - 5.3 10e3/uL  "   Absolute Monocytes 0.4 0.0 - 1.3 10e3/uL    Absolute Eosinophils 0.2 0.0 - 0.7 10e3/uL    Absolute Basophils 0.0 0.0 - 0.2 10e3/uL       ASSESSMENT/PLAN:       ICD-10-CM    1. Pelvic pain in female  R10.2 CBC with platelets and differential     ibuprofen (ADVIL/MOTRIN) 800 MG tablet     CBC with platelets and differential    Checking cbc to ensure no signs of bleeding or infection. Referred to OB. Recommended US but patient declined - wants OB to decide what she should do.   2. Family history of diabetes mellitus in mother  Z83.3 Hemoglobin A1c   3. Hyperlipidemia LDL goal <160  E78.5 Lipid panel reflex to direct LDL Fasting     Lipid panel reflex to direct LDL Fasting   4. Impaired fasting glucose  R73.01 Hemoglobin A1c (aka HBA1C)       Rechecking A1c today to confirm pre-diabetes (5/7 from 1 year ago did not worsen).   5. Routine history and physical examination of adult  Z00.00    6. Routine general medical examination at a health care facility  Z00.00 REVIEW OF HEALTH MAINTENANCE PROTOCOL ORDERS   7. Constipation, unspecified constipation type  K59.00 polyethylene glycol (MIRALAX) 17 GM/Dose powder   8. Plantar warts  B07.0  cryotherapy       Liquid nitrogen was applied for 5 seconds x 3 to lesion on left ball of foot and the expected blistering or scabbing reaction explained. Do not pick at the area, cover for comfort. Return if lesion fails to fully resolve.    Patient has been advised of split billing requirements and indicates understanding: Yes    COUNSELING:  Reviewed preventive health counseling, as reflected in patient instructions  Special attention given to:        Regular exercise       Healthy diet/nutrition       Family planning       Osteoporosis prevention/bone health    Estimated body mass index is 27.81 kg/m  as calculated from the following:    Height as of this encounter: 1.558 m (5' 1.34\").    Weight as of this encounter: 67.5 kg (148 lb 12.8 oz).    Weight management plan: Discussed " healthy diet and exercise guidelines    She reports that she has never smoked. She has never used smokeless tobacco.      Counseling Resources:  ATP IV Guidelines  Pooled Cohorts Equation Calculator  Breast Cancer Risk Calculator  BRCA-Related Cancer Risk Assessment: FHS-7 Tool  FRAX Risk Assessment  ICSI Preventive Guidelines  Dietary Guidelines for Americans, 2010  USDA's MyPlate  ASA Prophylaxis  Lung CA Screening    KADEN Dumont CNP  [facility]

## 2022-08-02 LAB
CHOLEST SERPL-MCNC: 220 MG/DL
FASTING STATUS PATIENT QL REPORTED: YES
HDLC SERPL-MCNC: 37 MG/DL
LDLC SERPL CALC-MCNC: 156 MG/DL
NONHDLC SERPL-MCNC: 183 MG/DL
TRIGL SERPL-MCNC: 136 MG/DL

## 2022-09-18 ENCOUNTER — HEALTH MAINTENANCE LETTER (OUTPATIENT)
Age: 37
End: 2022-09-18

## 2022-09-19 ENCOUNTER — NURSE TRIAGE (OUTPATIENT)
Dept: NURSING | Facility: CLINIC | Age: 37
End: 2022-09-19

## 2022-09-19 NOTE — TELEPHONE ENCOUNTER
"Is this a 2nd Level Triage? NO    Situation: Constipation, rectal pain and bleeding.     Background: Pt has had constipation for about 9 months after she gave birth. Pt started to have rectal bleeding, and pain and hemorrhoids for about 2 months.     Assessment:   Pt reports rectal pain of \"7-8\", and she thinks she has hemorrhoids, as there is bleeding when she wipes, and she has been constipated. Pt reports the blood is only when she wipes, and it is a small amount. Pt reports also swelling, and discomfort in the past 2 months. Pt denies any fever, or itching. Pt does use Miralax, and has a prescription for this.      Care advice given:    SUPPOSITORY FOR ACUTE RECTAL PAIN:  * IF the sitz bath doesn't work, use 1 or 2 glycerin suppositories.  * You can get them at the drugstore. They are available over-the-counter.    WARM SALINE SITZ BATHS - FOR RECTAL SYMPTOMS:  * Sit in a warm sitz bath for 20 minutes twice a day.  * This will decrease swelling and irritation, keep the area clean, and help with healing.  * Afterwards, pat area dry with unscented toilet paper.    PREVENTING CONSTIPATION:  * Eat a high fiber diet.  * Drink adequate liquids.  * Exercise regularly (even a daily 15 minute walk!)  * Get into a rhythm - try to have a BM at the same time each day.  * Don't ignore your body's signals regarding having a BM.  * Avoid enemas and stimulant laxatives.    Protocol Recommended Disposition:   See in Office Within 3 Days    Recommendation:   Per protocol pt was advised to be evaluated within 3 days in the clinic. Pt was transferred to scheduling to make an appointment. Pt was advised to:    CALL BACK IF:  * Severe rectal pain  * Rectal pain not improved after 3 days  * Rectal bleeding is more than minor (e.g., more than just blood on toilet paper or few drops)  * Rectal bleeding is minor but is ongoing (e.g., occurs over 2 times)  * You become worse.    Pt verbalized understanding.    Claus Petit RN on " 9/19/2022 at 2:51 PM      Reason for Disposition    Rectal symptoms    Severe rectal pain    Additional Information    Negative: Passed out (i.e., fainted, collapsed and was not responding)    Negative: Shock suspected (e.g., cold/pale/clammy skin, too weak to stand, low BP, rapid pulse)    Negative: Vomiting red blood or black (coffee ground) material    Negative: Sounds like a life-threatening emergency to the triager    Negative: Diarrhea is main symptom    Negative: Sounds like a life-threatening emergency to the triager    Negative: Diarrhea is main symptom    Negative: Constipation is main symptom (e.g., pain or discomfort caused by passage of hard BMs)    Negative: Blood in or on bowel movement is main symptom    Negative: Sexual assault or rape (sexual intercourse or activity occurs without freely given consent), known or suspected    Negative: Injury to rectum    Negative: Patient sounds very sick or weak to the triager    Protocols used: RECTAL BLEEDING-A-OH, RECTAL SYMPTOMS-A-OH

## 2022-09-20 ENCOUNTER — OFFICE VISIT (OUTPATIENT)
Dept: FAMILY MEDICINE | Facility: CLINIC | Age: 37
End: 2022-09-20
Payer: COMMERCIAL

## 2022-09-20 VITALS
DIASTOLIC BLOOD PRESSURE: 54 MMHG | WEIGHT: 145 LBS | HEART RATE: 68 BPM | RESPIRATION RATE: 20 BRPM | TEMPERATURE: 99.3 F | BODY MASS INDEX: 27.38 KG/M2 | OXYGEN SATURATION: 100 % | HEIGHT: 61 IN | SYSTOLIC BLOOD PRESSURE: 100 MMHG

## 2022-09-20 DIAGNOSIS — K59.00 CONSTIPATION, UNSPECIFIED CONSTIPATION TYPE: ICD-10-CM

## 2022-09-20 DIAGNOSIS — K64.4 EXTERNAL HEMORRHOIDS: Primary | ICD-10-CM

## 2022-09-20 PROCEDURE — 99213 OFFICE O/P EST LOW 20 MIN: CPT | Performed by: FAMILY MEDICINE

## 2022-09-20 RX ORDER — HYDROCORTISONE ACETATE 25 MG/1
25 SUPPOSITORY RECTAL 2 TIMES DAILY
Qty: 20 SUPPOSITORY | Refills: 0 | Status: SHIPPED | OUTPATIENT
Start: 2022-09-20 | End: 2023-03-14

## 2022-09-20 RX ORDER — HYDROCORTISONE 25 MG/G
CREAM TOPICAL 2 TIMES DAILY PRN
Qty: 30 G | Refills: 0 | Status: SHIPPED | OUTPATIENT
Start: 2022-09-20 | End: 2023-03-14

## 2022-09-20 ASSESSMENT — PAIN SCALES - GENERAL: PAINLEVEL: SEVERE PAIN (7)

## 2022-09-20 NOTE — PROGRESS NOTES
"  Assessment & Plan     External hemorrhoids  Sitz bath  Avoid constipation  Miralax daily  Increase water intake and fiber intake.  If no improvement, may need a referral for surgical options.  - hydrocortisone, Perianal, (HYDROCORTISONE) 2.5 % cream; Place rectally 2 times daily as needed for hemorrhoids  - hydrocortisone (ANUSOL-HC) 25 MG suppository; Place 1 suppository (25 mg) rectally 2 times daily    6.  unspecified constipation type  Increase fiber intake and water intake  Continue with Miralax daily.    No follow-ups on file.    Néstor Cordon MD  [facility]    Reddy Sidhu is a 37 year oldpresenting for the following health issues:  Rectal Problem (Bleeding and pain)  History of constipation,  Rectal bleeding, sometime bleeding.  Pain with bowel movement.    .  She reports she takes MiraLAX every other day.  However, she does not take it every day.  She has no nausea, no vomiting, no fever or chills  HPI     Patient is here with rectal bleeding and pain lasting three days. Patient stated that her stools are firmed as well.      Review of Systems   Constitutional, HEENT, cardiovascular, pulmonary, gi and gu systems are negative, except as otherwise noted.      Objective    Pulse 68   Temp 99.3  F (37.4  C) (Oral)   Resp 20   Ht 1.55 m (5' 1.02\")   Wt 65.8 kg (145 lb)   SpO2 100%   Breastfeeding No   BMI 27.38 kg/m    Body mass index is 27.38 kg/m .  Physical Exam   Exam done with Female in exam room   GENERAL: healthy, alert and no distress  ABDOMEN: soft, nontender, no hepatosplenomegaly, no masses and bowel sounds normal  RECTAL (female): ( with female staff in room ) Positive for external hemorrhoid, no bleeding, not tender, large.      Néstor Cordon MD            "

## 2022-09-21 ENCOUNTER — TELEPHONE (OUTPATIENT)
Dept: FAMILY MEDICINE | Facility: CLINIC | Age: 37
End: 2022-09-21

## 2022-09-21 NOTE — TELEPHONE ENCOUNTER
Plan does not cover hydrocortisone (ANUSOL-HC) 25 MG suppository. Please call [phone] to initiate PA or switch to alternative medication.    Patient ID#: [id number]

## 2022-09-21 NOTE — TELEPHONE ENCOUNTER
Central Prior Authorization Team   Phone: [phone]      PA Initiation    Medication: hydrocortisone (ANUSOL-HC) 25 MG suppository--INITIATED  Insurance Company: [employer] - Phone [phone] Fax [phone]  Pharmacy Filling the Rx: [id number] [place], [place] - [facility] AT [facility] [address]  Filling Pharmacy Phone: [phone]  Filling Pharmacy Fax:    Start Date: 9/21/2022

## 2022-09-22 NOTE — TELEPHONE ENCOUNTER
Central Prior Authorization Team   Phone: [phone]      Prior Authorization Approval    Authorization Effective Date: 8/22/2022  Authorization Expiration Date: 9/22/2023  Medication: hydrocortisone (ANUSOL-HC) 25 MG suppository--APPROVED  Approved Dose/Quantity:    Reference #:     Insurance Company: [employer] - Phone [phone] Fax [phone]  Expected CoPay:       CoPay Card Available:      Foundation Assistance Needed:    Which Pharmacy is filling the prescription (Not needed for infusion/clinic administered): [id number] - [place], [place] - [address] AT [facility]  Pharmacy Notified: Yes  Patient Notified: Yes PHARMACY WILL CONTACT WHEN FILLED

## 2022-10-19 RX ORDER — L.ACIDOPH/B.ANIMALIS/B.LONGUM 15B CELL
1 CAPSULE ORAL DAILY
Qty: 30 CAPSULE | Refills: 1 | Status: SHIPPED | OUTPATIENT
Start: 2022-10-19 | End: 2023-03-14

## 2023-02-06 ENCOUNTER — OFFICE VISIT (OUTPATIENT)
Dept: FAMILY MEDICINE | Facility: CLINIC | Age: 38
End: 2023-02-06
Payer: COMMERCIAL

## 2023-02-06 ENCOUNTER — TELEPHONE (OUTPATIENT)
Dept: GASTROENTEROLOGY | Facility: CLINIC | Age: 38
End: 2023-02-06

## 2023-02-06 VITALS
HEART RATE: 69 BPM | BODY MASS INDEX: 26.24 KG/M2 | RESPIRATION RATE: 16 BRPM | OXYGEN SATURATION: 99 % | DIASTOLIC BLOOD PRESSURE: 73 MMHG | SYSTOLIC BLOOD PRESSURE: 104 MMHG | HEIGHT: 61 IN | WEIGHT: 139 LBS

## 2023-02-06 DIAGNOSIS — K59.00 CONSTIPATION, UNSPECIFIED CONSTIPATION TYPE: ICD-10-CM

## 2023-02-06 DIAGNOSIS — H57.13 EYE PAIN, BILATERAL: Primary | ICD-10-CM

## 2023-02-06 DIAGNOSIS — K64.4 EXTERNAL HEMORRHOIDS: ICD-10-CM

## 2023-02-06 PROCEDURE — 99214 OFFICE O/P EST MOD 30 MIN: CPT | Performed by: FAMILY MEDICINE

## 2023-02-06 RX ORDER — AZELASTINE HYDROCHLORIDE 0.5 MG/ML
1 SOLUTION/ DROPS OPHTHALMIC 2 TIMES DAILY
Qty: 6 ML | Refills: 1 | Status: SHIPPED | OUTPATIENT
Start: 2023-02-06 | End: 2023-03-14

## 2023-02-06 RX ORDER — POLYETHYLENE GLYCOL 400 2.5 MG/ML
1 SOLUTION/ DROPS OPHTHALMIC 3 TIMES DAILY PRN
Qty: 15 ML | Refills: 1 | Status: SHIPPED | OUTPATIENT
Start: 2023-02-06 | End: 2023-03-14

## 2023-02-06 ASSESSMENT — ENCOUNTER SYMPTOMS: EYE PAIN: 1

## 2023-02-06 NOTE — TELEPHONE ENCOUNTER
[facility] Call Center    Phone Message    May a detailed message be left on voicemail: yes     Reason for Call: Other: Pt scheduled outside of urgent time frame due to availability at Pt's preferred locations. TE sent per guidelines.     Action Taken: Message routed to:  [facility] GI    Travel Screening: Not Applicable

## 2023-02-06 NOTE — PROGRESS NOTES
"  Assessment & Plan     Eye pain, bilateral  Associated eye dryness likely exacerbated by allergy, management discussed with patient, eyes moisturized with bleeding, similar, allergy treatment with Optivar, consider referral to ophthalmologist if no improvement  - azelastine (OPTIVAR) 0.05 % ophthalmic solution; Place 1 drop into both eyes 2 times daily  - polyethylene glycol 400 (BLINK TEARS) 0.25 % SOLN ophthalmic solution; Place 1 drop into both eyes 3 times daily as needed for dry eyes    Constipation, unspecified constipation type  No improving MiraLAX, discussed healthy lifestyle changes increase fluid fiber  - Adult GI  Referral - Consult Only; Future    External hemorrhoids  Possibly contributing to her constipation, referred to GI for long-term management.  - Adult GI  Referral - Consult Only; Future    Review of external notes as documented elsewhere in note  30 minutes spent on the date of the encounter doing chart review, review of outside records, review of test results, interpretation of tests, patient visit and documentation        BMI:   Estimated body mass index is 26.26 kg/m  as calculated from the following:    Height as of this encounter: 1.549 m (5' 1\").    Weight as of this encounter: 63 kg (139 lb).   Weight management plan: Discussed healthy diet and exercise guidelines    Regular exercise    No follow-ups on file.    Carter Wheeler MD  [facility]    Reddy Sidhu is a 37 year old, presenting for the following health issues:  Eye Problem (Right Eye Pain/)      Eye Problem     History of Present Illness       Reason for visit:  Eye pain  Symptom onset:  More than a month  Symptoms include:  Eye  Symptom intensity:  Mild  Symptom progression:  Staying the same  Had these symptoms before:  No  What makes it worse:  Cooking    She eats 2-3 servings of fruits and vegetables daily.She consumes 5 sweetened beverage(s) daily.She exercises with enough " "effort to increase her heart rate 30 to 60 minutes per day.  She exercises with enough effort to increase her heart rate 3 or less days per week.   She is taking medications regularly.     She complains of \"eyes pain\", on further questioning pt stating that she has been having  Symptoms for  about 4 months,she said  itching eyes, watering eyes,sometimes burning sensation, No trauma. Vision is still good no blurry or  double vision.  Has tried over-the-counter eyedrops (not sure about the name) with no improvement.  Second complaint is constipation, stools are hard, she tried MiraLAX with no improvement.  She would like to be referred to a specialist.  She has a history of hemorrhoids but no recent exacerbation.      Review of Systems   Eyes: Positive for pain.      Constitutional, HEENT, cardiovascular, pulmonary, gi and gu systems are negative, except as otherwise noted.      Objective    /73 (BP Location: Left arm, Patient Position: Sitting, Cuff Size: Adult Regular)   Pulse 69   Resp 16   Ht 1.549 m (5' 1\")   Wt 63 kg (139 lb)   SpO2 99%   BMI 26.26 kg/m    Body mass index is 26.26 kg/m .  Physical Exam   GENERAL: healthy, alert and no distress  EYES: Eyes grossly normal to inspection and conjunctiva/corneas- conjunctival injection OU and Dryness,EOMs are intact  NECK: no adenopathy, no asymmetry, masses, or scars and thyroid normal to palpation  RESP: lungs clear to auscultation - no rales, rhonchi or wheezes  CV: regular rate and rhythm, normal S1 S2, no S3 or S4, no murmur, click or rub, no peripheral edema and peripheral pulses strong  ABDOMEN: soft, nontender, no hepatosplenomegaly, no masses and bowel sounds normal  MS: no gross musculoskeletal defects noted, no edema    This note was dictated using a voice recognition software.  Any grammatical or context distortion are unintentional and inherent to the software.                   "

## 2023-02-07 NOTE — TELEPHONE ENCOUNTER
Diagnosis, Referred by & from: Hemorrhoids   Appt date: 4/24/2023   NOTES STATUS DETAILS   OFFICE NOTE from referring provider [facility]:  2/6/23 - PCC OV with Dr. Wheeler   OFFICE NOTE from other specialist [facility] - :  3/14/23 - GI OV with Dr. Sabra Nixon [facility]:  9/20/22 - PCC OV with Dr. Cordon  8/1/22 - PCC OV with Zach Ruiz NP    [facility]:  3/29/22 - OBGYN OV with Dr. Cleary   DISCHARGE SUMMARY from hospital N/A    DISCHARGE REPORT from the ER N/A    OPERATIVE REPORT N/A    MEDICATION LIST Internal    LABS N/A    DIAGNOSTIC PROCEDURES N/A    IMAGING (DISC & REPORT)      ULTRASOUND  (ENDOANAL/ENDORECTAL) [facility]:  2/11/22 - US Pelvic

## 2023-03-14 ENCOUNTER — OFFICE VISIT (OUTPATIENT)
Dept: GASTROENTEROLOGY | Facility: CLINIC | Age: 38
End: 2023-03-14
Attending: FAMILY MEDICINE
Payer: COMMERCIAL

## 2023-03-14 VITALS
HEIGHT: 61 IN | BODY MASS INDEX: 25.47 KG/M2 | OXYGEN SATURATION: 100 % | WEIGHT: 134.9 LBS | DIASTOLIC BLOOD PRESSURE: 75 MMHG | HEART RATE: 75 BPM | SYSTOLIC BLOOD PRESSURE: 110 MMHG

## 2023-03-14 DIAGNOSIS — K59.00 CONSTIPATION, UNSPECIFIED CONSTIPATION TYPE: ICD-10-CM

## 2023-03-14 DIAGNOSIS — K64.4 EXTERNAL HEMORRHOIDS: ICD-10-CM

## 2023-03-14 PROCEDURE — 99203 OFFICE O/P NEW LOW 30 MIN: CPT | Performed by: PHYSICIAN ASSISTANT

## 2023-03-14 ASSESSMENT — PAIN SCALES - GENERAL: PAINLEVEL: NO PAIN (0)

## 2023-03-14 NOTE — NURSING NOTE
"Chief Complaint   Patient presents with     New Patient     New consult for chronic constipation and rectal bleeding.     She requests these members of her care team be copied on today's visit information:  PCP: Vi Castaneda    Referring Provider:  Carter Wheeler MD  [address] [place] [address]    Vitals:    03/14/23 1115   BP: 110/75   BP Location: Left arm   Patient Position: Sitting   Cuff Size: Adult Regular   Pulse: 75   SpO2: 100%   Weight: 61.2 kg (134 lb 14.4 oz)   Height: 1.549 m (5' 1\")     Body mass index is 25.49 kg/m .    Medications were reconciled.        Emily Leon CMA    "

## 2023-03-14 NOTE — PATIENT INSTRUCTIONS
It was nice meeting you today.  As we discussed, I would like you to continue to use the MiraLAX daily and keep your appointment with the colorectal doctor next month.  We do have some additional, more potent options to treat constipation if needed if the MiraLAX becomes less effective.  For now, I do not think we need to pursue a colonoscopy.  If your colorectal specialist feels that it would be warranted, they can help order that for you.  You can certainly follow-up with me as needed in the future.

## 2023-03-14 NOTE — PROGRESS NOTES
NEW PATIENT GI CLINIC VISIT    CC/REFERRING MD:  Carter Wheeler  REASON FOR CONSULTATION:   Carter Wheeler for   Chief Complaint   Patient presents with     New Patient     New consult for chronic constipation and rectal bleeding.       ASSESSMENT/PLAN: Patient is here for evaluation of ongoing constipation as well as known external hemorrhoid.  She states that she has been getting pretty adequate relief with daily use of MiraLAX.  I think this would be reasonable to continue.  We did discuss alternative options, such as a secretagogue.  She is comfortable continuing with MiraLAX.  She has known external hemorrhoid and likely has some internal hemorrhoids based on her history of occasional hematochezia when constipation is severe.  She is seeing colorectal provider next month, they can more properly assess for hemorrhoids and provide treatment if needed.  We reviewed conservative treatments for hemorrhoids that she can continue to pursue.  No indication for colonoscopy at this time.  Can follow-up with me as needed.      RTC PRN    Thank you for this consultation.  It was a pleasure to participate in the care of this patient; please contact us with any further questions.      30 minutes spent on the date of the encounter doing chart review, patient visit and documentation    This note was created with voice recognition software, and while reviewed for accuracy, typos may remain.     Kaleb Montaño PA-C  Division of Gastroenterology, Hepatology and Nutrition  [facility]  Thea Rodriguez is a 37 year old female that is seen as a new patient in the GI clinic today for constipation.  This problem began shortly after the birth of her last child little over a year ago.  She describes developing hard stool that was difficult to pass.  She was having a bowel movement roughly every 3 days and occasionally needing to manually disimpact the rectal vault with her fingers to  pass stool.  She then started using MiraLAX on a daily basis and is now having [place] stool chart 3 stools that are relatively easy to pass.  There has never been any abdominal discomfort or bloating.  She does describe having some hematochezia at times.  This is only occurred when she has had the quite severe constipation.  She did see her primary care doctor and had mentioned this and they asked that she see GI and colorectal surgery for this.  She did have normal thyroid function testing and normal CBC and A1c over the summer.  She has no history of abdominal surgeries beyond  section x2.  No pertinent family history of digestive diseases.  No tobacco, alcohol, or drug use.    ROS:    10 point ROS neg other than the symptoms noted above in the HPI.    PREVIOUS ENDOSCOPY:  None    PERTINENT RELEVANT IMAGING OR LABS:  Reviewed    ALLERGIES:   No Known Allergies    PERTINENT MEDICATIONS:    Current Outpatient Medications:      polyethylene glycol (MIRALAX) 17 GM/Dose powder, Take 17 g (1 capful) by mouth 2 times daily, Disp: 578 g, Rfl: 11    PROBLEM LIST  Patient Active Problem List    Diagnosis Date Noted      delivery delivered 2021     Priority: Medium     Encounter for triage in pregnant patient 10/28/2021     Priority: Medium     Status post  section 2020     Priority: Medium     Hyperlipidemia LDL goal <160 03/15/2019     Priority: Medium     Impaired fasting glucose 03/15/2019     Priority: Medium     Melasma 2019     Priority: Medium     Family history of diabetes mellitus in mother 2019     Priority: Medium     Abnormal chromosomal and genetic finding on  screening mother 05/10/2018     Priority: Medium     Formatting of this note might be different from the original.  NIPT high risk for trisomy 21         PERTINENT PAST MEDICAL HISTORY:  Past Medical History:   Diagnosis Date     Down syndrome in child of prior pregnancy, currently pregnant  9/10/2019       PREVIOUS SURGERIES:  Past Surgical History:   Procedure Laterality Date      SECTION        SECTION N/A 2020    Procedure:  SECTION;  Surgeon: Rajni Meraz MD;  Location: [facility]      SECTION N/A 2021    Procedure:  SECTION;  Surgeon: Rajni Meraz MD;  Location: [facility]       SOCIAL HISTORY:  Social History     Socioeconomic History     Marital status:      Spouse name: Not on file     Number of children: Not on file     Years of education: Not on file     Highest education level: Not on file   Occupational History     Not on file   Tobacco Use     Smoking status: Never     Smokeless tobacco: Never   Vaping Use     Vaping Use: Never used   Substance and Sexual Activity     Alcohol use: No     Drug use: No     Sexual activity: Yes     Partners: Male     Birth control/protection: I.U.D.   Other Topics Concern     Not on file   Social History Narrative     Not on file     Social Determinants of Health     Financial Resource Strain: Not on file   Food Insecurity: Not on file   Transportation Needs: Not on file   Physical Activity: Not on file   Stress: Not on file   Social Connections: Not on file   Intimate Partner Violence: Not on file   Housing Stability: Not on file       FAMILY HISTORY:  Family History   Problem Relation Age of Onset     Diabetes Mother 50     No Known Problems Father      No Known Problems Brother      No Known Problems Sister      No Known Problems Brother      No Known Problems Brother      No Known Problems Brother      No Known Problems Sister      Breast Cancer No family hx of      Heart Disease No family hx of        Past/family/social history reviewed and no changes    PHYSICAL EXAMINATION:  Constitutional: aaox3, cooperative, pleasant, not dyspneic/diaphoretic, no acute distress  Vitals reviewed: /75 (BP Location: Left arm, Patient Position: Sitting, Cuff Size: Adult Regular)   Pulse 75   Ht 1.549 m  "(5' 1\")   Wt 61.2 kg (134 lb 14.4 oz)   SpO2 100%   BMI 25.49 kg/m    Wt:   Wt Readings from Last 2 Encounters:   03/14/23 61.2 kg (134 lb 14.4 oz)   02/06/23 63 kg (139 lb)      Eyes: Sclera anicteric/injected  CV: No edema  Respiratory: Unlabored breathing  Abd: Nondistended, +bs, no hepatosplenomegaly, nontender, no peritoneal signs  Skin: warm, perfused, no jaundice  Psych: Normal affect  MSK: Normal gait                    "

## 2023-03-14 NOTE — LETTER
3/14/2023         RE: Thea Rodriguez  [address]  [place] [address]        Dear Colleague,    Thank you for referring your patient, Thea Rodriguez, to the [facility]. Please see a copy of my visit note below.    NEW PATIENT GI CLINIC VISIT    CC/REFERRING MD:  Carter Wheeler  REASON FOR CONSULTATION:   Carter Wheeler for   Chief Complaint   Patient presents with     New Patient     New consult for chronic constipation and rectal bleeding.       ASSESSMENT/PLAN: Patient is here for evaluation of ongoing constipation as well as known external hemorrhoid.  She states that she has been getting pretty adequate relief with daily use of MiraLAX.  I think this would be reasonable to continue.  We did discuss alternative options, such as a secretagogue.  She is comfortable continuing with MiraLAX.  She has known external hemorrhoid and likely has some internal hemorrhoids based on her history of occasional hematochezia when constipation is severe.  She is seeing colorectal provider next month, they can more properly assess for hemorrhoids and provide treatment if needed.  We reviewed conservative treatments for hemorrhoids that she can continue to pursue.  No indication for colonoscopy at this time.  Can follow-up with me as needed.      RTC PRN    Thank you for this consultation.  It was a pleasure to participate in the care of this patient; please contact us with any further questions.      30 minutes spent on the date of the encounter doing chart review, patient visit and documentation    This note was created with voice recognition software, and while reviewed for accuracy, typos may remain.     Kaleb Montaño PA-C  Division of Gastroenterology, Hepatology and Nutrition  [facility]  Thea Rodriguez is a 37 year old female that is seen as a new patient in the GI clinic today for constipation.  This problem began shortly  after the birth of her last child little over a year ago.  She describes developing hard stool that was difficult to pass.  She was having a bowel movement roughly every 3 days and occasionally needing to manually disimpact the rectal vault with her fingers to pass stool.  She then started using MiraLAX on a daily basis and is now having [place] stool chart 3 stools that are relatively easy to pass.  There has never been any abdominal discomfort or bloating.  She does describe having some hematochezia at times.  This is only occurred when she has had the quite severe constipation.  She did see her primary care doctor and had mentioned this and they asked that she see GI and colorectal surgery for this.  She did have normal thyroid function testing and normal CBC and A1c over the summer.  She has no history of abdominal surgeries beyond  section x2.  No pertinent family history of digestive diseases.  No tobacco, alcohol, or drug use.    ROS:    10 point ROS neg other than the symptoms noted above in the HPI.    PREVIOUS ENDOSCOPY:  None    PERTINENT RELEVANT IMAGING OR LABS:  Reviewed    ALLERGIES:   No Known Allergies    PERTINENT MEDICATIONS:    Current Outpatient Medications:      polyethylene glycol (MIRALAX) 17 GM/Dose powder, Take 17 g (1 capful) by mouth 2 times daily, Disp: 578 g, Rfl: 11    PROBLEM LIST  Patient Active Problem List    Diagnosis Date Noted      delivery delivered 2021     Priority: Medium     Encounter for triage in pregnant patient 10/28/2021     Priority: Medium     Status post  section 2020     Priority: Medium     Hyperlipidemia LDL goal <160 03/15/2019     Priority: Medium     Impaired fasting glucose 03/15/2019     Priority: Medium     Melasma 2019     Priority: Medium     Family history of diabetes mellitus in mother 2019     Priority: Medium     Abnormal chromosomal and genetic finding on  screening mother 05/10/2018      Priority: Medium     Formatting of this note might be different from the original.  NIPT high risk for trisomy 21         PERTINENT PAST MEDICAL HISTORY:  Past Medical History:   Diagnosis Date     Down syndrome in child of prior pregnancy, currently pregnant 9/10/2019       PREVIOUS SURGERIES:  Past Surgical History:   Procedure Laterality Date      SECTION        SECTION N/A 2020    Procedure:  SECTION;  Surgeon: Rajni Meraz MD;  Location: [facility]      SECTION N/A 2021    Procedure:  SECTION;  Surgeon: Rajni Meraz MD;  Location: [facility]       SOCIAL HISTORY:  Social History     Socioeconomic History     Marital status:      Spouse name: Not on file     Number of children: Not on file     Years of education: Not on file     Highest education level: Not on file   Occupational History     Not on file   Tobacco Use     Smoking status: Never     Smokeless tobacco: Never   Vaping Use     Vaping Use: Never used   Substance and Sexual Activity     Alcohol use: No     Drug use: No     Sexual activity: Yes     Partners: Male     Birth control/protection: I.U.D.   Other Topics Concern     Not on file   Social History Narrative     Not on file     Social Determinants of Health     Financial Resource Strain: Not on file   Food Insecurity: Not on file   Transportation Needs: Not on file   Physical Activity: Not on file   Stress: Not on file   Social Connections: Not on file   Intimate Partner Violence: Not on file   Housing Stability: Not on file       FAMILY HISTORY:  Family History   Problem Relation Age of Onset     Diabetes Mother 50     No Known Problems Father      No Known Problems Brother      No Known Problems Sister      No Known Problems Brother      No Known Problems Brother      No Known Problems Brother      No Known Problems Sister      Breast Cancer No family hx of      Heart Disease No family hx of        Past/family/social history reviewed  "and no changes    PHYSICAL EXAMINATION:  Constitutional: aaox3, cooperative, pleasant, not dyspneic/diaphoretic, no acute distress  Vitals reviewed: /75 (BP Location: Left arm, Patient Position: Sitting, Cuff Size: Adult Regular)   Pulse 75   Ht 1.549 m (5' 1\")   Wt 61.2 kg (134 lb 14.4 oz)   SpO2 100%   BMI 25.49 kg/m    Wt:   Wt Readings from Last 2 Encounters:   03/14/23 61.2 kg (134 lb 14.4 oz)   02/06/23 63 kg (139 lb)      Eyes: Sclera anicteric/injected  CV: No edema  Respiratory: Unlabored breathing  Abd: Nondistended, +bs, no hepatosplenomegaly, nontender, no peritoneal signs  Skin: warm, perfused, no jaundice  Psych: Normal affect  MSK: Normal gait                        Again, thank you for allowing me to participate in the care of your patient.        Sincerely,        Kaleb Montaño PA-C    "

## 2023-04-24 ENCOUNTER — OFFICE VISIT (OUTPATIENT)
Dept: SURGERY | Facility: CLINIC | Age: 38
End: 2023-04-24
Payer: COMMERCIAL

## 2023-04-24 ENCOUNTER — PRE VISIT (OUTPATIENT)
Dept: SURGERY | Facility: CLINIC | Age: 38
End: 2023-04-24

## 2023-04-24 VITALS
SYSTOLIC BLOOD PRESSURE: 124 MMHG | BODY MASS INDEX: 25.49 KG/M2 | DIASTOLIC BLOOD PRESSURE: 82 MMHG | HEIGHT: 61 IN | OXYGEN SATURATION: 100 % | HEART RATE: 85 BPM

## 2023-04-24 DIAGNOSIS — K60.2 ANAL FISSURE: Primary | ICD-10-CM

## 2023-04-24 PROCEDURE — 99203 OFFICE O/P NEW LOW 30 MIN: CPT | Performed by: NURSE PRACTITIONER

## 2023-04-24 NOTE — NURSING NOTE
"Chief Complaint   Patient presents with     New Patient     Hemorrhoids       Vitals:    04/24/23 1010   BP: 124/82   BP Location: Left arm   Patient Position: Sitting   Cuff Size: Adult Regular   Pulse: 85   SpO2: 100%   Height: 5' 1\"       Body mass index is 25.49 kg/m .     Bill De Leon, EMT- P    "

## 2023-04-24 NOTE — PROGRESS NOTES
"Colon and Rectal Surgery Consult Clinic Note    Date: 2023     Referring provider:  No referring provider defined for this encounter.     RE: Thea Rodriguez  : 1985  RAEANN: 2023    Thea Rodriguez is a very pleasant 38 year old female here for hemorrhoids. Visit was conducted with  via tablet.    HPI:  Having pain with bowel movements for about a year. Pain is sharp. Having some blood with straining with bowel movements. Having constipation since having a baby last December. Not breastfeeding. Taking Miralax for constipation and that is helping. She takes this every few days. Had some bulging tissue with harder stools that reduced on its own. It still sticks out sometimes with bowel movements. No fecal incontinence.   Has had 3 c-sections and no vaginal births.  No family history of colon cancer or IBD. Has never had a colonoscopy.    Physical Examination:  /82 (BP Location: Left arm, Patient Position: Sitting, Cuff Size: Adult Regular)   Pulse 85   Ht 5' 1\"   SpO2 100%   BMI 25.49 kg/m    General: alert, oriented, in no acute distress, sitting comfortably  HEENT: mucous membranes moist    Perianal external examination: Exam was chaperoned by Bill De Leon EMT-P   Perianal skin: Intact with no excoriation or lichenification.  Lesions: No evidence of an external lesion, nodularity, or induration in the perianal region.  Eversion of buttocks: There was evidence of an anal fissure. Details: anterior midline anal fissure with small skin tag present.    Digital rectal examination: Was deferred in order not to cause further trauma    Anoscopy: Was deferred in order not to cause further trauma    Assessment/Plan: 38 year old female with anal fissure. I discussed that this is likely the source of the pain and bleeding. Discussed the importance of keeping stools soft and easy to pass while healing fissure.  Recommended starting on a daily fiber supplement and can stay on Miralax in " addition as needed.  Will treat with topical diltiazem with follow up in 8 weeks. Discussed that it may take several weeks for symptoms to improve. If no improvement is noted after 4 weeks, return to clinic and discuss further intervention such as Botox injections.  Advised the use of Tylenol, ibuprofen, and warm tub baths for any pain.  If bleeding persist despite treatment of fissure, would recommend a colonoscopy. Patient's questions were answered to her stated satisfaction and she is in agreement with this plan.     Medical history:  Past Medical History:   Diagnosis Date     Down syndrome in child of prior pregnancy, currently pregnant 9/10/2019       Surgical history:  Past Surgical History:   Procedure Laterality Date      SECTION        SECTION N/A 2020    Procedure:  SECTION;  Surgeon: Rajni Meraz MD;  Location: [facility]      SECTION N/A 2021    Procedure:  SECTION;  Surgeon: Rajni Meraz MD;  Location: [facility]       Problem list:  Patient Active Problem List    Diagnosis Date Noted      delivery delivered 2021     Priority: Medium     Encounter for triage in pregnant patient 10/28/2021     Priority: Medium     Status post  section 2020     Priority: Medium     Hyperlipidemia LDL goal <160 03/15/2019     Priority: Medium     Impaired fasting glucose 03/15/2019     Priority: Medium     Melasma 2019     Priority: Medium     Family history of diabetes mellitus in mother 2019     Priority: Medium     Abnormal chromosomal and genetic finding on  screening mother 05/10/2018     Priority: Medium     Formatting of this note might be different from the original.  NIPT high risk for trisomy 21         Medications:  Current Outpatient Medications   Medication Sig Dispense Refill     polyethylene glycol (MIRALAX) 17 GM/Dose powder Take 17 g (1 capful) by mouth 2 times daily 578 g 11       Allergies:  No Known  "Allergies    Family history:  Family History   Problem Relation Age of Onset     Diabetes Mother 50     No Known Problems Father      No Known Problems Brother      No Known Problems Sister      No Known Problems Brother      No Known Problems Brother      No Known Problems Brother      No Known Problems Sister      Breast Cancer No family hx of      Heart Disease No family hx of        Social history:  Social History     Tobacco Use     Smoking status: Never     Smokeless tobacco: Never   Vaping Use     Vaping status: Never Used     Passive vaping exposure: Yes   Substance Use Topics     Alcohol use: No    Marital status: .    Nursing Notes:   Bill De Leon, EMT  4/24/2023 10:16 AM  Signed  Chief Complaint   Patient presents with     New Patient     Hemorrhoids       Vitals:    04/24/23 1010   BP: 124/82   BP Location: Left arm   Patient Position: Sitting   Cuff Size: Adult Regular   Pulse: 85   SpO2: 100%   Height: 5' 1\"       Body mass index is 25.49 kg/m .     Bill De Leon, EMT- P         30 minutes spent on the date of encounter performing chart review, history and exam, documentation and further activities as noted above.    KADEN Enrique, NP-C  Colon and Rectal Surgery   [facility]    This note was created using speech recognition software and may contain unintended word substitutions.    "

## 2023-04-24 NOTE — LETTER
"2023       RE: Thea Rodriguez  [address]  [place] [address]       Dear Colleague,    Thank you for referring your patient, Thea Rodriguez, to the [facility] [place] [facility]. Please see a copy of my visit note below.    Colon and Rectal Surgery Consult Clinic Note    Date: 2023     Referring provider:  No referring provider defined for this encounter.     RE: Thea Rodriguez  : 1985  RAEANN: 2023    Thea Rodriguez is a very pleasant 38 year old female here for hemorrhoids. Visit was conducted with  via tablet.    HPI:  Having pain with bowel movements for about a year. Pain is sharp. Having some blood with straining with bowel movements. Having constipation since having a baby last December. Not breastfeeding. Taking Miralax for constipation and that is helping. She takes this every few days. Had some bulging tissue with harder stools that reduced on its own. It still sticks out sometimes with bowel movements. No fecal incontinence.   Has had 3 c-sections and no vaginal births.  No family history of colon cancer or IBD. Has never had a colonoscopy.    Physical Examination:  /82 (BP Location: Left arm, Patient Position: Sitting, Cuff Size: Adult Regular)   Pulse 85   Ht 5' 1\"   SpO2 100%   BMI 25.49 kg/m    General: alert, oriented, in no acute distress, sitting comfortably  HEENT: mucous membranes moist    Perianal external examination: Exam was chaperoned by Bill De Leon, EMT-P   Perianal skin: Intact with no excoriation or lichenification.  Lesions: No evidence of an external lesion, nodularity, or induration in the perianal region.  Eversion of buttocks: There was evidence of an anal fissure. Details: anterior midline anal fissure with small skin tag present.    Digital rectal examination: Was deferred in order not to cause further trauma    Anoscopy: Was " deferred in order not to cause further trauma    Assessment/Plan: 38 year old female with anal fissure. I discussed that this is likely the source of the pain and bleeding. Discussed the importance of keeping stools soft and easy to pass while healing fissure.  Recommended starting on a daily fiber supplement and can stay on Miralax in addition as needed.  Will treat with topical diltiazem with follow up in 8 weeks. Discussed that it may take several weeks for symptoms to improve. If no improvement is noted after 4 weeks, return to clinic and discuss further intervention such as Botox injections.  Advised the use of Tylenol, ibuprofen, and warm tub baths for any pain.  If bleeding persist despite treatment of fissure, would recommend a colonoscopy. Patient's questions were answered to her stated satisfaction and she is in agreement with this plan.     Medical history:  Past Medical History:   Diagnosis Date    Down syndrome in child of prior pregnancy, currently pregnant 9/10/2019       Surgical history:  Past Surgical History:   Procedure Laterality Date     SECTION       SECTION N/A 2020    Procedure:  SECTION;  Surgeon: Rajni Meraz MD;  [facility]     SECTION N/A 2021    Procedure:  SECTION;  Surgeon: Rajni Meraz MD;  [facility]: [facility]       Problem list:  Patient Active Problem List    Diagnosis Date Noted     delivery delivered 2021     Priority: Medium    Encounter for triage in pregnant patient 10/28/2021     Priority: Medium    Status post  section 2020     Priority: Medium    Hyperlipidemia LDL goal <160 03/15/2019     Priority: Medium    Impaired fasting glucose 03/15/2019     Priority: Medium    Melasma 2019     Priority: Medium    Family history of diabetes mellitus in mother 2019     Priority: Medium    Abnormal chromosomal and genetic finding on  screening mother 05/10/2018      "Priority: Medium     Formatting of this note might be different from the original.  NIPT high risk for trisomy 21         Medications:  Current Outpatient Medications   Medication Sig Dispense Refill    polyethylene glycol (MIRALAX) 17 GM/Dose powder Take 17 g (1 capful) by mouth 2 times daily 578 g 11       Allergies:  No Known Allergies    Family history:  Family History   Problem Relation Age of Onset    Diabetes Mother 50    No Known Problems Father     No Known Problems Brother     No Known Problems Sister     No Known Problems Brother     No Known Problems Brother     No Known Problems Brother     No Known Problems Sister     Breast Cancer No family hx of     Heart Disease No family hx of        Social history:  Social History     Tobacco Use    Smoking status: Never    Smokeless tobacco: Never   Vaping Use    Vaping status: Never Used     Passive vaping exposure: Yes   Substance Use Topics    Alcohol use: No    Marital status: .    Nursing Notes:   Bill De Leon, EMT  4/24/2023 10:16 AM  Signed  Chief Complaint   Patient presents with    New Patient    Hemorrhoids       Vitals:    04/24/23 1010   BP: 124/82   BP Location: Left arm   Patient Position: Sitting   Cuff Size: Adult Regular   Pulse: 85   SpO2: 100%   Height: 5' 1\"       Body mass index is 25.49 kg/m .     Bill De Leon, EMT- P    30 minutes spent on the date of encounter performing chart review, history and exam, documentation and further activities as noted above.    This note was created using speech recognition software and may contain unintended word substitutions.        Again, thank you for allowing me to participate in the care of your patient.      Sincerely,    KADEN Torres CNP      "

## 2023-05-07 ENCOUNTER — HEALTH MAINTENANCE LETTER (OUTPATIENT)
Age: 38
End: 2023-05-07

## 2023-06-12 ENCOUNTER — OFFICE VISIT (OUTPATIENT)
Dept: FAMILY MEDICINE | Facility: CLINIC | Age: 38
End: 2023-06-12
Payer: COMMERCIAL

## 2023-06-12 VITALS
TEMPERATURE: 98.3 F | DIASTOLIC BLOOD PRESSURE: 72 MMHG | BODY MASS INDEX: 24 KG/M2 | HEART RATE: 72 BPM | RESPIRATION RATE: 12 BRPM | WEIGHT: 127 LBS | SYSTOLIC BLOOD PRESSURE: 106 MMHG | OXYGEN SATURATION: 99 %

## 2023-06-12 DIAGNOSIS — M62.830 SPASM OF BACK MUSCLES: ICD-10-CM

## 2023-06-12 DIAGNOSIS — G24.3 SPASMODIC TORTICOLLIS: Primary | ICD-10-CM

## 2023-06-12 PROCEDURE — 99214 OFFICE O/P EST MOD 30 MIN: CPT | Performed by: PHYSICIAN ASSISTANT

## 2023-06-12 RX ORDER — NAPROXEN 500 MG/1
500 TABLET ORAL
Qty: 30 TABLET | Refills: 0 | Status: SHIPPED | OUTPATIENT
Start: 2023-06-12 | End: 2023-10-31

## 2023-06-12 RX ORDER — METHOCARBAMOL 750 MG/1
750 TABLET, FILM COATED ORAL 3 TIMES DAILY
Qty: 30 TABLET | Refills: 0 | Status: SHIPPED | OUTPATIENT
Start: 2023-06-12 | End: 2023-10-31

## 2023-06-12 NOTE — LETTER
June 12, 2023      Thea Rodriguez  [address] [place]   [place] [address]        To Whom It May Concern:    Thea Rodriguez was seen in clinic today.  She may return to work on Wednesday.          Sincerely,        Marilu Schrader PA-C

## 2023-06-12 NOTE — PROGRESS NOTES
Patient presents with:  Shoulder Pain: And neck pain, unable to move neck, both side of neck, limited with movement of neck, x more than 1wk, more thank on Lt side of neck that travels down to back, no known injury, did do some lifting with Rt hand couple days ago    (G24.3) Spasmodic torticollis  (primary encounter diagnosis)  Comment:   Plan: naproxen (NAPROSYN) 500 MG tablet,         methocarbamol (ROBAXIN) 750 MG tablet            (M62.830) Spasm of back muscles  Comment:   Plan: naproxen (NAPROSYN) 500 MG tablet,         methocarbamol (ROBAXIN) 750 MG tablet          You may use heat to affected area over a thin cloth (like a t-shirt) for 10-15 minutes, then do some gentle stretches followed by ice to the area over a thin cloth for 15 minutes 2-3 times a day.      See handout.        If not improving or if condition worsens, follow up with your Primary Care Provider      31 minutes spent by me on the date of the encounter doing chart review, patient visit, documentation and communication via telephone Kiswahili        SUBJECTIVE:   Thea Rodriguez is a 38 year old female who presents today with right sided neck pain and upper back pain, onset about a week ago.      Pain with movement of head and upper back and upper arms.      Denies any fevers.      Was lifting and pushing something heavy at work last week.  Symptoms started thereafter.        Past Medical History:   Diagnosis Date     Down syndrome in child of prior pregnancy, currently pregnant 9/10/2019         Current Outpatient Medications   Medication Sig Dispense Refill     Multiple Vitamins-Iron (DAILY-NALLELY/IRON/BETA-CAROTENE) TABS TAKE 1 TABLET BY MOUTH DAILY. (Patient not taking: Reported on 10/19/2020) 30 tablet 7     Social History     Tobacco Use     Smoking status: Never Smoker     Smokeless tobacco: Never Used   Substance Use Topics     Alcohol use: Not on file     Family History   Problem Relation Age of Onset     Diabetes Mother       Diabetes Father          ROS:    10 point ROS of systems including Constitutional, Eyes, Respiratory, Cardiovascular, Gastroenterology, Genitourinary, Integumentary, Muscularskeletal, Psychiatric ,neurological were all negative except for pertinent positives noted in my HPI       OBJECTIVE:  /72   Pulse 72   Temp 98.3  F (36.8  C) (Oral)   Resp 12   Wt 57.6 kg (127 lb)   SpO2 99%   BMI 24.00 kg/m    Physical Exam:  GENERAL APPEARANCE: healthy, alert with mild distress secondary to pain.  Patient is holding her head tilted to the right.    EYES: EOMI,  PERRL, conjunctiva clear  HENT: ear canals and TM's normal.  Nose and mouth without ulcers, erythema or lesions  NECK: no lymphadenopathy.  Tenderness over paracervical spinous muscles on right as well as over right trapezius which has a palpable spasm  BACK: muscle spasm over lower trapezius just medial to right scapula  RESP: lungs clear to auscultation - no rales, rhonchi or wheezes  CV: regular rates and rhythm, normal S1 S2, no murmur noted  ABDOMEN:  soft, nontender, no HSM or masses and bowel sounds normal  NEURO: Normal strength and tone, sensory exam grossly normal,  normal speech and mentation  SKIN: no suspicious lesions or rashes

## 2023-06-12 NOTE — PATIENT INSTRUCTIONS
(G24.3) Spasmodic torticollis  (primary encounter diagnosis)  Comment:   Plan: naproxen (NAPROSYN) 500 MG tablet,         methocarbamol (ROBAXIN) 750 MG tablet            (M62.830) Spasm of back muscles  Comment:   Plan: naproxen (NAPROSYN) 500 MG tablet,         methocarbamol (ROBAXIN) 750 MG tablet          You may use heat to affected area over a thin cloth (like a t-shirt) for 10-15 minutes, then do some gentle stretches followed by ice to the area over a thin cloth for 15 minutes 2-3 times a day.      See handout.

## 2023-10-03 ENCOUNTER — OFFICE VISIT (OUTPATIENT)
Dept: FAMILY MEDICINE | Facility: CLINIC | Age: 38
End: 2023-10-03
Payer: COMMERCIAL

## 2023-10-03 VITALS
SYSTOLIC BLOOD PRESSURE: 110 MMHG | HEART RATE: 68 BPM | OXYGEN SATURATION: 100 % | DIASTOLIC BLOOD PRESSURE: 62 MMHG | BODY MASS INDEX: 23.6 KG/M2 | TEMPERATURE: 98 F | RESPIRATION RATE: 16 BRPM | HEIGHT: 61 IN | WEIGHT: 125 LBS

## 2023-10-03 DIAGNOSIS — E78.5 HYPERLIPIDEMIA LDL GOAL <160: ICD-10-CM

## 2023-10-03 DIAGNOSIS — Z83.3 FAMILY HISTORY OF DIABETES MELLITUS IN MOTHER: ICD-10-CM

## 2023-10-03 DIAGNOSIS — R73.01 IMPAIRED FASTING GLUCOSE: ICD-10-CM

## 2023-10-03 DIAGNOSIS — Z12.31 VISIT FOR SCREENING MAMMOGRAM: ICD-10-CM

## 2023-10-03 DIAGNOSIS — E55.9 VITAMIN D DEFICIENCY: ICD-10-CM

## 2023-10-03 DIAGNOSIS — Z00.00 ROUTINE HISTORY AND PHYSICAL EXAMINATION OF ADULT: Primary | ICD-10-CM

## 2023-10-03 PROBLEM — Z98.891 STATUS POST CESAREAN SECTION: Status: RESOLVED | Noted: 2020-02-12 | Resolved: 2023-10-03

## 2023-10-03 PROBLEM — Z36.89 ENCOUNTER FOR TRIAGE IN PREGNANT PATIENT: Status: RESOLVED | Noted: 2021-10-28 | Resolved: 2023-10-03

## 2023-10-03 LAB
ANION GAP SERPL CALCULATED.3IONS-SCNC: 10 MMOL/L (ref 7–15)
BUN SERPL-MCNC: 9.7 MG/DL (ref 6–20)
CALCIUM SERPL-MCNC: 9.6 MG/DL (ref 8.6–10)
CHLORIDE SERPL-SCNC: 104 MMOL/L (ref 98–107)
CHOLEST SERPL-MCNC: 215 MG/DL
CREAT SERPL-MCNC: 0.63 MG/DL (ref 0.51–0.95)
DEPRECATED HCO3 PLAS-SCNC: 25 MMOL/L (ref 22–29)
EGFRCR SERPLBLD CKD-EPI 2021: >90 ML/MIN/1.73M2
GLUCOSE SERPL-MCNC: 94 MG/DL (ref 70–99)
HBA1C MFR BLD: 5.6 % (ref 0–5.6)
HDLC SERPL-MCNC: 45 MG/DL
LDLC SERPL CALC-MCNC: 156 MG/DL
NONHDLC SERPL-MCNC: 170 MG/DL
POTASSIUM SERPL-SCNC: 4.2 MMOL/L (ref 3.4–5.3)
SODIUM SERPL-SCNC: 139 MMOL/L (ref 135–145)
TRIGL SERPL-MCNC: 69 MG/DL
VIT D+METAB SERPL-MCNC: 19 NG/ML (ref 20–50)

## 2023-10-03 PROCEDURE — 80061 LIPID PANEL: CPT | Performed by: NURSE PRACTITIONER

## 2023-10-03 PROCEDURE — 82306 VITAMIN D 25 HYDROXY: CPT | Performed by: NURSE PRACTITIONER

## 2023-10-03 PROCEDURE — 36415 COLL VENOUS BLD VENIPUNCTURE: CPT | Performed by: NURSE PRACTITIONER

## 2023-10-03 PROCEDURE — 80048 BASIC METABOLIC PNL TOTAL CA: CPT | Performed by: NURSE PRACTITIONER

## 2023-10-03 PROCEDURE — 83036 HEMOGLOBIN GLYCOSYLATED A1C: CPT | Performed by: NURSE PRACTITIONER

## 2023-10-03 PROCEDURE — 99396 PREV VISIT EST AGE 40-64: CPT | Performed by: NURSE PRACTITIONER

## 2023-10-03 ASSESSMENT — PAIN SCALES - GENERAL: PAINLEVEL: NO PAIN (0)

## 2023-10-03 NOTE — PROGRESS NOTES
SUBJECTIVE:   CC: Thea is an 38 year old who presents for preventive health visit.       Healthy Habits:     Getting at least 3 servings of Calcium per day:  NO    Bi-annual eye exam:  NO    Dental care twice a year:  NO    Sleep apnea or symptoms of sleep apnea:  None    Diet:  Regular (no restrictions)    Frequency of exercise:  None    Duration of exercise:  N/A    Taking medications regularly:  Not Applicable    Barriers to taking medications:  Not applicable    Medication side effects:  Not applicable    Additional concerns today:  No    Pain when she pushes over her left anterior chest.  Also hurts when she pulls.      Social History     Tobacco Use    Smoking status: Never    Smokeless tobacco: Never   Substance Use Topics    Alcohol use: No             2022     8:29 AM   Alcohol Use   Prescreen: >3 drinks/day or >7 drinks/week? No          No data to display              Reviewed orders with patient.  Reviewed health maintenance and updated orders accordingly - Yes  BP Readings from Last 3 Encounters:   10/03/23 110/62   23 106/72   23 124/82    Wt Readings from Last 3 Encounters:   10/03/23 56.7 kg (125 lb)   23 57.6 kg (127 lb)   23 61.2 kg (134 lb 14.4 oz)                  Patient Active Problem List   Diagnosis    Melasma    Family history of diabetes mellitus in mother    Hyperlipidemia LDL goal <160    Impaired fasting glucose    Abnormal chromosomal and genetic finding on  screening mother     Past Surgical History:   Procedure Laterality Date     SECTION       SECTION N/A 2020    Procedure:  SECTION;  Surgeon: Rajni Meraz MD;  [facility]     SECTION N/A 2021    Procedure:  SECTION;  Surgeon: Rajni Meraz MD;  [facility]       Social History     Tobacco Use    Smoking status: Never    Smokeless tobacco: Never   Substance Use Topics    Alcohol use: No     Family History   Problem  Relation Age of Onset    Diabetes Mother 50    No Known Problems Father     No Known Problems Brother     No Known Problems Sister     No Known Problems Brother     No Known Problems Brother     No Known Problems Brother     No Known Problems Sister     Breast Cancer No family hx of     Heart Disease No family hx of          Current Outpatient Medications   Medication Sig Dispense Refill    diltiazem 2% in PLO gel Apply small amount to anal opening three times daily for 8 weeks. (Patient not taking: Reported on 6/12/2023) 60 g 0    methocarbamol (ROBAXIN) 750 MG tablet Take 1 tablet (750 mg) by mouth 3 times daily (Patient not taking: Reported on 10/3/2023) 30 tablet 0    naproxen (NAPROSYN) 500 MG tablet Take 1 tablet (500 mg) by mouth 3 times daily (with meals) (Patient not taking: Reported on 10/3/2023) 30 tablet 0    polyethylene glycol (MIRALAX) 17 GM/Dose powder Take 17 g (1 capful) by mouth 2 times daily (Patient not taking: Reported on 6/12/2023) 578 g 11     No Known Allergies    Breast Cancer Screening:  Any new diagnosis of family breast, ovarian, or bowel cancer?     FHS-7:        No data to display                Mammogram Screening - Offered annual screening and updated Health Maintenance for mutual plan based on risk factor consideration  Pertinent mammograms are reviewed under the imaging tab.    History of abnormal Pap smear: NO - age 30-65 PAP every 5 years with negative HPV co-testing recommended      Latest Ref Rng & Units 3/3/2021     9:35 AM 3/3/2021     9:30 AM   PAP / HPV   PAP (Historical)  NIL     HPV 16 DNA NEG^Negative  Negative    HPV 18 DNA NEG^Negative  Negative    Other HR HPV NEG^Negative  Negative      Reviewed and updated as needed this visit by clinical staff   Tobacco  Allergies  Meds  Problems  Med Hx  Surg Hx  Fam Hx          Reviewed and updated as needed this visit by Provider     Meds  Problems  Med Hx  Surg Hx  Fam Hx             Review of  "Systems  CONSTITUTIONAL: NEGATIVE for fever, chills, change in weight  INTEGUMENTARU/SKIN: NEGATIVE for worrisome rashes, moles or lesions  EYES: NEGATIVE for vision changes or irritation  ENT: NEGATIVE for ear, mouth and throat problems  RESP: NEGATIVE for significant cough or SOB  BREAST: NEGATIVE for masses, tenderness or discharge  CV: NEGATIVE for chest pain, palpitations or peripheral edema  GI: NEGATIVE for nausea, abdominal pain, heartburn, or change in bowel habits  : NEGATIVE for unusual urinary or vaginal symptoms. Periods are regular.  MUSCULOSKELETAL: NEGATIVE for significant arthralgias or myalgia  NEURO: NEGATIVE for weakness, dizziness or paresthesias  PSYCHIATRIC: NEGATIVE for changes in mood or affect     OBJECTIVE:   /62 (BP Location: Right arm, Patient Position: Sitting, Cuff Size: Adult Regular)   Pulse 68   Temp 98  F (36.7  C) (Oral)   Resp 16   Ht 1.549 m (5' 1\")   Wt 56.7 kg (125 lb)   LMP 09/26/2023   SpO2 100%   BMI 23.62 kg/m    Physical Exam  GENERAL: healthy, alert and no distress  EYES: Eyes grossly normal to inspection, PERRL and conjunctivae and sclerae normal  HENT: ear canals and TM's normal, nose and mouth without ulcers or lesions  NECK: no adenopathy, no asymmetry, masses, or scars and thyroid normal to palpation  RESP: lungs clear to auscultation - no rales, rhonchi or wheezes  BREAST: normal without masses, tenderness or nipple discharge and no palpable axillary masses or adenopathy  CV: regular rate and rhythm, normal S1 S2, no S3 or S4, no murmur, click or rub, no peripheral edema and peripheral pulses strong  ABDOMEN: soft, nontender, no hepatosplenomegaly, no masses and bowel sounds normal  MS: Tenderness to left chest with palpation  SKIN: no suspicious lesions or rashes  NEURO: Normal strength and tone, mentation intact and speech normal  PSYCH: mentation appears normal, affect normal/bright    Diagnostic Test Results:  Labs reviewed in Epic  Results for " "orders placed or performed in visit on 10/03/23 (from the past 24 hour(s))   Hemoglobin A1c   Result Value Ref Range    Hemoglobin A1C 5.6 0.0 - 5.6 %       ASSESSMENT/PLAN:   Thea was seen today for physical.    Diagnoses and all orders for this visit:    Routine history and physical examination of adult  -     Vitamin D Deficiency; Future  -     Vitamin D Deficiency    Impaired fasting glucose  -     Hemoglobin A1c; Future  -     Basic metabolic panel  (Ca, Cl, CO2, Creat, Gluc, K, Na, BUN); Future  -     Hemoglobin A1c  -     Basic metabolic panel  (Ca, Cl, CO2, Creat, Gluc, K, Na, BUN)    Hyperlipidemia LDL goal <160  -     Lipid panel reflex to direct LDL Fasting; Future  -     Lipid panel reflex to direct LDL Fasting    Family history of diabetes mellitus in mother    Visit for screening mammogram  -     MA Screen Bilateral w/Oliver; Future      COUNSELING:  Reviewed preventive health counseling, as reflected in patient instructions       Regular exercise       Healthy diet/nutrition      BMI:   Estimated body mass index is 23.62 kg/m  as calculated from the following:    Height as of this encounter: 1.549 m (5' 1\").    Weight as of this encounter: 56.7 kg (125 lb).         She reports that she has never smoked. She has never used smokeless tobacco.          Vi Castaneda NP  [facility]  "

## 2023-10-08 PROBLEM — E55.9 VITAMIN D DEFICIENCY: Status: ACTIVE | Noted: 2023-10-08

## 2023-10-09 DIAGNOSIS — K59.00 CONSTIPATION, UNSPECIFIED CONSTIPATION TYPE: ICD-10-CM

## 2023-10-10 RX ORDER — POLYETHYLENE GLYCOL 3350 17 G/17G
POWDER, FOR SOLUTION ORAL
Qty: 510 G | Refills: 0 | Status: SHIPPED | OUTPATIENT
Start: 2023-10-10 | End: 2023-10-31

## 2023-10-17 ENCOUNTER — ANCILLARY PROCEDURE (OUTPATIENT)
Dept: MAMMOGRAPHY | Facility: HOSPITAL | Age: 38
End: 2023-10-17
Attending: NURSE PRACTITIONER
Payer: COMMERCIAL

## 2023-10-17 DIAGNOSIS — Z12.31 VISIT FOR SCREENING MAMMOGRAM: ICD-10-CM

## 2023-10-17 PROCEDURE — 77067 SCR MAMMO BI INCL CAD: CPT

## 2023-10-31 ENCOUNTER — OFFICE VISIT (OUTPATIENT)
Dept: GASTROENTEROLOGY | Facility: CLINIC | Age: 38
End: 2023-10-31
Attending: PHYSICIAN ASSISTANT
Payer: COMMERCIAL

## 2023-10-31 VITALS
BODY MASS INDEX: 23.9 KG/M2 | SYSTOLIC BLOOD PRESSURE: 105 MMHG | HEART RATE: 84 BPM | OXYGEN SATURATION: 100 % | HEIGHT: 61 IN | DIASTOLIC BLOOD PRESSURE: 73 MMHG | WEIGHT: 126.6 LBS

## 2023-10-31 DIAGNOSIS — K59.00 CONSTIPATION, UNSPECIFIED CONSTIPATION TYPE: Primary | ICD-10-CM

## 2023-10-31 DIAGNOSIS — K92.1 HEMATOCHEZIA: ICD-10-CM

## 2023-10-31 PROCEDURE — 99213 OFFICE O/P EST LOW 20 MIN: CPT | Performed by: PHYSICIAN ASSISTANT

## 2023-10-31 RX ORDER — POLYETHYLENE GLYCOL 3350 17 G/17G
POWDER, FOR SOLUTION ORAL
Qty: 510 G | Refills: 4 | Status: SHIPPED | OUTPATIENT
Start: 2023-10-31

## 2023-10-31 ASSESSMENT — PAIN SCALES - GENERAL: PAINLEVEL: NO PAIN (0)

## 2023-10-31 NOTE — LETTER
10/31/2023         RE: Thea Rodriguez  [address]  [facility] [address]        Dear Colleague,    Thank you for referring your patient, Thea Rodriguez, to the [facility]. Please see a copy of my visit note below.    FOLLOW UP GI CLINIC VISIT    CC/REFERRING MD:  Kaleb Montaño  REASON FOR CONSULTATION:   Kaleb Montaño for   Chief Complaint   Patient presents with     Follow Up     7 month follow up for constipation.       ASSESSMENT/PLAN: Patient here for follow-up of ongoing constipation.  She initially was doing all right with MiraLAX, but it seems to be a little bit less effective now.  She has continued to have some intermittent hematochezia.  She was diagnosed with anal fissure earlier this year and was treated with topical diltiazem, it sounds like that has resolved, but she is still seeing some bright red blood intermittently.  At this point, I think pursuing colonoscopy is reasonable to further investigate hematochezia and this persistent change to her stool pattern.  She stated understanding of plan and we will follow-up with her after colonoscopy is completed.  For now, she can try increasing the MiraLAX to twice daily to improve stool output.      RTC 3 months    Thank you for this consultation.  It was a pleasure to participate in the care of this patient; please contact us with any further questions.      15 minutes spent on the date of the encounter doing chart review, patient visit, and documentation    This note was created with voice recognition software, and while reviewed for accuracy, typos may remain.     Kaleb Montaño PA-C  Division of Gastroenterology, Hepatology and Nutrition  [facility]  Thea Rodriguez is a 38 year old female that is seen for follow up in the GI clinic today for follow-up of constipation.  She is accompanied by her  and we also had an German  over the phone.   I initially met with patient about 7 months ago.  At that time, she was describing passing hard stool every 3 days or so and needing to manually disimpact the rectal vault with her fingers to pass stool.  This problem had occurred shortly after the birth of her last child.  She had described having hematochezia intermittently.  Shortly before seeing me, she started using MiraLAX daily and she was having better stool output, had also been referred to colorectal surgery but had not seen them yet.    When I saw her, she had been fairly pleased with the MiraLAX and wanted to continue using it.  We had discussed other options such as Linzess or Amitiza, but she felt comfortable proceeding with MiraLAX and seeing colorectal surgery.    At her visit with colorectal surgery, she was diagnosed with an anal fissure and given some topical diltiazem.  It sounds like that resolved with topical treatment.    She follows up today stating that she has lost a little bit of response to the MiraLAX, now having a little bit firmer BM every 2 to 3 days.  She has continued to have some intermittent hematochezia, but no rectal pain.  There are otherwise no new symptoms going on.  She is hopeful that we can find a potential cause or source for her change in bowel movements, that she does not want to rely on taking laxatives every day.    ROS:    10 point ROS neg other than the symptoms noted above in the HPI.    PREVIOUS ENDOSCOPY:  None    PERTINENT RELEVANT IMAGING OR LABS:  Reviewed    ALLERGIES:   No Known Allergies    PERTINENT MEDICATIONS:    Current Outpatient Medications:      polyethylene glycol (MIRALAX) 17 GM/Dose powder, MIX AND DRINK 17 GRAMS BY MOUTH TWICE DAILY, Disp: 510 g, Rfl: 4     vitamin D3 (CHOLECALCIFEROL) 125 MCG (5000 UT) tablet, Take 125 mcg by mouth daily, Disp: , Rfl:     PROBLEM LIST  Patient Active Problem List    Diagnosis Date Noted     Vitamin D deficiency 10/08/2023     Priority: Medium     Hyperlipidemia  LDL goal <160 03/15/2019     Priority: Medium     Impaired fasting glucose 03/15/2019     Priority: Medium     Melasma 2019     Priority: Medium     Family history of diabetes mellitus in mother 2019     Priority: Medium     Abnormal chromosomal and genetic finding on  screening mother 05/10/2018     Priority: Medium     Formatting of this note might be different from the original.  NIPT high risk for trisomy 21         PERTINENT PAST MEDICAL HISTORY:  Past Medical History:   Diagnosis Date     Down syndrome in child of prior pregnancy, currently pregnant 09/10/2019     Vitamin D deficiency 10/08/2023       PREVIOUS SURGERIES:  Past Surgical History:   Procedure Laterality Date      SECTION        SECTION N/A 2020    Procedure:  SECTION;  Surgeon: Rajni Meraz MD;  Location: [facility]      SECTION N/A 2021    Procedure:  SECTION;  Surgeon: Rajni Meraz MD;  Location: [facility]       SOCIAL HISTORY:  Social History     Socioeconomic History     Marital status:      Spouse name: Not on file     Number of children: Not on file     Years of education: Not on file     Highest education level: Not on file   Occupational History     Not on file   Tobacco Use     Smoking status: Never     Smokeless tobacco: Never   Vaping Use     Vaping Use: Never used   Substance and Sexual Activity     Alcohol use: No     Drug use: No     Sexual activity: Yes     Partners: Male     Birth control/protection: I.U.D.   Other Topics Concern     Not on file   Social History Narrative     Not on file     Social Determinants of Health     Financial Resource Strain: Not on file   Food Insecurity: Not on file   Transportation Needs: Not on file   Physical Activity: Not on file   Stress: Not on file   Social Connections: Not on file   Interpersonal Safety: Low Risk  (10/3/2023)    Interpersonal Safety      Do you feel physically and emotionally safe where you  "currently live?: Yes      Within the past 12 months, have you been hit, slapped, kicked or otherwise physically hurt by someone?: No      Within the past 12 months, have you been humiliated or emotionally abused in other ways by your partner or ex-partner?: No   Housing Stability: Not on file       FAMILY HISTORY:  Family History   Problem Relation Age of Onset     Diabetes Mother 50     No Known Problems Father      No Known Problems Brother      No Known Problems Sister      No Known Problems Brother      No Known Problems Brother      No Known Problems Brother      No Known Problems Sister      Breast Cancer No family hx of      Heart Disease No family hx of        Past/family/social history reviewed and no changes    PHYSICAL EXAMINATION:  Constitutional: aaox3, cooperative, pleasant, not dyspneic/diaphoretic, no acute distress  Vitals reviewed: /73 (BP Location: Left arm, Patient Position: Sitting, Cuff Size: Adult Regular)   Pulse 84   Ht 1.549 m (5' 1\")   Wt 57.4 kg (126 lb 9.6 oz)   LMP 09/26/2023   SpO2 100%   BMI 23.92 kg/m    Wt:   Wt Readings from Last 2 Encounters:   10/31/23 57.4 kg (126 lb 9.6 oz)   10/03/23 56.7 kg (125 lb)      Eyes: Sclera anicteric/injected  CV: No edema  Respiratory: Unlabored breathing  Abd: Nondistended, +bs, no hepatosplenomegaly, nontender, no peritoneal signs  Skin: warm, perfused, no jaundice  Psych: Normal affect  MSK: Normal gait                    Again, thank you for allowing me to participate in the care of your patient.        Sincerely,        Kaleb Montaño PA-C  "

## 2023-10-31 NOTE — NURSING NOTE
"Chief Complaint   Patient presents with    Follow Up     7 month follow up for constipation.     She requests these members of her care team be copied on today's visit information:  PCP: Vi Castaneda    Vitals:    10/31/23 1402   BP: 105/73   BP Location: Left arm   Patient Position: Sitting   Cuff Size: Adult Regular   Pulse: 84   SpO2: 100%   Weight: 57.4 kg (126 lb 9.6 oz)   Height: 1.549 m (5' 1\")     Body mass index is 23.92 kg/m .    Medications were reconciled.    Does patient need any medication refills at today's visit? Kim Leon CMA    "

## 2023-11-01 NOTE — PROGRESS NOTES
FOLLOW UP GI CLINIC VISIT    CC/REFERRING MD:  Kaleb Montaño  REASON FOR CONSULTATION:   Kaleb Montaño for   Chief Complaint   Patient presents with    Follow Up     7 month follow up for constipation.       ASSESSMENT/PLAN: Patient here for follow-up of ongoing constipation.  She initially was doing all right with MiraLAX, but it seems to be a little bit less effective now.  She has continued to have some intermittent hematochezia.  She was diagnosed with anal fissure earlier this year and was treated with topical diltiazem, it sounds like that has resolved, but she is still seeing some bright red blood intermittently.  At this point, I think pursuing colonoscopy is reasonable to further investigate hematochezia and this persistent change to her stool pattern.  She stated understanding of plan and we will follow-up with her after colonoscopy is completed.  For now, she can try increasing the MiraLAX to twice daily to improve stool output.      RTC 3 months    Thank you for this consultation.  It was a pleasure to participate in the care of this patient; please contact us with any further questions.      15 minutes spent on the date of the encounter doing chart review, patient visit, and documentation    This note was created with voice recognition software, and while reviewed for accuracy, typos may remain.     Kaleb Montaño PA-C  Division of Gastroenterology, Hepatology and Nutrition  [facility]  Thea Rodriguez is a 38 year old female that is seen for follow up in the GI clinic today for follow-up of constipation.  She is accompanied by her  and we also had an Maltese  over the phone.  I initially met with patient about 7 months ago.  At that time, she was describing passing hard stool every 3 days or so and needing to manually disimpact the rectal vault with her fingers to pass stool.  This problem had occurred shortly after the birth of her  last child.  She had described having hematochezia intermittently.  Shortly before seeing me, she started using MiraLAX daily and she was having better stool output, had also been referred to colorectal surgery but had not seen them yet.    When I saw her, she had been fairly pleased with the MiraLAX and wanted to continue using it.  We had discussed other options such as Linzess or Amitiza, but she felt comfortable proceeding with MiraLAX and seeing colorectal surgery.    At her visit with colorectal surgery, she was diagnosed with an anal fissure and given some topical diltiazem.  It sounds like that resolved with topical treatment.    She follows up today stating that she has lost a little bit of response to the MiraLAX, now having a little bit firmer BM every 2 to 3 days.  She has continued to have some intermittent hematochezia, but no rectal pain.  There are otherwise no new symptoms going on.  She is hopeful that we can find a potential cause or source for her change in bowel movements, that she does not want to rely on taking laxatives every day.    ROS:    10 point ROS neg other than the symptoms noted above in the HPI.    PREVIOUS ENDOSCOPY:  None    PERTINENT RELEVANT IMAGING OR LABS:  Reviewed    ALLERGIES:   No Known Allergies    PERTINENT MEDICATIONS:    Current Outpatient Medications:     polyethylene glycol (MIRALAX) 17 GM/Dose powder, MIX AND DRINK 17 GRAMS BY MOUTH TWICE DAILY, Disp: 510 g, Rfl: 4    vitamin D3 (CHOLECALCIFEROL) 125 MCG (5000 UT) tablet, Take 125 mcg by mouth daily, Disp: , Rfl:     PROBLEM LIST  Patient Active Problem List    Diagnosis Date Noted    Vitamin D deficiency 10/08/2023     Priority: Medium    Hyperlipidemia LDL goal <160 03/15/2019     Priority: Medium    Impaired fasting glucose 03/15/2019     Priority: Medium    Melasma 03/14/2019     Priority: Medium    Family history of diabetes mellitus in mother 03/14/2019     Priority: Medium    Abnormal chromosomal and genetic  finding on  screening mother 05/10/2018     Priority: Medium     Formatting of this note might be different from the original.  NIPT high risk for trisomy 21         PERTINENT PAST MEDICAL HISTORY:  Past Medical History:   Diagnosis Date    Down syndrome in child of prior pregnancy, currently pregnant 09/10/2019    Vitamin D deficiency 10/08/2023       PREVIOUS SURGERIES:  Past Surgical History:   Procedure Laterality Date     SECTION       SECTION N/A 2020    Procedure:  SECTION;  Surgeon: Rajni Meraz MD;  Location: [facility]     SECTION N/A 2021    Procedure:  SECTION;  Surgeon: Rajni Meraz MD;  Location: [facility]       SOCIAL HISTORY:  Social History     Socioeconomic History    Marital status:      Spouse name: Not on file    Number of children: Not on file    Years of education: Not on file    Highest education level: Not on file   Occupational History    Not on file   Tobacco Use    Smoking status: Never    Smokeless tobacco: Never   Vaping Use    Vaping Use: Never used   Substance and Sexual Activity    Alcohol use: No    Drug use: No    Sexual activity: Yes     Partners: Male     Birth control/protection: I.U.D.   Other Topics Concern    Not on file   Social History Narrative    Not on file     Social Determinants of Health     Financial Resource Strain: Not on file   Food Insecurity: Not on file   Transportation Needs: Not on file   Physical Activity: Not on file   Stress: Not on file   Social Connections: Not on file   Interpersonal Safety: Low Risk  (10/3/2023)    Interpersonal Safety     Do you feel physically and emotionally safe where you currently live?: Yes     Within the past 12 months, have you been hit, slapped, kicked or otherwise physically hurt by someone?: No     Within the past 12 months, have you been humiliated or emotionally abused in other ways by your partner or ex-partner?: No   Housing Stability: Not on file  "      FAMILY HISTORY:  Family History   Problem Relation Age of Onset    Diabetes Mother 50    No Known Problems Father     No Known Problems Brother     No Known Problems Sister     No Known Problems Brother     No Known Problems Brother     No Known Problems Brother     No Known Problems Sister     Breast Cancer No family hx of     Heart Disease No family hx of        Past/family/social history reviewed and no changes    PHYSICAL EXAMINATION:  Constitutional: aaox3, cooperative, pleasant, not dyspneic/diaphoretic, no acute distress  Vitals reviewed: /73 (BP Location: Left arm, Patient Position: Sitting, Cuff Size: Adult Regular)   Pulse 84   Ht 1.549 m (5' 1\")   Wt 57.4 kg (126 lb 9.6 oz)   LMP 09/26/2023   SpO2 100%   BMI 23.92 kg/m    Wt:   Wt Readings from Last 2 Encounters:   10/31/23 57.4 kg (126 lb 9.6 oz)   10/03/23 56.7 kg (125 lb)      Eyes: Sclera anicteric/injected  CV: No edema  Respiratory: Unlabored breathing  Abd: Nondistended, +bs, no hepatosplenomegaly, nontender, no peritoneal signs  Skin: warm, perfused, no jaundice  Psych: Normal affect  MSK: Normal gait                    "

## 2023-12-14 ENCOUNTER — TELEPHONE (OUTPATIENT)
Dept: GASTROENTEROLOGY | Facility: CLINIC | Age: 38
End: 2023-12-14
Payer: COMMERCIAL

## 2023-12-14 DIAGNOSIS — Z12.11 SPECIAL SCREENING FOR MALIGNANT NEOPLASMS, COLON: Primary | ICD-10-CM

## 2023-12-14 NOTE — TELEPHONE ENCOUNTER
"Endoscopy Scheduling Screen    Have you had a positive Covid test in the last 14 days?  No    Are you active on MyChart?   Yes    What insurance is in the chart?  Other:  [facility]     Ordering/Referring Provider: CHRIS RODRIGUEZ     (If ordering provider performs procedure, schedule with ordering provider unless otherwise instructed. )    BMI: Estimated body mass index is 23.92 kg/m  as calculated from the following:    Height as of 10/31/23: 1.549 m (5' 1\").    Weight as of 10/31/23: 57.4 kg (126 lb 9.6 oz).     Sedation Ordered  moderate sedation.   If patient BMI > 50 do not schedule in [facility].    If patient BMI > 45 do not schedule at [facility].    Are you taking methadone or Suboxone?  No    Are you taking any prescription medications for pain 3 or more times per week?   NO - No RN review required.    Do you have a history of malignant hyperthermia or adverse reaction to anesthesia?  No    (Females) Are you currently pregnant?   No     Have you been diagnosed or told you have pulmonary hypertension?   No    Do you have an LVAD?  No    Have you been told you have moderate to severe sleep apnea?  No    Have you been told you have COPD, asthma, or any other lung disease?  No    Do you have any heart conditions?  No     Have you ever had an organ transplant?   No    Have you ever had or are you awaiting a heart or lung transplant?   No    Have you had a stroke or transient ischemic attack (TIA aka \"mini stroke\" in the last 6 months?   No    Have you been diagnosed with or been told you have cirrhosis of the liver?   No    Are you currently on dialysis?   No    Do you need assistance transferring?   No    BMI: Estimated body mass index is 23.92 kg/m  as calculated from the following:    Height as of 10/31/23: 1.549 m (5' 1\").    Weight as of 10/31/23: 57.4 kg (126 lb 9.6 oz).     Is patients BMI > 40 and scheduling location [facility]?  No    Do you take an injectable medication for weight loss or diabetes (excluding " insulin)?  No    Do you take the medication Naltrexone?  No    Do you take blood thinners?  No       Prep   Are you currently on dialysis or do you have chronic kidney disease?  No    Do you have a diagnosis of diabetes?  No    Do you have a diagnosis of cystic fibrosis (CF)?  No    On a regular basis do you go 3 -5 days between bowel movements?  Yes (Extended Prep)    BMI > 40?  No    Preferred Pharmacy:    [id number] - [place], [place] - [address]  [address] [facility] [address]  Phone: [phone] Fax: [phone]        Final Scheduling Details   Colonoscopy prep sent?  Standard MiraLAX    Procedure scheduled  Colonoscopy    Surgeon:  Maureen      Date of procedure:  04/23/2024     Pre-OP / PAC:   No - Not required for this site.    Location  [facility] - Per order.    Sedation   MAC/Deep Sedation  per loc       Patient Reminders:   You will receive a call from a Nurse to review instructions and health history.  This assessment must be completed prior to your procedure.  Failure to complete the Nurse assessment may result in the procedure being cancelled.      On the day of your procedure, please designate an adult(s) who can drive you home stay with you for the next 24 hours. The medicines used in the exam will make you sleepy. You will not be able to drive.      You cannot take public transportation, ride share services, or non-medical taxi service without a responsible caregiver.  Medical transport services are allowed with the requirement that a responsible caregiver will receive you at your destination.  We require that drivers and caregivers are confirmed prior to your procedure.

## 2023-12-30 ENCOUNTER — OFFICE VISIT (OUTPATIENT)
Dept: FAMILY MEDICINE | Facility: CLINIC | Age: 38
End: 2023-12-30
Payer: COMMERCIAL

## 2023-12-30 VITALS
HEART RATE: 77 BPM | RESPIRATION RATE: 12 BRPM | SYSTOLIC BLOOD PRESSURE: 112 MMHG | OXYGEN SATURATION: 100 % | TEMPERATURE: 98.6 F | DIASTOLIC BLOOD PRESSURE: 72 MMHG

## 2023-12-30 DIAGNOSIS — J06.9 VIRAL URI: Primary | ICD-10-CM

## 2023-12-30 DIAGNOSIS — M54.2 NECK PAIN: ICD-10-CM

## 2023-12-30 LAB
DEPRECATED S PYO AG THROAT QL EIA: NEGATIVE
GROUP A STREP BY PCR: NOT DETECTED

## 2023-12-30 PROCEDURE — 87651 STREP A DNA AMP PROBE: CPT

## 2023-12-30 PROCEDURE — 99213 OFFICE O/P EST LOW 20 MIN: CPT

## 2023-12-30 NOTE — PROGRESS NOTES
"  Assessment & Plan     Viral URI  Patient presenting with cough, fatigue, and without fever for 3 days duration. Vitals stable. Exam unremarkable. Rapid strep negative Will proceed with conservative management. Discussed Tylenol and ibuprofen for discomfort and fever, nasal saline for congestion, and oral decongestants or mucolytics.  Also discussed patient to return if worsening or new concerning symptoms.  Declined COVID and influenza testing.  - Streptococcus A Rapid Screen w/Reflex to PCR - Clinic Collect  - Group A Streptococcus PCR Throat Swab    Neck pain  Also reporting neck pain with a history of numbness and tingling per patient report on left upper extremity.  None of that reported today on physical exam.  Normal sensation bilateral upper extremities.  Normal strength throughout.  History of neck pain for which she was given Robaxin which improved her symptoms.  Normal range of motion.  Discussed working this up outpatient with primary care provider as no findings today.  Patient in agreement with plan.  Robaxin offered.    Williams Pradhan DO  [facility]    Reddy Sidhu is a 38 year old, presenting for the following health issues:  Throat Pain (X3days, no fever)    HPI   Acute Illness  Acute illness concerns: Ear pain, throat pain  Onset/Duration: 3 days  Symptoms:  Fever: No  Chills/Sweats: No  Headache (location?): YES  Sinus Pressure: No  Conjunctivitis:  No  Ear Pain: YES- Left sided  Rhinorrhea: No  Congestion: No  Sore Throat: YES  Cough: no  Wheeze: No  Decreased Appetite: No  Fatigue/Achiness: No  Sick/Strep Exposure: No  Therapies tried and outcome: Ibuprofen without improvement    Also reporting neck pain that she had been previously seen for and described \"arm numbness\".  She also reports that she is having soreness on her left hand.  Denies any new trauma.  Reports she has been seen for this before at which point she was given methocarbamol for her symptoms with " improvement.  Denies any burning sensation or tingling.  No weakness, saddle anesthesia, numbness, tingling.    Review of Systems   Constitutional, HEENT, cardiovascular, pulmonary, gi and gu systems are negative, except as otherwise noted.      Objective    /72   Pulse 77   Temp 98.6  F (37  C) (Oral)   Resp 12   SpO2 100%   There is no height or weight on file to calculate BMI.  Physical Exam   GENERAL: healthy, alert and no distress  NECK: no adenopathy, no asymmetry, masses, or scars and thyroid normal to palpation  RESP: lungs clear to auscultation - no rales, rhonchi or wheezes  CV: regular rate and rhythm, normal S1 S2, no S3 or S4, no murmur, click or rub, no peripheral edema and peripheral pulses strong  ABDOMEN: soft, nontender, no hepatosplenomegaly, no masses and bowel sounds normal  MS: no gross musculoskeletal defects noted, no edema.  Normal sensation throughout bilateral upper and lower extremities.  Ambulating well.  Equal and normal strength throughout right upper and left upper extremity.  No neck abnormalities.  Full range of motion.    Results for orders placed or performed in visit on 12/30/23 (from the past 24 hour(s))   Streptococcus A Rapid Screen w/Reflex to PCR - Clinic Collect    Specimen: Throat; Swab   Result Value Ref Range    Group A Strep antigen Negative Negative

## 2023-12-30 NOTE — PATIENT INSTRUCTIONS
Sore Throat    Rapid Strep test was negative.     If overnight test returns positive, we will call tomorrow and call in antibiotic prescription.    No notification means that overnight test returned negative.    Symptoms are likely due to viral infection that will resolve on its own in 1-2 weeks.    May continue with symptomatic treatments including:  - Salt water gargles  - Warm beverages such as a non-caffeinated tea with honey  - Throat drops  - Ibuprofen or acetaminophen for pain or fever relief    If fevers not coming down with acetaminophen or ibuprofen, shortness of breath, not tolerating oral liquid intake, drooling, or stiff neck, return for re-evaluation immediately. Otherwise, if no improvement in the next week, follow up with your primary care provider.

## 2024-02-20 NOTE — TELEPHONE ENCOUNTER
[employer] message sent to pt.  Lina Robison RN       Wt Readings from Last 3 Encounters:   02/20/24 98.2 kg (216 lb 7.9 oz)   02/09/24 99.1 kg (218 lb 6.4 oz)   02/05/24 97.1 kg (214 lb 1.1 oz)   Patient seems to have multiple past hospitalizations involving chest pain and acute on chronic CHF flare ups. Home regimen is Torsemide 100 mg daily, metolazone 5 mg as needed for weight gain from CHF.   Patient does have slight ankle edema compared to her usual baseline according to her. No JVD or hepatojugular reflex. No rales on exam. Does not seem to be acute flare up of CHF at this time, and her home meds seem to be managing it appropriately at this time, but will continue to monitor.  Plan:  -gave patient dose of metolazone 5 mg night of admission, 2/18.   - Daily weights  -Is and Os  - Cardio assesses patient is slightly volume overloaded on 2/19 their recs are to hold home torsemide and gave her 2 doses of IV Bumex 2 mg today. Holding her home doxazosin. Stopped telemetry.  - Cardio changed home torsemide to bumex 2 mg BID + potassium chloride 20 mEq daily

## 2024-04-01 RX ORDER — BISACODYL 5 MG/1
TABLET, DELAYED RELEASE ORAL
Qty: 4 TABLET | Refills: 0 | Status: SHIPPED | OUTPATIENT
Start: 2024-04-01

## 2024-04-18 ENCOUNTER — APPOINTMENT (OUTPATIENT)
Dept: INTERPRETER SERVICES | Facility: CLINIC | Age: 39
End: 2024-04-18

## 2024-04-19 ENCOUNTER — VIRTUAL VISIT (OUTPATIENT)
Dept: INTERPRETER SERVICES | Facility: CLINIC | Age: 39
End: 2024-04-19

## 2024-04-22 ENCOUNTER — ANESTHESIA EVENT (OUTPATIENT)
Dept: SURGERY | Facility: AMBULATORY SURGERY CENTER | Age: 39
End: 2024-04-22
Payer: COMMERCIAL

## 2024-04-23 ENCOUNTER — HOSPITAL ENCOUNTER (OUTPATIENT)
Facility: AMBULATORY SURGERY CENTER | Age: 39
Discharge: HOME | End: 2024-04-23
Attending: SURGERY
Payer: COMMERCIAL

## 2024-04-23 ENCOUNTER — ANESTHESIA (OUTPATIENT)
Dept: SURGERY | Facility: AMBULATORY SURGERY CENTER | Age: 39
End: 2024-04-23
Payer: COMMERCIAL

## 2024-04-23 VITALS
HEART RATE: 73 BPM | SYSTOLIC BLOOD PRESSURE: 137 MMHG | TEMPERATURE: 97.3 F | RESPIRATION RATE: 16 BRPM | OXYGEN SATURATION: 100 % | DIASTOLIC BLOOD PRESSURE: 88 MMHG

## 2024-04-23 DIAGNOSIS — K59.00 CONSTIPATION, UNSPECIFIED CONSTIPATION TYPE: ICD-10-CM

## 2024-04-23 DIAGNOSIS — K92.1 HEMATOCHEZIA: ICD-10-CM

## 2024-04-23 PROCEDURE — 45380 COLONOSCOPY AND BIOPSY: CPT | Mod: 53 | Performed by: SURGERY

## 2024-04-23 RX ORDER — PROPOFOL 10 MG/ML
INJECTION, EMULSION INTRAVENOUS CONTINUOUS PRN
Status: DISCONTINUED | OUTPATIENT
Start: 2024-04-23 | End: 2024-04-23

## 2024-04-23 RX ORDER — LIDOCAINE 40 MG/G
CREAM TOPICAL
Status: DISCONTINUED | OUTPATIENT
Start: 2024-04-23 | End: 2024-04-24 | Stop reason: HOSPADM

## 2024-04-23 RX ORDER — ONDANSETRON 2 MG/ML
4 INJECTION INTRAMUSCULAR; INTRAVENOUS
Status: DISCONTINUED | OUTPATIENT
Start: 2024-04-23 | End: 2024-04-24 | Stop reason: HOSPADM

## 2024-04-23 RX ORDER — OXYCODONE HYDROCHLORIDE 5 MG/1
5 TABLET ORAL
Status: DISCONTINUED | OUTPATIENT
Start: 2024-04-23 | End: 2024-04-24 | Stop reason: HOSPADM

## 2024-04-23 RX ORDER — LIDOCAINE HYDROCHLORIDE 20 MG/ML
INJECTION, SOLUTION INFILTRATION; PERINEURAL PRN
Status: DISCONTINUED | OUTPATIENT
Start: 2024-04-23 | End: 2024-04-23

## 2024-04-23 RX ORDER — ONDANSETRON 2 MG/ML
INJECTION INTRAMUSCULAR; INTRAVENOUS PRN
Status: DISCONTINUED | OUTPATIENT
Start: 2024-04-23 | End: 2024-04-23

## 2024-04-23 RX ORDER — OXYCODONE HYDROCHLORIDE 10 MG/1
10 TABLET ORAL
Status: DISCONTINUED | OUTPATIENT
Start: 2024-04-23 | End: 2024-04-24 | Stop reason: HOSPADM

## 2024-04-23 RX ORDER — NALOXONE HYDROCHLORIDE 0.4 MG/ML
0.1 INJECTION, SOLUTION INTRAMUSCULAR; INTRAVENOUS; SUBCUTANEOUS
Status: DISCONTINUED | OUTPATIENT
Start: 2024-04-23 | End: 2024-04-24 | Stop reason: HOSPADM

## 2024-04-23 RX ORDER — PROPOFOL 10 MG/ML
INJECTION, EMULSION INTRAVENOUS PRN
Status: DISCONTINUED | OUTPATIENT
Start: 2024-04-23 | End: 2024-04-23

## 2024-04-23 RX ORDER — ONDANSETRON 4 MG/1
4 TABLET, ORALLY DISINTEGRATING ORAL EVERY 30 MIN PRN
Status: DISCONTINUED | OUTPATIENT
Start: 2024-04-23 | End: 2024-04-24 | Stop reason: HOSPADM

## 2024-04-23 RX ORDER — GLYCOPYRROLATE 0.2 MG/ML
INJECTION, SOLUTION INTRAMUSCULAR; INTRAVENOUS PRN
Status: DISCONTINUED | OUTPATIENT
Start: 2024-04-23 | End: 2024-04-23

## 2024-04-23 RX ORDER — SODIUM CHLORIDE, SODIUM LACTATE, POTASSIUM CHLORIDE, CALCIUM CHLORIDE 600; 310; 30; 20 MG/100ML; MG/100ML; MG/100ML; MG/100ML
INJECTION, SOLUTION INTRAVENOUS CONTINUOUS
Status: DISCONTINUED | OUTPATIENT
Start: 2024-04-23 | End: 2024-04-24 | Stop reason: HOSPADM

## 2024-04-23 RX ORDER — ONDANSETRON 2 MG/ML
4 INJECTION INTRAMUSCULAR; INTRAVENOUS EVERY 30 MIN PRN
Status: DISCONTINUED | OUTPATIENT
Start: 2024-04-23 | End: 2024-04-24 | Stop reason: HOSPADM

## 2024-04-23 RX ADMIN — LIDOCAINE HYDROCHLORIDE 3 ML: 20 INJECTION, SOLUTION INFILTRATION; PERINEURAL at 10:52

## 2024-04-23 RX ADMIN — ONDANSETRON 4 MG: 2 INJECTION INTRAMUSCULAR; INTRAVENOUS at 10:52

## 2024-04-23 RX ADMIN — GLYCOPYRROLATE 0.2 MG: 0.2 INJECTION, SOLUTION INTRAMUSCULAR; INTRAVENOUS at 10:52

## 2024-04-23 RX ADMIN — PROPOFOL 50 MG: 10 INJECTION, EMULSION INTRAVENOUS at 10:52

## 2024-04-23 RX ADMIN — SODIUM CHLORIDE, SODIUM LACTATE, POTASSIUM CHLORIDE, CALCIUM CHLORIDE: 600; 310; 30; 20 INJECTION, SOLUTION INTRAVENOUS at 10:28

## 2024-04-23 RX ADMIN — PROPOFOL 180 MCG/KG/MIN: 10 INJECTION, EMULSION INTRAVENOUS at 10:52

## 2024-04-23 NOTE — ANESTHESIA POSTPROCEDURE EVALUATION
Patient: Thea Rodriguez    Procedure: Procedure(s):  COLONOSCOPY WITH BIOPSY X1       Anesthesia Type:  MAC    Note:  Disposition: Outpatient   Postop Pain Control: Uneventful            Sign Out: Well controlled pain   PONV: No   Neuro/Psych: Uneventful            Sign Out: Acceptable/Baseline neuro status   Airway/Respiratory: Uneventful            Sign Out: Acceptable/Baseline resp. status   CV/Hemodynamics: Uneventful            Sign Out: Acceptable CV status; No obvious hypovolemia; No obvious fluid overload   Other NRE: NONE   DID A NON-ROUTINE EVENT OCCUR? No       Last vitals:  Vitals Value Taken Time   /91 04/23/24 1146   Temp 97.3  F (36.3  C) 04/23/24 1119   Pulse 75 04/23/24 1148   Resp 16 04/23/24 1119   SpO2 100 % 04/23/24 1148   Vitals shown include unfiled device data.    Electronically Signed By: DEVON LIAO MD  April 23, 2024  12:34 PM

## 2024-04-23 NOTE — ANESTHESIA CARE TRANSFER NOTE
Patient: Thea Rodriguez    Procedure: Procedure(s):  COLONOSCOPY WITH BIOPSY X1       Diagnosis: Constipation, unspecified constipation type [K59.00]  Hematochezia [K92.1]  Diagnosis Additional Information: No value filed.    Anesthesia Type:   MAC     Note:    Oropharynx: oropharynx clear of all foreign objects and spontaneously breathing  Level of Consciousness: awake  Oxygen Supplementation: face mask  Level of Supplemental Oxygen (L/min / FiO2): 10  Independent Airway: airway patency satisfactory and stable  Dentition: dentition unchanged  Vital Signs Stable: post-procedure vital signs reviewed and stable  Report to RN Given: handoff report given  Patient transferred to: Phase II    Handoff Report: Identifed the Patient, Identified the Reponsible Provider, Reviewed the pertinent medical history, Discussed the surgical course, Reviewed Intra-OP anesthesia mangement and issues during anesthesia, Set expectations for post-procedure period and Allowed opportunity for questions and acknowledgement of understanding      Vitals:  Vitals Value Taken Time   /76 04/23/24 1119   Temp 97.3  F (36.3  C) 04/23/24 1119   Pulse 68 04/23/24 1119   Resp 16 04/23/24 1119   SpO2 100 % 04/23/24 1119       Electronically Signed By: KADEN Tee CRNA  April 23, 2024  11:20 AM

## 2024-04-23 NOTE — DISCHARGE INSTRUCTIONS
Discharge Instructions:    Take you medications as directed and follow up with you primary provider as scheduled.   You should expect to pass air from your rectum after the procedure.   Follow these care guidelines during your recovery for the next 24 hours.   If you have any questions or concerns please contact the provider that performed your procedure.     You were given medicine for sedation:  You have been given medicines during your procedure that might make you sleepy and weak. To prevent problems:    *Rest for the rest of the day after you are home. You should be back to your normal activity tomorrow.  *For the next 24 hours:   -Do not drink alcoholic beverages.   -Do not make any important decisions or sign any legal forms.   -Do not work around machinery or power equipment.     The medicines used for sedation may make you feel nauseated.   *Start with clear liquids, such as tea, jell-o, broth and ginger ale. As you feel better you may add soft foods such as pudding and ice cream.   *When you no longer feel nauseated, you may try your normal diet.     You should be back to eating your normal after 24 hours.     Call if you have any of these problems:  *Fever of 101 degree F or 38 degrees C  *Bleeding from the rectum  *Black stool or blood in your bowel movements  *Nausea with vomiting that does not ease after a few hours.  *Abdominal pain or bloating  *Fainting      If you have any questions or concerns regarding your procedure, please contact Dr. Bryant, his office number is [phone].

## 2024-04-23 NOTE — OP NOTE
COLONOSCOPY REPORT      Pre-op Dx:              Colonoscopy for blood in the stool      Procedure:             Colonoscopy with biopsy of the terminal ileum      Indications:            Colon Cancer Screening      Findings:                Very poor bowel prep with solid stool in various places throughout the colon.  I do not see any obvious lesions that were source of GI bleeding.  I did advance the scope all the way into the terminal ileum.    Procedure:              The patient is brought to the endoscopy suite placed in a lateral position and the patient is given conscious sedation anesthesia.  The colonoscope was advanced atraumatically into the anus taken all way up and around to the cecum and into the terminal ileum.  A biopsy was taken in the terminal ileum.  I did not see any evidence of inflammation in the terminal ileum..  The ileocecal valve and the appendiceal orifice were obscured with solid stool.  The scope was slowly drawn back and with the stool in the cecum is very difficult to get an accurate assessment of the cecum itself but no obvious lesions were noted.  There were no lesions seen in the ascending and transverse colon.  In the left colon there is a lot of residual stool that I was unable to clear from the lumen of the colon and with that did not have good visualization of much of the left colon.  Same situation in the rectum unable to get good visualization of the rectum secondary to solid stool.  Despite this I do not see any obvious tumors that were a clear source of GI bleeding and I did not see any obvious red blood within the lumen of the colon.  I would not consider this a accurate or complete study because of the solid stool contamination but I did get all the way through the colon and out into the small intestine.      Given that I was able to see the lumen of the colon all the way through I think it is unlikely the patient has any large tumors growing within the colon although even  this is uncertain given the poor quality of this prep.  I would recommend this patient starts her next colonoscopy at age 43 or in 5 years from now.    Withdrawal Time:  6:00    EBL:                        None      Medications:         MAC; see anesthesia note for details          Complications:      None      Post-op Dx:            Colonoscopy with very poor bowel preparation making this colonoscopy an accurate and incomplete.      Recommendation:   Next Colonoscopy in 5 years; they year 2029      Joseph Bryant MD  4/23/2024 11:15 AM  [facility]  [phone]

## 2024-04-23 NOTE — H&P
PRE COLONOSCOPY EVALUATION   H&P       ASSESSMENT     Thea Rodriguez is a 39 year old female who is in today for a Screening Colonoscopy.  The patient has not had a stool screening test.         PLAN      Screening Colonoscopy         HPI     Thea Rodriguez is a 39 year old female who presents for a colonoscopy.  They do not have a family history of colon cancer  They do not have a history of polyps  They have not been loosing weight  They have not noted any change in bowel habits   They have not had blood in their stool.         PAST MEDICAL HISTORY SURGICAL HISTORY     Past Medical History:   Diagnosis Date    Down syndrome in child of prior pregnancy, currently pregnant 09/10/2019    Vitamin D deficiency 10/08/2023    Past Surgical History:   Procedure Laterality Date     SECTION       SECTION N/A 2020    Procedure:  SECTION;  Surgeon: Rajni Meraz MD;  Location: [facility]     SECTION N/A 2021    Procedure:  SECTION;  Surgeon: Rajni Meraz MD;  Location: [facility]          CURRENT MEDICATIONS ALLERGIES     Current Outpatient Rx   Medication Sig Dispense Refill    bisacodyl (DULCOLAX) 5 MG EC tablet Two days prior to exam take two (2) tablets at 4pm. One day prior to exam take two (2) tablets at 4pm 4 tablet 0    polyethylene glycol (GOLYTELY) 236 g suspension Take as directed. Two days before your exam fill the first container with water. Cover and shake until mixed well. At 5:00pm drink one 8oz glass every 10-15 minutes until half (1/2) of the first container is empty. Store the remainder in the refrigerator. One day before your exam at 5:00pm drink the second half of the first container until it is gone. Before you go to bed mix the second container with water and put in refrigerator. Six hours before your check in time drink one 8oz glass every 10-15 minutes until half of container is empty. Discard the remainder of solution. 8000 mL 0     polyethylene glycol (MIRALAX) 17 GM/Dose powder MIX AND DRINK 17 GRAMS BY MOUTH TWICE DAILY (Patient not taking: Reported on 12/30/2023) 510 g 4    vitamin D3 (CHOLECALCIFEROL) 125 MCG (5000 UT) tablet Take 125 mcg by mouth daily      No Known Allergies       FAMILY HISTORY     Family History   Problem Relation Age of Onset    Diabetes Mother 50    No Known Problems Father     No Known Problems Brother     No Known Problems Sister     No Known Problems Brother     No Known Problems Brother     No Known Problems Brother     No Known Problems Sister     Breast Cancer No family hx of     Heart Disease No family hx of          SOCIAL HISTORY     Social History     Socioeconomic History    Marital status:      Spouse name: None    Number of children: None    Years of education: None    Highest education level: None   Tobacco Use    Smoking status: Never    Smokeless tobacco: Never   Vaping Use    Vaping status: Never Used   Substance and Sexual Activity    Alcohol use: Yes     Comment: rarely    Drug use: No    Sexual activity: Yes     Partners: Male     Birth control/protection: I.U.D.     Social Determinants of Health     Interpersonal Safety: Low Risk  (10/3/2023)    Interpersonal Safety     Do you feel physically and emotionally safe where you currently live?: Yes     Within the past 12 months, have you been hit, slapped, kicked or otherwise physically hurt by someone?: No     Within the past 12 months, have you been humiliated or emotionally abused in other ways by your partner or ex-partner?: No         REVIEW OF SYSTEMS       10 point review of systems are unremarkable except for the symptoms described in the HPI    PHYSICAL EXAM     VITAL SIGNS: /67   Temp (!) 96.7  F (35.9  C) (Temporal)   Resp 14   SpO2 100%    General : Alert, cooperative, appears stated age   Head: Normocephalic, without obvious abnormality,   HEENT:  conjunctiva/corneas clear, EOM's intact, no scleral icterus   Lungs: Clear  to auscultation bilaterally, respirations unlabored  Heart: Regular rate and rhythm, S1, S2 normal, no murmur, rub or gallop   Abdomen: Soft, non-tender, no guarding, + bowel sounds active,   Extremities : No obvious swelling,  Neurologic:  moves all extremities to command, no focal findings    Airway: Class 2  ASA:   2      [facility]  [phone]

## 2024-04-23 NOTE — ANESTHESIA PREPROCEDURE EVALUATION
Anesthesia Pre-Procedure Evaluation    Patient: Thea Rodriguez   MRN: [id number] : 1985        Procedure : Procedure(s):  COLONOSCOPY          Past Medical History:   Diagnosis Date     Down syndrome in child of prior pregnancy, currently pregnant 09/10/2019     Vitamin D deficiency 10/08/2023      Past Surgical History:   Procedure Laterality Date      SECTION        SECTION N/A 2020    Procedure:  SECTION;  Surgeon: Rajni Meraz MD;  Location: [facility]      SECTION N/A 2021    Procedure:  SECTION;  Surgeon: Rajni Meraz MD;  Location: [facility]      No Known Allergies   Social History     Tobacco Use     Smoking status: Never     Smokeless tobacco: Never   Substance Use Topics     Alcohol use: Yes     Comment: rarely      Wt Readings from Last 1 Encounters:   10/31/23 57.4 kg (126 lb 9.6 oz)        Anesthesia Evaluation   Pt has had prior anesthetic. Type: Regional.    No history of anesthetic complications       ROS/MED HX  ENT/Pulmonary:  - neg pulmonary ROS     Neurologic:  - neg neurologic ROS     Cardiovascular:  - neg cardiovascular ROS     METS/Exercise Tolerance:     Hematologic:  - neg hematologic  ROS     Musculoskeletal:  - neg musculoskeletal ROS     GI/Hepatic: Comment: See HnP      Renal/Genitourinary:  - neg Renal ROS     Endo:  - neg endo ROS     Psychiatric/Substance Use:  - neg psychiatric ROS     Infectious Disease:  - neg infectious disease ROS     Malignancy:       Other:          Physical Exam    Airway        Mallampati: I   TM distance: > 3 FB   Neck ROM: full   Mouth opening: > 3 cm    Respiratory Devices and Support         Dental       (+) Completely normal teeth      Cardiovascular   cardiovascular exam normal          Pulmonary   pulmonary exam normal            OUTSIDE LABS:  CBC:   Lab Results   Component Value Date    WBC 6.2 2022    WBC 6.3 2022    HGB 13.0 2022    HGB 14.5 2022    HCT 40.6  "08/01/2022    HCT 44.9 01/18/2022     08/01/2022     01/18/2022     BMP:   Lab Results   Component Value Date     10/03/2023     10/28/2021    POTASSIUM 4.2 10/03/2023    POTASSIUM 3.9 10/28/2021    CHLORIDE 104 10/03/2023    CHLORIDE 112 (H) 10/28/2021    CO2 25 10/03/2023    CO2 24 10/28/2021    BUN 9.7 10/03/2023    BUN 8 10/28/2021    CR 0.63 10/03/2023    CR 0.57 10/28/2021    GLC 94 10/03/2023     (H) 10/28/2021     COAGS: No results found for: \"PTT\", \"INR\", \"FIBR\"  POC:   Lab Results   Component Value Date    HCG Negative 05/17/2022     HEPATIC:   Lab Results   Component Value Date    ALBUMIN 2.5 (L) 10/28/2021    PROTTOTAL 6.4 (L) 10/28/2021    ALT 15 10/28/2021    AST 13 10/28/2021    ALKPHOS 66 10/28/2021    BILITOTAL 0.2 10/28/2021     OTHER:   Lab Results   Component Value Date    A1C 5.6 10/03/2023    CHICHI 9.6 10/03/2023    TSH 2.76 03/03/2021    CRP 4.6 01/18/2022       Anesthesia Plan    ASA Status:  2       Anesthesia Type: MAC.   Induction: Propofol, Intravenous.   Maintenance: TIVA.        Consents    Anesthesia Plan(s) and associated risks, benefits, and realistic alternatives discussed. Questions answered and patient/representative(s) expressed understanding.     - Discussed: Risks, Benefits and Alternatives for the PROCEDURE were discussed     - Discussed with:  Patient,             Postoperative Care    Pain management: IV analgesics.   PONV prophylaxis: Ondansetron (or other 5HT-3), Dexamethasone or Solumedrol     Comments:             DEVON LIAO MD    I have reviewed the pertinent notes and labs in the chart from the past 30 days and (re)examined the patient.  Any updates or changes from those notes are reflected in this note.                  "

## 2024-06-04 ENCOUNTER — ANCILLARY PROCEDURE (OUTPATIENT)
Dept: GENERAL RADIOLOGY | Facility: CLINIC | Age: 39
End: 2024-06-04
Attending: INTERNAL MEDICINE
Payer: COMMERCIAL

## 2024-06-04 ENCOUNTER — OFFICE VISIT (OUTPATIENT)
Dept: FAMILY MEDICINE | Facility: CLINIC | Age: 39
End: 2024-06-04
Payer: COMMERCIAL

## 2024-06-04 VITALS
HEIGHT: 61 IN | SYSTOLIC BLOOD PRESSURE: 103 MMHG | WEIGHT: 124 LBS | OXYGEN SATURATION: 100 % | HEART RATE: 76 BPM | RESPIRATION RATE: 18 BRPM | DIASTOLIC BLOOD PRESSURE: 70 MMHG | TEMPERATURE: 97.6 F | BODY MASS INDEX: 23.41 KG/M2

## 2024-06-04 DIAGNOSIS — M25.512 CHRONIC LEFT SHOULDER PAIN: ICD-10-CM

## 2024-06-04 DIAGNOSIS — G89.29 CHRONIC LEFT SHOULDER PAIN: ICD-10-CM

## 2024-06-04 DIAGNOSIS — M54.12 CERVICAL RADICULOPATHY: ICD-10-CM

## 2024-06-04 DIAGNOSIS — M54.12 CERVICAL RADICULOPATHY: Primary | ICD-10-CM

## 2024-06-04 PROCEDURE — 72040 X-RAY EXAM NECK SPINE 2-3 VW: CPT | Mod: TC | Performed by: RADIOLOGY

## 2024-06-04 PROCEDURE — 99213 OFFICE O/P EST LOW 20 MIN: CPT | Performed by: INTERNAL MEDICINE

## 2024-06-04 PROCEDURE — 73030 X-RAY EXAM OF SHOULDER: CPT | Mod: TC | Performed by: RADIOLOGY

## 2024-06-04 RX ORDER — PREDNISONE 20 MG/1
20 TABLET ORAL DAILY
Qty: 5 TABLET | Refills: 0 | Status: SHIPPED | OUTPATIENT
Start: 2024-06-04

## 2024-06-04 NOTE — PROGRESS NOTES
Assessment & Plan   Problem List Items Addressed This Visit    None  Visit Diagnoses       Cervical radiculopathy    -  Primary    Relevant Medications    predniSONE (DELTASONE) 20 MG tablet    Other Relevant Orders    XR Shoulder Left G/E 3 Views (Completed)    XR Cervical Spine 2/3 Views (Completed)    Physical Therapy  Referral    Chronic left shoulder pain        Relevant Medications    predniSONE (DELTASONE) 20 MG tablet    Other Relevant Orders    XR Shoulder Left G/E 3 Views (Completed)    XR Cervical Spine 2/3 Views (Completed)    Physical Therapy  Referral    Orthopedic  Referral           With the findings on xray, will refer to orthopedics           Work on weight loss  Regular exercise      Reddy Sidhu is a 39 year old, presenting for the following health issues:  Heart Problem (Feels like she has a slow heart beat, also is fatigued)        6/4/2024     7:45 AM   Additional Questions   Roomed by Rosalia     History of Present Illness       Reason for visit:  Feels like she has a slow heart beat and has fatigue  Symptom onset:  3-4 weeks ago  Symptoms include:  Slow heart beat and fatigue  Symptom intensity:  Moderate  Symptom progression:  Staying the same  Had these symptoms before:  No    She eats 2-3 servings of fruits and vegetables daily.She consumes 0 sweetened beverage(s) daily.She exercises with enough effort to increase her heart rate 20 to 29 minutes per day.  She exercises with enough effort to increase her heart rate 3 or less days per week.   She is taking medications regularly.       Pain in the chest and left arm   Tired and all the time .   Gets worse walking exercise .  Muscle problem   Hard to sleep neck , arm and breast .  No lump in the breast   Aching pain   Arms feel reach  Rotator cuff   No accident or injury or slipping   Not short ness of breath   No medications   Constipation - sometimes cramping and pain in the back                     Review of  "Systems  Constitutional, HEENT, cardiovascular, pulmonary, gi and gu systems are negative, except as otherwise noted.      Objective    /70 (BP Location: Left arm, Patient Position: Chair, Cuff Size: Adult Regular)   Pulse 76   Temp 97.6  F (36.4  C)   Resp 18   Ht 1.549 m (5' 1\")   Wt 56.2 kg (124 lb)   LMP  (LMP Unknown)   SpO2 100%   BMI 23.43 kg/m    Body mass index is 23.43 kg/m .  Physical Exam   GENERAL: alert and no distress  EYES: Eyes grossly normal to inspection, PERRL and conjunctivae and sclerae normal  HENT: ear canals and TM's normal, nose and mouth without ulcers or lesions  NECK: no adenopathy, no asymmetry, masses, or scars  RESP: lungs clear to auscultation - no rales, rhonchi or wheezes  CV: regular rate and rhythm, normal S1 S2, no S3 or S4, no murmur, click or rub, no peripheral edema  ABDOMEN: soft, nontender, no hepatosplenomegaly, no masses and bowel sounds normal  MS: pain on AC joint and into the cervical spine region along the trapezious muscle group without rotator cuff tear       SKIN: no suspicious lesions or rashes  NEURO: Normal strength and tone, mentation intact and speech normal  BACK: no CVA tenderness, no paralumbar tenderness  PSYCH: mentation appears normal, affect normal/bright  LYMPH: no cervical, supraclavicular, axillary, or inguinal adenopathy    Results for orders placed or performed in visit on 06/04/24   XR Cervical Spine 2/3 Views     Status: None    Narrative    CERVICAL SPINE 2/3 VIEWS 6/4/2024 8:34 AM     HISTORY: Cervical radiculopathy.    COMPARISON: None.       Impression    IMPRESSION: Cervical spine alignment is within normal limits. No loss  of vertebral body height. No significant degenerative endplate changes  or loss of disc height.     STEPHANY BURNETT MD         SYSTEM ID:  [id number]   Results for orders placed or performed in visit on 06/04/24   XR Shoulder Left G/E 3 Views     Status: None    Narrative    SHOULDER LEFT THREE OR MORE VIEWS  " 6/4/2024 8:34 AM    HISTORY: Cervical radiculopathy; Chronic left shoulder pain.    COMPARISON: None.      Impression    IMPRESSION: No fracture or dislocation. No degenerative changes. Area  of patchy sclerosis in the proximal left humeral shaft may represent  an area of osteonecrosis. MRI may be helpful in further evaluation.    MILTON DUNLAP MD         SYSTEM ID:  [id number]           Signed Electronically by: Joseph Sheldon MD

## 2024-06-13 ENCOUNTER — APPOINTMENT (OUTPATIENT)
Dept: INTERPRETER SERVICES | Facility: CLINIC | Age: 39
End: 2024-06-13

## 2024-10-11 ENCOUNTER — OFFICE VISIT (OUTPATIENT)
Dept: URGENT CARE | Facility: URGENT CARE | Age: 39
End: 2024-10-11

## 2024-10-11 VITALS
BODY MASS INDEX: 23.24 KG/M2 | WEIGHT: 123 LBS | DIASTOLIC BLOOD PRESSURE: 64 MMHG | SYSTOLIC BLOOD PRESSURE: 102 MMHG | RESPIRATION RATE: 16 BRPM | OXYGEN SATURATION: 99 % | HEART RATE: 70 BPM | TEMPERATURE: 98.2 F

## 2024-10-11 DIAGNOSIS — S46.912A STRAIN OF LEFT SHOULDER, INITIAL ENCOUNTER: Primary | ICD-10-CM

## 2024-10-11 DIAGNOSIS — R53.83 OTHER FATIGUE: ICD-10-CM

## 2024-10-11 DIAGNOSIS — D50.9 MICROCYTIC ANEMIA: ICD-10-CM

## 2024-10-11 DIAGNOSIS — S16.1XXA STRAIN OF NECK MUSCLE, INITIAL ENCOUNTER: ICD-10-CM

## 2024-10-11 LAB
BASOPHILS # BLD AUTO: 0 10E3/UL (ref 0–0.2)
BASOPHILS NFR BLD AUTO: 1 %
EOSINOPHIL # BLD AUTO: 0.2 10E3/UL (ref 0–0.7)
EOSINOPHIL NFR BLD AUTO: 3 %
ERYTHROCYTE [DISTWIDTH] IN BLOOD BY AUTOMATED COUNT: 18.1 % (ref 10–15)
FERRITIN SERPL-MCNC: 10 NG/ML (ref 6–175)
HCT VFR BLD AUTO: 38.6 % (ref 35–47)
HGB BLD-MCNC: 11.4 G/DL (ref 11.7–15.7)
IMM GRANULOCYTES # BLD: 0 10E3/UL
IMM GRANULOCYTES NFR BLD: 0 %
IRON BINDING CAPACITY (ROCHE): 457 UG/DL (ref 240–430)
IRON SATN MFR SERPL: 8 % (ref 15–46)
IRON SERPL-MCNC: 35 UG/DL (ref 37–145)
LYMPHOCYTES # BLD AUTO: 2 10E3/UL (ref 0.8–5.3)
LYMPHOCYTES NFR BLD AUTO: 33 %
MCH RBC QN AUTO: 22.6 PG (ref 26.5–33)
MCHC RBC AUTO-ENTMCNC: 29.5 G/DL (ref 31.5–36.5)
MCV RBC AUTO: 76 FL (ref 78–100)
MONOCYTES # BLD AUTO: 0.4 10E3/UL (ref 0–1.3)
MONOCYTES NFR BLD AUTO: 6 %
NEUTROPHILS # BLD AUTO: 3.5 10E3/UL (ref 1.6–8.3)
NEUTROPHILS NFR BLD AUTO: 58 %
PLATELET # BLD AUTO: 285 10E3/UL (ref 150–450)
RBC # BLD AUTO: 5.05 10E6/UL (ref 3.8–5.2)
WBC # BLD AUTO: 6 10E3/UL (ref 4–11)

## 2024-10-11 PROCEDURE — 83550 IRON BINDING TEST: CPT | Performed by: PHYSICIAN ASSISTANT

## 2024-10-11 PROCEDURE — 36415 COLL VENOUS BLD VENIPUNCTURE: CPT | Performed by: PHYSICIAN ASSISTANT

## 2024-10-11 PROCEDURE — 82728 ASSAY OF FERRITIN: CPT | Performed by: PHYSICIAN ASSISTANT

## 2024-10-11 PROCEDURE — 85025 COMPLETE CBC W/AUTO DIFF WBC: CPT | Performed by: PHYSICIAN ASSISTANT

## 2024-10-11 PROCEDURE — 83540 ASSAY OF IRON: CPT | Performed by: PHYSICIAN ASSISTANT

## 2024-10-11 PROCEDURE — 99214 OFFICE O/P EST MOD 30 MIN: CPT | Performed by: PHYSICIAN ASSISTANT

## 2024-10-11 RX ORDER — MELOXICAM 15 MG/1
15 TABLET ORAL DAILY
Qty: 20 TABLET | Refills: 0 | Status: SHIPPED | OUTPATIENT
Start: 2024-10-11

## 2024-10-11 RX ORDER — FERROUS SULFATE 325(65) MG
325 TABLET ORAL EVERY OTHER DAY
Qty: 30 TABLET | Refills: 0 | Status: SHIPPED | OUTPATIENT
Start: 2024-10-11

## 2024-10-11 RX ORDER — CYCLOBENZAPRINE HCL 10 MG
10 TABLET ORAL 3 TIMES DAILY PRN
Qty: 20 TABLET | Refills: 0 | Status: SHIPPED | OUTPATIENT
Start: 2024-10-11

## 2024-10-11 ASSESSMENT — ENCOUNTER SYMPTOMS
NECK PAIN: 1
JOINT SWELLING: 0
SHORTNESS OF BREATH: 0
NUMBNESS: 0
COLOR CHANGE: 0
FATIGUE: 1
FEVER: 0

## 2024-10-11 NOTE — PROGRESS NOTES
Assessment & Plan:        ICD-10-CM    1. Strain of left shoulder, initial encounter  S46.912A meloxicam (MOBIC) 15 MG tablet     cyclobenzaprine (FLEXERIL) 10 MG tablet      2. Strain of neck muscle, initial encounter  S16.1XXA meloxicam (MOBIC) 15 MG tablet     cyclobenzaprine (FLEXERIL) 10 MG tablet      3. Other fatigue  R53.83 CBC with platelets and differential     CBC with platelets and differential     Ferritin     Iron and iron binding capacity     Ferritin     Iron and iron binding capacity      4. Microcytic anemia  D50.9 ferrous sulfate (FEROSUL) 325 (65 Fe) MG tablet            Plan/Clinical Decision Makin) Neck strain, left shoulder strain.   Hx of chronic neck pain and left shoulder pain.   Acute injury when slipping on stairs. FROM with pain at end of ROM with left posterior neck and shoulder ROM. Normal neurovascular exam. Discussed ice, stretches, Tylenol, meloxicam and Flexeril as needed.     2) Fatigue  Has had lately. Heavier periods.   CBC showing microcytic anemia. Can start every other day iron.   Ferritin and iron studies ordered.   Should schedule Follow-up with PCP for recheck and Follow-up on heavier period.   Patient Instructions    I recommend ice, stretches, Tylenol for your injuries.     Also you can try Meloxicam daily to help with pain and inflammation.     If not improving in 1-2 weeks we can consider a referral to physical therapy.     If having more severe pain you can try muscle relaxer (Flexeril) mostly take at night due to sedation.     You had a mildly low hemoglobin. I'm obtaining some further tests for this. We will call you for any positive results. Please follow up with your primary due to your tiredness/fatigue.       Return if symptoms worsen or fail to improve, for in 5-7 days.     At the end of the encounter, I discussed results, diagnosis, medications. Discussed red flags for immediate return to clinic/ER, as well as indications for follow up if no  improvement. Patient understood and agreed to plan. Patient was stable for discharge.        Debra Roblero PA-C on 10/11/2024 at 12:43 PM          Subjective:     HPI:    Thea is a 39 year old female who presents to clinic today for the following health issues:  Chief Complaint   Patient presents with    Urgent Care     Fall on stairs this morning and hurt neck and left shoulder.  She also has a headache.  She did not go unconscious.  She says she does not need  for visit.      HPI    Fell on stairs this morning. Patient slipped on carpeted stairs and fell back.   Has left shoulder pain and neck pain.   Headache. No LOC, no head injury.   Has some tingling left arm. No issues with legs.     Review of chart shows history of left shoulder pain. Hx of cervical radiculopathy    Mentions she has been tired.   Has has heavier periods lately.     Review of Systems   Constitutional:  Positive for fatigue. Negative for fever.   Respiratory:  Negative for shortness of breath.    Cardiovascular:  Negative for chest pain.   Musculoskeletal:  Positive for neck pain. Negative for joint swelling.   Skin:  Negative for color change.   Neurological:  Negative for numbness.         Patient Active Problem List   Diagnosis    Melasma    Family history of diabetes mellitus in mother    Hyperlipidemia LDL goal <160    Impaired fasting glucose    Abnormal chromosomal and genetic finding on  screening mother    Vitamin D deficiency        Past Medical History:   Diagnosis Date    Down syndrome in child of prior pregnancy, currently pregnant 09/10/2019    Vitamin D deficiency 10/08/2023       Social History     Tobacco Use    Smoking status: Never     Passive exposure: Never    Smokeless tobacco: Never   Substance Use Topics    Alcohol use: Yes     Comment: rarely             Objective:     Vitals:    10/11/24 1237   BP: 102/64   Pulse: 70   Resp: 16   Temp: 98.2  F (36.8  C)   TempSrc: Oral   SpO2: 99%   Weight:  55.8 kg (123 lb)         Physical Exam   EXAM:   Pleasant, alert, appropriate appearance. NAD.  Head Exam: Normocephalic, atraumatic.  Neck/Thyroid Exam:  tenderness left posterior neck. FROM.   Chest/Respiratory Exam: CTAB.  Cardiovascular Exam: RRR. No murmur or rubs.  Ext/musculoskeletal: left posterior shoulder tenderness, Good ROM, pain at end of ROM. Nl radial pulses.   Neuro: CN II-XII intact grossly intact.  Skin: no rash or lesion.      Results:  Results for orders placed or performed in visit on 10/11/24   CBC with platelets and differential     Status: Abnormal   Result Value Ref Range    WBC Count 6.0 4.0 - 11.0 10e3/uL    RBC Count 5.05 3.80 - 5.20 10e6/uL    Hemoglobin 11.4 (L) 11.7 - 15.7 g/dL    Hematocrit 38.6 35.0 - 47.0 %    MCV 76 (L) 78 - 100 fL    MCH 22.6 (L) 26.5 - 33.0 pg    MCHC 29.5 (L) 31.5 - 36.5 g/dL    RDW 18.1 (H) 10.0 - 15.0 %    Platelet Count 285 150 - 450 10e3/uL    % Neutrophils 58 %    % Lymphocytes 33 %    % Monocytes 6 %    % Eosinophils 3 %    % Basophils 1 %    % Immature Granulocytes 0 %    Absolute Neutrophils 3.5 1.6 - 8.3 10e3/uL    Absolute Lymphocytes 2.0 0.8 - 5.3 10e3/uL    Absolute Monocytes 0.4 0.0 - 1.3 10e3/uL    Absolute Eosinophils 0.2 0.0 - 0.7 10e3/uL    Absolute Basophils 0.0 0.0 - 0.2 10e3/uL    Absolute Immature Granulocytes 0.0 <=0.4 10e3/uL   CBC with platelets and differential     Status: Abnormal    Narrative    The following orders were created for panel order CBC with platelets and differential.  Procedure                               Abnormality         Status                     ---------                               -----------         ------                     CBC with platelets and d...[[id number]]  Abnormal            Final result                 Please view results for these tests on the individual orders.

## 2024-10-11 NOTE — PATIENT INSTRUCTIONS
I recommend ice, stretches, Tylenol for your injuries.     Also you can try Meloxicam daily to help with pain and inflammation.     If not improving in 1-2 weeks we can consider a referral to physical therapy.     If having more severe pain you can try muscle relaxer (Flexeril) mostly take at night due to sedation.     You had a mildly low hemoglobin. I'm obtaining some further tests for this. We will call you for any positive results. Please follow up with your primary due to your tiredness/fatigue.

## 2024-10-24 NOTE — PATIENT INSTRUCTIONS
Preventive Health Recommendations  Female Ages 26 - 39  Yearly exam:   See your health care provider every year in order to    Review health changes.     Discuss preventive care.      Review your medicines if you your doctor has prescribed any.    Until age 30: Get a Pap test every three years (more often if you have had an abnormal result).    After age 30: Talk to your doctor about whether you should have a Pap test every 3 years or have a Pap test with HPV screening every 5 years.   You do not need a Pap test if your uterus was removed (hysterectomy) and you have not had cancer.  You should be tested each year for STDs (sexually transmitted diseases), if you're at risk.   Talk to your provider about how often to have your cholesterol checked.  If you are at risk for diabetes, you should have a diabetes test (fasting glucose).  Shots: Get a flu shot each year. Get a tetanus shot every 10 years.   Nutrition:     Eat at least 5 servings of fruits and vegetables each day.    Eat whole-grain bread, whole-wheat pasta and brown rice instead of white grains and rice.    Get adequate Calcium and Vitamin D.     Lifestyle    Exercise at least 150 minutes a week (30 minutes a day, 5 days of the week). This will help you control your weight and prevent disease.    Limit alcohol to one drink per day.    No smoking.     Wear sunscreen to prevent skin cancer.    See your dentist every six months for an exam and cleaning.  [facility]     Discharged by : Sussy Dacosta CMA       If you have any questions regarding your visit please contact your care team:     Team Gold                Clinic Hours Telephone Number     Dr. Jimmy Castaneda, [facility]   7am-7pm  Monday - Thursday   7am-5pm  Fridays  [phone]   (Appointment scheduling available 24/7)     RN Line  [phone] option 2     Urgent [facility] - [facility] - 11am-9pm  Monday-Friday Saturday-Sunday- 9am-5pm     [place] -   5pm-9pm Monday-Friday Saturday-Sunday- 9am-5pm    [phone] - [facility]    [phone] - [place]     For a Price Quote for your services, please call our Consumer Price Line at [phone].     What options do I have for visits at the clinic other than the traditional office visit?     To expand how we care for you, many of our providers are utilizing electronic visits (e-visits) and telephone visits, when medically appropriate, for interactions with their patients rather than a visit in the clinic. We also offer nurse visits for many medical concerns. Just like any other service, we will bill your insurance company for this type of visit based on time spent on the phone with your provider. Not all insurance companies cover these visits. Please check with your medical insurance if this type of visit is covered. You will be responsible for any charges that are not paid by your insurance.     E-visits via [employer]: generally incur a $45.00 fee.     Telephone visits:  Time spent on the phone: *charged based on time that is spent on the phone in increments of 10 minutes. Estimated cost:   5-10 mins $30.00   11-20 mins. $59.00   21-30 mins. $85.00       Use [employer] (secure email communication and access to your chart) to send your primary care provider a message or make an appointment. Ask someone on your Team how to sign up for [employer].     As always, Thank you for trusting us with your health care needs!      [facility]:    Scheduling Appointments  [facility]  Call: [phone]    [facility]  Call: [phone]    [facility]  Call: [phone]    For Gastroenterology referrals   [facility]   Clinics and Surgery [place], 4th Floor   [address] [place] [id number]   Appointments: [phone]    WHERE TO GO FOR  CARE?    Clinic    Make an appointment if you:       Are sick (cold, cough, flu, sore throat, earache or in pain).       Have a small injury (sprain, small cut, burn or broken bone).       Need a physical exam, Pap smear, vaccine or prescription refill.       Have questions about your health or medicines.    To reach us:      Call [phone] ([phone]). Open 24 hours every day. (For counseling services, call [phone].)    Log into [employer] at ybuy. (Visit [email or web address] to create an account.) Hospital emergency room    An emergency is a serious or life- threatening problem that must be treated right away.    Call [phone] or get to the hospital if you have:      Very bad or sudden:            - Chest pain or pressure         - Bleeding         - Head or belly pain         - Dizziness or trouble seeing, walking or                          Speaking      Problems breathing      Blood in your vomit or you are coughing up blood      A major injury (knocked out, loss of a finger or limb, rape, broken bone protruding from skin)    A mental health crisis. (Or call the Mental Health Crisis line at 1-577.784.8195 or Suicide Prevention Hotline at 1-800.122.9069.)    Open 24 hours every day. You don't need an appointment.     Urgent care    Visit urgent care for sickness or small injuries when the clinic is closed. You don't need an appointment. To check hours or find an urgent care near you, visit [email or web address]. Online care    Get online care from [facility] for more than 70 common problems, like colds, allergies and infections. Open 24 hours every day at:   [email or web address]   Need help deciding?    For advice about where to be seen, you may call your clinic and ask to speak with a nurse. We're here for you 24 hours every day.         If you are deaf or hard of hearing, please let us know. We provide many free services including sign language interpreters, oral interpreters, TTYs, telephone  amplifiers, note takers and written materials.              Topical Retinoid counseling:  Patient advised to apply a pea-sized amount only at bedtime and wait 30 minutes after washing their face before applying.  If too drying, patient may add a non-comedogenic moisturizer. The patient verbalized understanding of the proper use and possible adverse effects of retinoids.  All of the patient's questions and concerns were addressed. Cimzia Counseling:  I discussed with the patient the risks of Cimzia including but not limited to immunosuppression, allergic reactions and infections.  The patient understands that monitoring is required including a PPD at baseline and must alert us or the primary physician if symptoms of infection or other concerning signs are noted. Include Pregnancy/Lactation Warning?: No Fluconazole Counseling:  Patient counseled regarding adverse effects of fluconazole including but not limited to headache, diarrhea, nausea, upset stomach, liver function test abnormalities, taste disturbance, and stomach pain.  There is a rare possibility of liver failure that can occur when taking fluconazole.  The patient understands that monitoring of LFTs and kidney function test may be required, especially at baseline. The patient verbalized understanding of the proper use and possible adverse effects of fluconazole.  All of the patient's questions and concerns were addressed. Humira Pregnancy And Lactation Text: This medication is Pregnancy Category B and is considered safe during pregnancy. It is unknown if this medication is excreted in breast milk. Mirvaso Counseling: Mirvaso is a topical medication which can decrease superficial blood flow where applied. Side effects are uncommon and include stinging, redness and allergic reactions. Litfulo Pregnancy And Lactation Text: Based on animal studies, Lifulo may cause embryo-fetal harm when administered to pregnant women.  The medication should not be used in pregnancy.  Breastfeeding is not recommended during treatment. Finasteride Pregnancy And Lactation Text: This medication is absolutely contraindicated during pregnancy. It is unknown if it is excreted in breast milk. Nsaids Pregnancy And Lactation Text: These medications are considered safe up to 30 weeks gestation. It is excreted in breast milk. Topical Metronidazole Pregnancy And Lactation Text: This medication is Pregnancy Category B and considered safe during pregnancy.  It is also considered safe to use while breastfeeding. Libtayo Counseling- I discussed with the patient the risks of Libtayo including but not limited to nausea, vomiting, diarrhea, and bone or muscle pain.  The patient verbalized understanding of the proper use and possible adverse effects of Libtayo.  All of the patient's questions and concerns were addressed. Clindamycin Counseling: I counseled the patient regarding use of clindamycin as an antibiotic for prophylactic and/or therapeutic purposes. Clindamycin is active against numerous classes of bacteria, including skin bacteria. Side effects may include nausea, diarrhea, gastrointestinal upset, rash, hives, yeast infections, and in rare cases, colitis. VTAMA Counseling: I discussed with the patient that VTAMA is not for use in the eyes, mouth or mouth. They should call the office if they develop any signs of allergic reactions to VTAMA. The patient verbalized understanding of the proper use and possible adverse effects of VTAMA.  All of the patient's questions and concerns were addressed. Cyclosporine Counseling:  I discussed with the patient the risks of cyclosporine including but not limited to hypertension, gingival hyperplasia,myelosuppression, immunosuppression, liver damage, kidney damage, neurotoxicity, lymphoma, and serious infections. The patient understands that monitoring is required including baseline blood pressure, CBC, CMP, lipid panel and uric acid, and then 1-2 times monthly CMP and blood pressure. Quinolones Counseling:  I discussed with the patient the risks of fluoroquinolones including but not limited to GI upset, allergic reaction, drug rash, diarrhea, dizziness, photosensitivity, yeast infections, liver function test abnormalities, tendonitis/tendon rupture. Siliq Pregnancy And Lactation Text: The risk during pregnancy and breastfeeding is uncertain with this medication. Vtama Pregnancy And Lactation Text: It is unknown if this medication can cause problems during pregnancy and breastfeeding. Calcipotriene Pregnancy And Lactation Text: The use of this medication during pregnancy or lactation is not recommended as there is insufficient data. Sski Pregnancy And Lactation Text: This medication is Pregnancy Category D and isn't considered safe during pregnancy. It is excreted in breast milk. Arava Pregnancy And Lactation Text: This medication is Pregnancy Category X and is absolutely contraindicated during pregnancy. It is unknown if it is excreted in breast milk. Clindamycin Pregnancy And Lactation Text: This medication can be used in pregnancy if certain situations. Clindamycin is also present in breast milk. Aklief counseling:  Patient advised to apply a pea-sized amount only at bedtime and wait 30 minutes after washing their face before applying.  If too drying, patient may add a non-comedogenic moisturizer.  The most commonly reported side effects including irritation, redness, scaling, dryness, stinging, burning, itching, and increased risk of sunburn.  The patient verbalized understanding of the proper use and possible adverse effects of retinoids.  All of the patient's questions and concerns were addressed. Libtayo Pregnancy And Lactation Text: This medication is contraindicated in pregnancy and when breast feeding. Protopic Pregnancy And Lactation Text: This medication is Pregnancy Category C. It is unknown if this medication is excreted in breast milk when applied topically. Acitretin Counseling:  I discussed with the patient the risks of acitretin including but not limited to hair loss, dry lips/skin/eyes, liver damage, hyperlipidemia, depression/suicidal ideation, photosensitivity.  Serious rare side effects can include but are not limited to pancreatitis, pseudotumor cerebri, bony changes, clot formation/stroke/heart attack.  Patient understands that alcohol is contraindicated since it can result in liver toxicity and significantly prolong the elimination of the drug by many years. Cyclophosphamide Counseling:  I discussed with the patient the risks of cyclophosphamide including but not limited to hair loss, hormonal abnormalities, decreased fertility, abdominal pain, diarrhea, nausea and vomiting, bone marrow suppression and infection. The patient understands that monitoring is required while taking this medication. Taltz Counseling: I discussed with the patient the risks of ixekizumab including but not limited to immunosuppression, serious infections, worsening of inflammatory bowel disease and drug reactions.  The patient understands that monitoring is required including a PPD at baseline and must alert us or the primary physician if symptoms of infection or other concerning signs are noted. Glycopyrrolate Counseling:  I discussed with the patient the risks of glycopyrrolate including but not limited to skin rash, drowsiness, dry mouth, difficulty urinating, and blurred vision. Eucrisa Pregnancy And Lactation Text: This medication has not been assigned a Pregnancy Risk Category but animal studies failed to show danger with the topical medication. It is unknown if the medication is excreted in breast milk. Glycopyrrolate Pregnancy And Lactation Text: This medication is Pregnancy Category B and is considered safe during pregnancy. It is unknown if it is excreted breast milk. Cimetidine Counseling:  I discussed with the patient the risks of Cimetidine including but not limited to gynecomastia, headache, diarrhea, nausea, drowsiness, arrhythmias, pancreatitis, skin rashes, psychosis, bone marrow suppression and kidney toxicity. Topical Retinoid Pregnancy And Lactation Text: This medication is Pregnancy Category C. It is unknown if this medication is excreted in breast milk. Mirvaso Pregnancy And Lactation Text: This medication has not been assigned a Pregnancy Risk Category. It is unknown if the medication is excreted in breast milk. Olumiant Counseling: I discussed with the patient the risks of Olumiant therapy including but not limited to upper respiratory tract infections, shingles, cold sores, and nausea. Live vaccines should be avoided.  This medication has been linked to serious infections; higher rate of mortality; malignancy and lymphoproliferative disorders; major adverse cardiovascular events; thrombosis; gastrointestinal perforations; neutropenia; lymphopenia; anemia; liver enzyme elevations; and lipid elevations. Hydroquinone Counseling:  Patient advised that medication may result in skin irritation, lightening (hypopigmentation), dryness, and burning.  In the event of skin irritation, the patient was advised to reduce the amount of the drug applied or use it less frequently.  Rarely, spots that are treated with hydroquinone can become darker (pseudoochronosis).  Should this occur, patient instructed to stop medication and call the office. The patient verbalized understanding of the proper use and possible adverse effects of hydroquinone.  All of the patient's questions and concerns were addressed. Olanzapine Counseling- I discussed with the patient the common side effects of olanzapine including but are not limited to: lack of energy, dry mouth, increased appetite, sleepiness, tremor, constipation, dizziness, changes in behavior, or restlessness.  Explained that teenagers are more likely to experience headaches, abdominal pain, pain in the arms or legs, tiredness, and sleepiness.  Serious side effects include but are not limited: increased risk of death in elderly patients who are confused, have memory loss, or dementia-related psychosis; hyperglycemia; increased cholesterol and triglycerides; and weight gain. Fluconazole Pregnancy And Lactation Text: This medication is Pregnancy Category C and it isn't know if it is safe during pregnancy. It is also excreted in breast milk. Cyclosporine Pregnancy And Lactation Text: This medication is Pregnancy Category C and it isn't know if it is safe during pregnancy. This medication is excreted in breast milk. Cimzia Pregnancy And Lactation Text: This medication crosses the placenta but can be considered safe in certain situations. Cimzia may be excreted in breast milk. Birth Control Pills Counseling: Birth Control Pill Counseling: I discussed with the patient the potential side effects of OCPs including but not limited to increased risk of stroke, heart attack, thrombophlebitis, deep venous thrombosis, hepatic adenomas, breast changes, GI upset, headaches, and depression.  The patient verbalized understanding of the proper use and possible adverse effects of OCPs. All of the patient's questions and concerns were addressed. Topical Steroids Counseling: I discussed with the patient that prolonged use of topical steroids can result in the increased appearance of superficial blood vessels (telangiectasias), lightening (hypopigmentation) and thinning of the skin (atrophy).  Patient understands to avoid using high potency steroids in skin folds, the groin or the face.  The patient verbalized understanding of the proper use and possible adverse effects of topical steroids.  All of the patient's questions and concerns were addressed. Hyrimoz Counseling:  I discussed with the patient the risks of adalimumab including but not limited to myelosuppression, immunosuppression, autoimmune hepatitis, demyelinating diseases, lymphoma, and serious infections.  The patient understands that monitoring is required including a PPD at baseline and must alert us or the primary physician if symptoms of infection or other concerning signs are noted. Birth Control Pills Pregnancy And Lactation Text: This medication should be avoided if pregnant and for the first 30 days post-partum. Albendazole Pregnancy And Lactation Text: This medication is Pregnancy Category C and it isn't known if it is safe during pregnancy. It is also excreted in breast milk. Doxycycline Counseling:  Patient counseled regarding possible photosensitivity and increased risk for sunburn.  Patient instructed to avoid sunlight, if possible.  When exposed to sunlight, patients should wear protective clothing, sunglasses, and sunscreen.  The patient was instructed to call the office immediately if the following severe adverse effects occur:  hearing changes, easy bruising/bleeding, severe headache, or vision changes.  The patient verbalized understanding of the proper use and possible adverse effects of doxycycline.  All of the patient's questions and concerns were addressed. Aklief Pregnancy And Lactation Text: It is unknown if this medication is safe to use during pregnancy.  It is unknown if this medication is excreted in breast milk.  Breastfeeding women should use the topical cream on the smallest area of the skin for the shortest time needed while breastfeeding.  Do not apply to nipple and areola. Simponi Counseling:  I discussed with the patient the risks of golimumab including but not limited to myelosuppression, immunosuppression, autoimmune hepatitis, demyelinating diseases, lymphoma, and serious infections.  The patient understands that monitoring is required including a PPD at baseline and must alert us or the primary physician if symptoms of infection or other concerning signs are noted. Griseofulvin Counseling:  I discussed with the patient the risks of griseofulvin including but not limited to photosensitivity, cytopenia, liver damage, nausea/vomiting and severe allergy.  The patient understands that this medication is best absorbed when taken with a fatty meal (e.g., ice cream or french fries). Odomzo Counseling- I discussed with the patient the risks of Odomzo including but not limited to nausea, vomiting, diarrhea, constipation, weight loss, changes in the sense of taste, decreased appetite, muscle spasms, and hair loss.  The patient verbalized understanding of the proper use and possible adverse effects of Odomzo.  All of the patient's questions and concerns were addressed. Qbrexza Counseling:  I discussed with the patient the risks of Qbrexza including but not limited to headache, mydriasis, blurred vision, dry eyes, nasal dryness, dry mouth, dry throat, dry skin, urinary hesitation, and constipation.  Local skin reactions including erythema, burning, stinging, and itching can also occur. Thalidomide Counseling: I discussed with the patient the risks of thalidomide including but not limited to birth defects, anxiety, weakness, chest pain, dizziness, cough and severe allergy. Acitretin Pregnancy And Lactation Text: This medication is Pregnancy Category X and should not be given to women who are pregnant or may become pregnant in the future. This medication is excreted in breast milk. Zoryve Counseling:  I discussed with the patient that Zoryve is not for use in the eyes, mouth or vagina. The most commonly reported side effects include diarrhea, headache, insomnia, application site pain, upper respiratory tract infections, and urinary tract infections.  All of the patient's questions and concerns were addressed. Clofazimine Counseling:  I discussed with the patient the risks of clofazimine including but not limited to skin and eye pigmentation, liver damage, nausea/vomiting, gastrointestinal bleeding and allergy. Cantharidin Counseling:  I discussed with the patient the risks of Cantharidin including but not limited to pain, redness, burning, itching, and blistering. Tremfya Counseling: I discussed with the patient the risks of guselkumab including but not limited to immunosuppression, serious infections, and drug reactions.  The patient understands that monitoring is required including a PPD at baseline and must alert us or the primary physician if symptoms of infection or other concerning signs are noted. Opzelura Counseling:  I discussed with the patient the risks of Opzelura including but not limited to nasopharngitis, bronchitis, ear infection, eosinophila, hives, diarrhea, folliculitis, tonsillitis, and rhinorrhea.  Taken orally, this medication has been linked to serious infections; higher rate of mortality; malignancy and lymphoproliferative disorders; major adverse cardiovascular events; thrombosis; thrombocytopenia, anemia, and neutropenia; and lipid elevations. Clofazimine Pregnancy And Lactation Text: This medication is Pregnancy Category C and isn't considered safe during pregnancy. It is excreted in breast milk. Olumiant Pregnancy And Lactation Text: Based on animal studies, Olumiant may cause embryo-fetal harm when administered to pregnant women.  The medication should not be used in pregnancy.  Breastfeeding is not recommended during treatment. Rifampin Counseling: I discussed with the patient the risks of rifampin including but not limited to liver damage, kidney damage, red-orange body fluids, nausea/vomiting and severe allergy. Hydroxychloroquine Counseling:  I discussed with the patient that a baseline ophthalmologic exam is needed at the start of therapy and every year thereafter while on therapy. A CBC may also be warranted for monitoring.  The side effects of this medication were discussed with the patient, including but not limited to agranulocytosis, aplastic anemia, seizures, rashes, retinopathy, and liver toxicity. Patient instructed to call the office should any adverse effect occur.  The patient verbalized understanding of the proper use and possible adverse effects of Plaquenil.  All the patient's questions and concerns were addressed. Qbrexza Pregnancy And Lactation Text: There is no available data on Qbrexza use in pregnant women.  There is no available data on Qbrexza use in lactation. Methotrexate Counseling:  Patient counseled regarding adverse effects of methotrexate including but not limited to nausea, vomiting, abnormalities in liver function tests. Patients may develop mouth sores, rash, diarrhea, and abnormalities in blood counts. The patient understands that monitoring is required including LFT's and blood counts.  There is a rare possibility of scarring of the liver and lung problems that can occur when taking methotrexate. Persistent nausea, loss of appetite, pale stools, dark urine, cough, and shortness of breath should be reported immediately. Patient advised to discontinue methotrexate treatment at least three months before attempting to become pregnant.  I discussed the need for folate supplements while taking methotrexate.  These supplements can decrease side effects during methotrexate treatment. The patient verbalized understanding of the proper use and possible adverse effects of methotrexate.  All of the patient's questions and concerns were addressed. Tazorac Counseling:  Patient advised that medication is irritating and drying.  Patient may need to apply sparingly and wash off after an hour before eventually leaving it on overnight.  The patient verbalized understanding of the proper use and possible adverse effects of tazorac.  All of the patient's questions and concerns were addressed. Cantharidin Pregnancy And Lactation Text: This medication has not been proven safe during pregnancy. It is unknown if this medication is excreted in breast milk. Cosentyx Counseling:  I discussed with the patient the risks of Cosentyx including but not limited to worsening of Crohn's disease, immunosuppression, allergic reactions and infections.  The patient understands that monitoring is required including a PPD at baseline and must alert us or the primary physician if symptoms of infection or other concerning signs are noted. Olanzapine Pregnancy And Lactation Text: This medication is pregnancy category C.   There are no adequate and well controlled trials with olanzapine in pregnant females.  Olanzapine should be used during pregnancy only if the potential benefit justifies the potential risk to the fetus.   In a study in lactating healthy women, olanzapine was excreted in breast milk.  It is recommended that women taking olanzapine should not breast feed. Topical Steroids Applications Pregnancy And Lactation Text: Most topical steroids are considered safe to use during pregnancy and lactation.  Any topical steroid applied to the breast or nipple should be washed off before breastfeeding. Cimetidine Pregnancy And Lactation Text: This medication is Pregnancy Category B and is considered safe during pregnancy. It is also excreted in breast milk and breast feeding isn't recommended. Oral Minoxidil Counseling- I discussed with the patient the risks of oral minoxidil including but not limited to shortness of breath, swelling of the feet or ankles, dizziness, lightheadedness, unwanted hair growth and allergic reaction.  The patient verbalized understanding of the proper use and possible adverse effects of oral minoxidil.  All of the patient's questions and concerns were addressed. Topical Sulfur Applications Counseling: Topical Sulfur Counseling: Patient counseled that this medication may cause skin irritation or allergic reactions.  In the event of skin irritation, the patient was advised to reduce the amount of the drug applied or use it less frequently.   The patient verbalized understanding of the proper use and possible adverse effects of topical sulfur application.  All of the patient's questions and concerns were addressed. Methotrexate Pregnancy And Lactation Text: This medication is Pregnancy Category X and is known to cause fetal harm. This medication is excreted in breast milk. Azelaic Acid Counseling: Patient counseled that medicine may cause skin irritation and to avoid applying near the eyes.  In the event of skin irritation, the patient was advised to reduce the amount of the drug applied or use it less frequently.   The patient verbalized understanding of the proper use and possible adverse effects of azelaic acid.  All of the patient's questions and concerns were addressed. Doxepin Counseling:  Patient advised that the medication is sedating and not to drive a car after taking this medication. Patient informed of potential adverse effects including but not limited to dry mouth, urinary retention, and blurry vision.  The patient verbalized understanding of the proper use and possible adverse effects of doxepin.  All of the patient's questions and concerns were addressed. Spironolactone Counseling: Patient advised regarding risks of diarrhea, abdominal pain, hyperkalemia, birth defects (for female patients), liver toxicity and renal toxicity. The patient may need blood work to monitor liver and kidney function and potassium levels while on therapy. The patient verbalized understanding of the proper use and possible adverse effects of spironolactone.  All of the patient's questions and concerns were addressed. Azithromycin Counseling:  I discussed with the patient the risks of azithromycin including but not limited to GI upset, allergic reaction, drug rash, diarrhea, and yeast infections. Bexarotene Counseling:  I discussed with the patient the risks of bexarotene including but not limited to hair loss, dry lips/skin/eyes, liver abnormalities, hyperlipidemia, pancreatitis, depression/suicidal ideation, photosensitivity, drug rash/allergic reactions, hypothyroidism, anemia, leukopenia, infection, cataracts, and teratogenicity.  Patient understands that they will need regular blood tests to check lipid profile, liver function tests, white blood cell count, thyroid function tests and pregnancy test if applicable. Ilumya Counseling: I discussed with the patient the risks of tildrakizumab including but not limited to immunosuppression, malignancy, posterior leukoencephalopathy syndrome, and serious infections.  The patient understands that monitoring is required including a PPD at baseline and must alert us or the primary physician if symptoms of infection or other concerning signs are noted. Griseofulvin Pregnancy And Lactation Text: This medication is Pregnancy Category X and is known to cause serious birth defects. It is unknown if this medication is excreted in breast milk but breast feeding should be avoided. Doxycycline Pregnancy And Lactation Text: This medication is Pregnancy Category D and not consider safe during pregnancy. It is also excreted in breast milk but is considered safe for shorter treatment courses. Ivermectin Counseling:  Patient instructed to take medication on an empty stomach with a full glass of water.  Patient informed of potential adverse effects including but not limited to nausea, diarrhea, dizziness, itching, and swelling of the extremities or lymph nodes.  The patient verbalized understanding of the proper use and possible adverse effects of ivermectin.  All of the patient's questions and concerns were addressed. 5-Fu Counseling: 5-Fluorouracil Counseling:  I discussed with the patient the risks of 5-fluorouracil including but not limited to erythema, scaling, itching, weeping, crusting, and pain. Colchicine Counseling:  Patient counseled regarding adverse effects including but not limited to stomach upset (nausea, vomiting, stomach pain, or diarrhea).  Patient instructed to limit alcohol consumption while taking this medication.  Colchicine may reduce blood counts especially with prolonged use.  The patient understands that monitoring of kidney function and blood counts may be required, especially at baseline. The patient verbalized understanding of the proper use and possible adverse effects of colchicine.  All of the patient's questions and concerns were addressed. Skyrizi Counseling: I discussed with the patient the risks of risankizumab-rzaa including but not limited to immunosuppression, and serious infections.  The patient understands that monitoring is required including a PPD at baseline and must alert us or the primary physician if symptoms of infection or other concerning signs are noted. Imiquimod Counseling:  I discussed with the patient the risks of imiquimod including but not limited to erythema, scaling, itching, weeping, crusting, and pain.  Patient understands that the inflammatory response to imiquimod is variable from person to person and was educated regarded proper titration schedule.  If flu-like symptoms develop, patient knows to discontinue the medication and contact us. Rifampin Pregnancy And Lactation Text: This medication is Pregnancy Category C and it isn't know if it is safe during pregnancy. It is also excreted in breast milk and should not be used if you are breast feeding. Tranexamic Acid Counseling:  Patient advised of the small risk of bleeding problems with tranexamic acid. They were also instructed to call if they developed any nausea, vomiting or diarrhea. All of the patient's questions and concerns were addressed. 5-Fu Pregnancy And Lactation Text: This medication is Pregnancy Category X and contraindicated in pregnancy and in women who may become pregnant. It is unknown if this medication is excreted in breast milk. Bexarotene Pregnancy And Lactation Text: This medication is Pregnancy Category X and should not be given to women who are pregnant or may become pregnant. This medication should not be used if you are breast feeding. Rinvoq Counseling: I discussed with the patient the risks of Rinvoq therapy including but not limited to upper respiratory tract infections, shingles, cold sores, bronchitis, nausea, cough, fever, acne, and headache. Live vaccines should be avoided.  This medication has been linked to serious infections; higher rate of mortality; malignancy and lymphoproliferative disorders; major adverse cardiovascular events; thrombosis; thrombocytopenia, anemia, and neutropenia; lipid elevations; liver enzyme elevations; and gastrointestinal perforations. Rhofade Counseling: Rhofade is a topical medication which can decrease superficial blood flow where applied. Side effects are uncommon and include stinging, redness and allergic reactions. Tazorac Pregnancy And Lactation Text: This medication is not safe during pregnancy. It is unknown if this medication is excreted in breast milk. Opzelura Pregnancy And Lactation Text: There is insufficient data to evaluate drug-associated risk for major birth defects, miscarriage, or other adverse maternal or fetal outcomes.  There is a pregnancy registry that monitors pregnancy outcomes in pregnant persons exposed to the medication during pregnancy.  It is unknown if this medication is excreted in breast milk.  Do not breastfeed during treatment and for about 4 weeks after the last dose. Hydroxychloroquine Pregnancy And Lactation Text: This medication has been shown to cause fetal harm but it isn't assigned a Pregnancy Risk Category. There are small amounts excreted in breast milk. Azithromycin Pregnancy And Lactation Text: This medication is considered safe during pregnancy and is also secreted in breast milk. Dutasteride Male Counseling: Dustasteride Counseling:  I discussed with the patient the risks of use of dutasteride including but not limited to decreased libido, decreased ejaculate volume, and gynecomastia. Women who can become pregnant should not handle medication.  All of the patient's questions and concerns were addressed. Topical Clindamycin Counseling: Patient counseled that this medication may cause skin irritation or allergic reactions.  In the event of skin irritation, the patient was advised to reduce the amount of the drug applied or use it less frequently.   The patient verbalized understanding of the proper use and possible adverse effects of clindamycin.  All of the patient's questions and concerns were addressed. Dupixent Counseling: I discussed with the patient the risks of dupilumab including but not limited to eye infection and irritation, cold sores, injection site reactions, worsening of asthma, allergic reactions and increased risk of parasitic infection.  Live vaccines should be avoided while taking dupilumab. Dupilumab will also interact with certain medications such as warfarin and cyclosporine. The patient understands that monitoring is required and they must alert us or the primary physician if symptoms of infection or other concerning signs are noted. Doxepin Pregnancy And Lactation Text: This medication is Pregnancy Category C and it isn't known if it is safe during pregnancy. It is also excreted in breast milk and breast feeding isn't recommended. Picato Counseling:  I discussed with the patient the risks of Picato including but not limited to erythema, scaling, itching, weeping, crusting, and pain. Rinvoq Pregnancy And Lactation Text: Based on animal studies, Rinvoq may cause embryo-fetal harm when administered to pregnant women.  The medication should not be used in pregnancy.  Breastfeeding is not recommended during treatment and for 6 days after the last dose. Oral Minoxidil Pregnancy And Lactation Text: This medication should only be used when clearly needed if you are pregnant, attempting to become pregnant or breast feeding. Spironolactone Pregnancy And Lactation Text: This medication can cause feminization of the male fetus and should be avoided during pregnancy. The active metabolite is also found in breast milk. Itraconazole Counseling:  I discussed with the patient the risks of itraconazole including but not limited to liver damage, nausea/vomiting, neuropathy, and severe allergy.  The patient understands that this medication is best absorbed when taken with acidic beverages such as non-diet cola or ginger ale.  The patient understands that monitoring is required including baseline LFTs and repeat LFTs at intervals.  The patient understands that they are to contact us or the primary physician if concerning signs are noted. Topical Sulfur Applications Pregnancy And Lactation Text: This medication is Pregnancy Category C and has an unknown safety profile during pregnancy. It is unknown if this topical medication is excreted in breast milk. Prednisone Counseling:  I discussed with the patient the risks of prolonged use of prednisone including but not limited to weight gain, insomnia, osteoporosis, mood changes, diabetes, susceptibility to infection, glaucoma and high blood pressure.  In cases where prednisone use is prolonged, patients should be monitored with blood pressure checks, serum glucose levels and an eye exam.  Additionally, the patient may need to be placed on GI prophylaxis, PCP prophylaxis, and calcium and vitamin D supplementation and/or a bisphosphonate.  The patient verbalized understanding of the proper use and the possible adverse effects of prednisone.  All of the patient's questions and concerns were addressed. Erythromycin Counseling:  I discussed with the patient the risks of erythromycin including but not limited to GI upset, allergic reaction, drug rash, diarrhea, increase in liver enzymes, and yeast infections. Zyclara Counseling:  I discussed with the patient the risks of imiquimod including but not limited to erythema, scaling, itching, weeping, crusting, and pain.  Patient understands that the inflammatory response to imiquimod is variable from person to person and was educated regarded proper titration schedule.  If flu-like symptoms develop, patient knows to discontinue the medication and contact us. Azelaic Acid Pregnancy And Lactation Text: This medication is considered safe during pregnancy and breast feeding. Sarecycline Counseling: Patient advised regarding possible photosensitivity and discoloration of the teeth, skin, lips, tongue and gums.  Patient instructed to avoid sunlight, if possible.  When exposed to sunlight, patients should wear protective clothing, sunglasses, and sunscreen.  The patient was instructed to call the office immediately if the following severe adverse effects occur:  hearing changes, easy bruising/bleeding, severe headache, or vision changes.  The patient verbalized understanding of the proper use and possible adverse effects of sarecycline.  All of the patient's questions and concerns were addressed. Tranexamic Acid Pregnancy And Lactation Text: It is unknown if this medication is safe during pregnancy or breast feeding. Drysol Counseling:  I discussed with the patient the risks of drysol/aluminum chloride including but not limited to skin rash, itching, irritation, burning. Erythromycin Pregnancy And Lactation Text: This medication is Pregnancy Category B and is considered safe during pregnancy. It is also excreted in breast milk. Benzoyl Peroxide Counseling: Patient counseled that medicine may cause skin irritation and bleach clothing.  In the event of skin irritation, the patient was advised to reduce the amount of the drug applied or use it less frequently.   The patient verbalized understanding of the proper use and possible adverse effects of benzoyl peroxide.  All of the patient's questions and concerns were addressed. Isotretinoin Counseling: Patient should get monthly blood tests, not donate blood, not drive at night if vision affected, not share medication, and not undergo elective surgery for 6 months after tx completed. Side effects reviewed, pt to contact office should one occur. Xolair Counseling:  Patient informed of potential adverse effects including but not limited to fever, muscle aches, rash and allergic reactions.  The patient verbalized understanding of the proper use and possible adverse effects of Xolair.  All of the patient's questions and concerns were addressed. Low Dose Naltrexone Counseling- I discussed with the patient the potential risks and side effects of low dose naltrexone including but not limited to: more vivid dreams, headaches, nausea, vomiting, abdominal pain, fatigue, dizziness, and anxiety. Low Dose Naltrexone Pregnancy And Lactation Text: Naltrexone is pregnancy category C.  There have been no adequate and well-controlled studies in pregnant women.  It should be used in pregnancy only if the potential benefit justifies the potential risk to the fetus.   Limited data indicates that naltrexone is minimally excreted into breastmilk. Xolair Pregnancy And Lactation Text: This medication is Pregnancy Category B and is considered safe during pregnancy. This medication is excreted in breast milk. Sotyktu Counseling:  I discussed the most common side effects of Sotyktu including: common cold, sore throat, sinus infections, cold sores, canker sores, folliculitis, and acne.  I also discussed more serious side effects of Sotyktu including but not limited to: serious allergic reactions; increased risk for infections such as TB; cancers such as lymphomas; rhabdomyolysis and elevated CPK; and elevated triglycerides and liver enzymes.  Dupixent Pregnancy And Lactation Text: This medication likely crosses the placenta but the risk for the fetus is uncertain. This medication is excreted in breast milk. Klisyri Counseling:  I discussed with the patient the risks of Klisyri including but not limited to erythema, scaling, itching, weeping, crusting, and pain. Dutasteride Female Counseling: Dutasteride Counseling:  I discussed with the patient the risks of use of dutasteride including but not limited to decreased libido and sexual dysfunction. Explained the teratogenic nature of the medication and stressed the importance of not getting pregnant during treatment. All of the patient's questions and concerns were addressed. Adbry Counseling: I discussed with the patient the risks of tralokinumab including but not limited to eye infection and irritation, cold sores, injection site reactions, worsening of asthma, allergic reactions and increased risk of parasitic infection.  Live vaccines should be avoided while taking tralokinumab. The patient understands that monitoring is required and they must alert us or the primary physician if symptoms of infection or other concerning signs are noted. Topical Clindamycin Pregnancy And Lactation Text: This medication is Pregnancy Category B and is considered safe during pregnancy. It is unknown if it is excreted in breast milk. Wartpeel Counseling:  I discussed with the patient the risks of Wartpeel including but not limited to erythema, scaling, itching, weeping, crusting, and pain. Bactrim Counseling:  I discussed with the patient the risks of sulfa antibiotics including but not limited to GI upset, allergic reaction, drug rash, diarrhea, dizziness, photosensitivity, and yeast infections.  Rarely, more serious reactions can occur including but not limited to aplastic anemia, agranulocytosis, methemoglobinemia, blood dyscrasias, liver or kidney failure, lung infiltrates or desquamative/blistering drug rashes. Hydroxyzine Counseling: Patient advised that the medication is sedating and not to drive a car after taking this medication.  Patient informed of potential adverse effects including but not limited to dry mouth, urinary retention, and blurry vision.  The patient verbalized understanding of the proper use and possible adverse effects of hydroxyzine.  All of the patient's questions and concerns were addressed. Infliximab Counseling:  I discussed with the patient the risks of infliximab including but not limited to myelosuppression, immunosuppression, autoimmune hepatitis, demyelinating diseases, lymphoma, and serious infections.  The patient understands that monitoring is required including a PPD at baseline and must alert us or the primary physician if symptoms of infection or other concerning signs are noted. Otezla Counseling: The side effects of Otezla were discussed with the patient, including but not limited to worsening or new depression, weight loss, diarrhea, nausea, upper respiratory tract infection, and headache. Patient instructed to call the office should any adverse effect occur.  The patient verbalized understanding of the proper use and possible adverse effects of Otezla.  All the patient's questions and concerns were addressed. Metronidazole Counseling:  I discussed with the patient the risks of metronidazole including but not limited to seizures, nausea/vomiting, a metallic taste in the mouth, nausea/vomiting and severe allergy. Benzoyl Peroxide Pregnancy And Lactation Text: This medication is Pregnancy Category C. It is unknown if benzoyl peroxide is excreted in breast milk. Bactrim Pregnancy And Lactation Text: This medication is Pregnancy Category D and is known to cause fetal risk.  It is also excreted in breast milk. Ketoconazole Counseling:   Patient counseled regarding improving absorption with orange juice.  Adverse effects include but are not limited to breast enlargement, headache, diarrhea, nausea, upset stomach, liver function test abnormalities, taste disturbance, and stomach pain.  There is a rare possibility of liver failure that can occur when taking ketoconazole. The patient understands that monitoring of LFTs may be required, especially at baseline. The patient verbalized understanding of the proper use and possible adverse effects of ketoconazole.  All of the patient's questions and concerns were addressed. Valtrex Counseling: I discussed with the patient the risks of valacyclovir including but not limited to kidney damage, nausea, vomiting and severe allergy.  The patient understands that if the infection seems to be worsening or is not improving, they are to call. Isotretinoin Pregnancy And Lactation Text: This medication is Pregnancy Category X and is considered extremely dangerous during pregnancy. It is unknown if it is excreted in breast milk. Azathioprine Counseling:  I discussed with the patient the risks of azathioprine including but not limited to myelosuppression, immunosuppression, hepatotoxicity, lymphoma, and infections.  The patient understands that monitoring is required including baseline LFTs, Creatinine, possible TPMP genotyping and weekly CBCs for the first month and then every 2 weeks thereafter.  The patient verbalized understanding of the proper use and possible adverse effects of azathioprine.  All of the patient's questions and concerns were addressed. Dapsone Counseling: I discussed with the patient the risks of dapsone including but not limited to hemolytic anemia, agranulocytosis, rashes, methemoglobinemia, kidney failure, peripheral neuropathy, headaches, GI upset, and liver toxicity.  Patients who start dapsone require monitoring including baseline LFTs and weekly CBCs for the first month, then every month thereafter.  The patient verbalized understanding of the proper use and possible adverse effects of dapsone.  All of the patient's questions and concerns were addressed. Sarecycline Pregnancy And Lactation Text: This medication is Pregnancy Category D and not consider safe during pregnancy. It is also excreted in breast milk. Solaraze Counseling:  I discussed with the patient the risks of Solaraze including but not limited to erythema, scaling, itching, weeping, crusting, and pain. Spevigo Counseling: I discussed with the patient the risks of Spevigo including but not limited to fatigue, nasuea, vomiting, headache, pruritus, urinary tract infection, an infusion related reactions.  The patient understands that monitoring is required including screening for tuberculosis at baseline and yearly screening thereafter while continuing Spevigo therapy. They should contact us if symptoms of infection or other concerning signs are noted. Solaraze Pregnancy And Lactation Text: This medication is Pregnancy Category B and is considered safe. There is some data to suggest avoiding during the third trimester. It is unknown if this medication is excreted in breast milk. High Dose Vitamin A Counseling: Side effects reviewed, pt to contact office should one occur. Dapsone Pregnancy And Lactation Text: This medication is Pregnancy Category C and is not considered safe during pregnancy or breast feeding. Sotyktu Pregnancy And Lactation Text: There is insufficient data to evaluate whether or not Sotyktu is safe to use during pregnancy.   It is not known if Sotyktu passes into breast milk and whether or not it is safe to use when breastfeeding.   Cibinqo Counseling: I discussed with the patient the risks of Cibinqo therapy including but not limited to common cold, nausea, headache, cold sores, increased blood CPK levels, dizziness, UTIs, fatigue, acne, and vomitting. Live vaccines should be avoided.  This medication has been linked to serious infections; higher rate of mortality; malignancy and lymphoproliferative disorders; major adverse cardiovascular events; thrombosis; thrombocytopenia and lymphopenia; lipid elevations; and retinal detachment. Topical Ketoconazole Counseling: Patient counseled that this medication may cause skin irritation or allergic reactions.  In the event of skin irritation, the patient was advised to reduce the amount of the drug applied or use it less frequently.   The patient verbalized understanding of the proper use and possible adverse effects of ketoconazole.  All of the patient's questions and concerns were addressed. Tetracycline Counseling: Patient counseled regarding possible photosensitivity and increased risk for sunburn.  Patient instructed to avoid sunlight, if possible.  When exposed to sunlight, patients should wear protective clothing, sunglasses, and sunscreen.  The patient was instructed to call the office immediately if the following severe adverse effects occur:  hearing changes, easy bruising/bleeding, severe headache, or vision changes.  The patient verbalized understanding of the proper use and possible adverse effects of tetracycline.  All of the patient's questions and concerns were addressed. Patient understands to avoid pregnancy while on therapy due to potential birth defects. Niacinamide Counseling: I recommended taking niacin or niacinamide, also know as vitamin B3, twice daily. Recent evidence suggests that taking vitamin B3 (500 mg twice daily) can reduce the risk of actinic keratoses and non-melanoma skin cancers. Side effects of vitamin B3 include flushing and headache. Azathioprine Pregnancy And Lactation Text: This medication is Pregnancy Category D and isn't considered safe during pregnancy. It is unknown if this medication is excreted in breast milk. Valtrex Pregnancy And Lactation Text: this medication is Pregnancy Category B and is considered safe during pregnancy. This medication is not directly found in breast milk but it's metabolite acyclovir is present. Opioid Counseling: I discussed with the patient the potential side effects of opioids including but not limited to addiction, altered mental status, and depression. I stressed avoiding alcohol, benzodiazepines, muscle relaxants and sleep aids unless specifically okayed by a physician. The patient verbalized understanding of the proper use and possible adverse effects of opioids. All of the patient's questions and concerns were addressed. They were instructed to flush the remaining pills down the toilet if they did not need them for pain. Adbry Pregnancy And Lactation Text: It is unknown if this medication will adversely affect pregnancy or breast feeding. Otezla Pregnancy And Lactation Text: This medication is Pregnancy Category C and it isn't known if it is safe during pregnancy. It is unknown if it is excreted in breast milk. Hydroxyzine Pregnancy And Lactation Text: This medication is not safe during pregnancy and should not be taken. It is also excreted in breast milk and breast feeding isn't recommended. Enbrel Counseling:  I discussed with the patient the risks of etanercept including but not limited to myelosuppression, immunosuppression, autoimmune hepatitis, demyelinating diseases, lymphoma, and infections.  The patient understands that monitoring is required including a PPD at baseline and must alert us or the primary physician if symptoms of infection or other concerning signs are noted. Protopic Counseling: Patient may experience a mild burning sensation during topical application. Protopic is not approved in children less than 2 years of age. There have been case reports of hematologic and skin malignancies in patients using topical calcineurin inhibitors although causality is questionable. Dutasteride Pregnancy And Lactation Text: This medication is absolutely contraindicated in women, especially during pregnancy and breast feeding. Feminization of male fetuses is possible if taking while pregnant. Finasteride Male Counseling: Finasteride Counseling:  I discussed with the patient the risks of use of finasteride including but not limited to decreased libido, decreased ejaculate volume, gynecomastia, and depression. Women should not handle medication.  All of the patient's questions and concerns were addressed. Oxybutynin Counseling:  I discussed with the patient the risks of oxybutynin including but not limited to skin rash, drowsiness, dry mouth, difficulty urinating, and blurred vision. Carac Counseling:  I discussed with the patient the risks of Carac including but not limited to erythema, scaling, itching, weeping, crusting, and pain. Winlevi Counseling:  I discussed with the patient the risks of topical clascoterone including but not limited to erythema, scaling, itching, and stinging. Patient voiced their understanding. Cephalexin Counseling: I counseled the patient regarding use of cephalexin as an antibiotic for prophylactic and/or therapeutic purposes. Cephalexin (commonly prescribed under brand name Keflex) is a cephalosporin antibiotic which is active against numerous classes of bacteria, including most skin bacteria. Side effects may include nausea, diarrhea, gastrointestinal upset, rash, hives, yeast infections, and in rare cases, hepatitis, kidney disease, seizures, fever, confusion, neurologic symptoms, and others. Patients with severe allergies to penicillin medications are cautioned that there is about a 10% incidence of cross-reactivity with cephalosporins. When possible, patients with penicillin allergies should use alternatives to cephalosporins for antibiotic therapy. Opioid Pregnancy And Lactation Text: These medications can lead to premature delivery and should be avoided during pregnancy. These medications are also present in breast milk in small amounts. Rituxan Counseling:  I discussed with the patient the risks of Rituxan infusions. Side effects can include infusion reactions, severe drug rashes including mucocutaneous reactions, reactivation of latent hepatitis and other infections and rarely progressive multifocal leukoencephalopathy.  All of the patient's questions and concerns were addressed. Erivedge Counseling- I discussed with the patient the risks of Erivedge including but not limited to nausea, vomiting, diarrhea, constipation, weight loss, changes in the sense of taste, decreased appetite, muscle spasms, and hair loss.  The patient verbalized understanding of the proper use and possible adverse effects of Erivedge.  All of the patient's questions and concerns were addressed. Propranolol Counseling:  I discussed with the patient the risks of propranolol including but not limited to low heart rate, low blood pressure, low blood sugar, restlessness and increased cold sensitivity. They should call the office if they experience any of these side effects. Klisyri Pregnancy And Lactation Text: It is unknown if this medication can harm a developing fetus or if it is excreted in breast milk. Ketoconazole Pregnancy And Lactation Text: This medication is Pregnancy Category C and it isn't know if it is safe during pregnancy. It is also excreted in breast milk and breast feeding isn't recommended. Elidel Counseling: Patient may experience a mild burning sensation during topical application. Elidel is not approved in children less than 2 years of age. There have been case reports of hematologic and skin malignancies in patients using topical calcineurin inhibitors although causality is questionable. Metronidazole Pregnancy And Lactation Text: This medication is Pregnancy Category B and considered safe during pregnancy.  It is also excreted in breast milk. Spevigo Pregnancy And Lactation Text: The risk during pregnancy and breastfeeding is uncertain with this medication. This medication does cross the placenta. It is unknown if this medication is found in breast milk. Stelara Counseling:  I discussed with the patient the risks of ustekinumab including but not limited to immunosuppression, malignancy, posterior leukoencephalopathy syndrome, and serious infections.  The patient understands that monitoring is required including a PPD at baseline and must alert us or the primary physician if symptoms of infection or other concerning signs are noted. Minoxidil Counseling: Minoxidil is a topical medication which can increase blood flow where it is applied. It is uncertain how this medication increases hair growth. Side effects are uncommon and include stinging and allergic reactions. Cibinqo Pregnancy And Lactation Text: It is unknown if this medication will adversely affect pregnancy or breast feeding.  You should not take this medication if you are currently pregnant or planning a pregnancy or while breastfeeding. Minocycline Counseling: Patient advised regarding possible photosensitivity and discoloration of the teeth, skin, lips, tongue and gums.  Patient instructed to avoid sunlight, if possible.  When exposed to sunlight, patients should wear protective clothing, sunglasses, and sunscreen.  The patient was instructed to call the office immediately if the following severe adverse effects occur:  hearing changes, easy bruising/bleeding, severe headache, or vision changes.  The patient verbalized understanding of the proper use and possible adverse effects of minocycline.  All of the patient's questions and concerns were addressed. Albendazole Counseling:  I discussed with the patient the risks of albendazole including but not limited to cytopenia, kidney damage, nausea/vomiting and severe allergy.  The patient understands that this medication is being used in an off-label manner. Propranolol Pregnancy And Lactation Text: This medication is Pregnancy Category C and it isn't known if it is safe during pregnancy. It is excreted in breast milk. High Dose Vitamin A Pregnancy And Lactation Text: High dose vitamin A therapy is contraindicated during pregnancy and breast feeding. Cellcept Counseling:  I discussed with the patient the risks of mycophenolate mofetil including but not limited to infection/immunosuppression, GI upset, hypokalemia, hypercholesterolemia, bone marrow suppression, lymphoproliferative disorders, malignancy, GI ulceration/bleed/perforation, colitis, interstitial lung disease, kidney failure, progressive multifocal leukoencephalopathy, and birth defects.  The patient understands that monitoring is required including a baseline creatinine and regular CBC testing. In addition, patient must alert us immediately if symptoms of infection or other concerning signs are noted. Gabapentin Counseling: I discussed with the patient the risks of gabapentin including but not limited to dizziness, somnolence, fatigue and ataxia. Soolantra Counseling: I discussed with the patients the risks of topial Soolantra. This is a medicine which decreases the number of mites and inflammation in the skin. You experience burning, stinging, eye irritation or allergic reactions.  Please call our office if you develop any problems from using this medication. Bimzelx Counseling:  I discussed with the patient the risks of Bimzelx including but not limited to depression, immunosuppression, allergic reactions and infections.  The patient understands that monitoring is required including a PPD at baseline and must alert us or the primary physician if symptoms of infection or other concerning signs are noted. Xeljanz Counseling: I discussed with the patient the risks of Xeljanz therapy including increased risk of infection, liver issues, headache, diarrhea, or cold symptoms. Live vaccines should be avoided. They were instructed to call if they have any problems. Niacinamide Pregnancy And Lactation Text: These medications are considered safe during pregnancy. Nsaids Counseling: NSAID Counseling: I discussed with the patient that NSAIDs should be taken with food. Prolonged use of NSAIDs can result in the development of stomach ulcers.  Patient advised to stop taking NSAIDs if abdominal pain occurs.  The patient verbalized understanding of the proper use and possible adverse effects of NSAIDs.  All of the patient's questions and concerns were addressed. Cephalexin Pregnancy And Lactation Text: This medication is Pregnancy Category B and considered safe during pregnancy.  It is also excreted in breast milk but can be used safely for shorter doses. Cyclophosphamide Pregnancy And Lactation Text: This medication is Pregnancy Category D and it isn't considered safe during pregnancy. This medication is excreted in breast milk. Topical Metronidazole Counseling: Metronidazole is a topical antibiotic medication. You may experience burning, stinging, redness, or allergic reactions.  Please call our office if you develop any problems from using this medication. Finasteride Female Counseling: Finasteride Counseling:  I discussed with the patient the risks of use of finasteride including but not limited to decreased libido and sexual dysfunction. Explained the teratogenic nature of the medication and stressed the importance of not getting pregnant during treatment. All of the patient's questions and concerns were addressed. Detail Level: Simple Xelconcepcionz Pregnancy And Lactation Text: This medication is Pregnancy Category D and is not considered safe during pregnancy.  The risk during breast feeding is also uncertain. Winlevi Pregnancy And Lactation Text: This medication is considered safe during pregnancy and breastfeeding. Terbinafine Counseling: Patient counseling regarding adverse effects of terbinafine including but not limited to headache, diarrhea, rash, upset stomach, liver function test abnormalities, itching, taste/smell disturbance, nausea, abdominal pain, and flatulence.  There is a rare possibility of liver failure that can occur when taking terbinafine.  The patient understands that a baseline LFT and kidney function test may be required. The patient verbalized understanding of the proper use and possible adverse effects of terbinafine.  All of the patient's questions and concerns were addressed. Humira Counseling:  I discussed with the patient the risks of adalimumab including but not limited to myelosuppression, immunosuppression, autoimmune hepatitis, demyelinating diseases, lymphoma, and serious infections.  The patient understands that monitoring is required including a PPD at baseline and must alert us or the primary physician if symptoms of infection or other concerning signs are noted. Rituxan Pregnancy And Lactation Text: This medication is Pregnancy Category C and it isn't know if it is safe during pregnancy. It is unknown if this medication is excreted in breast milk but similar antibodies are known to be excreted. Siliq Counseling:  I discussed with the patient the risks of Siliq including but not limited to new or worsening depression, suicidal thoughts and behavior, immunosuppression, malignancy, posterior leukoencephalopathy syndrome, and serious infections.  The patient understands that monitoring is required including a PPD at baseline and must alert us or the primary physician if symptoms of infection or other concerning signs are noted. There is also a special program designed to monitor depression which is required with Siliq. Eucrisa Counseling: Patient may experience a mild burning sensation during topical application. Eucrisa is not approved in children less than 2 years of age. Arava Counseling:  Patient counseled regarding adverse effects of Arava including but not limited to nausea, vomiting, abnormalities in liver function tests. Patients may develop mouth sores, rash, diarrhea, and abnormalities in blood counts. The patient understands that monitoring is required including LFTs and blood counts.  There is a rare possibility of scarring of the liver and lung problems that can occur when taking methotrexate. Persistent nausea, loss of appetite, pale stools, dark urine, cough, and shortness of breath should be reported immediately. Patient advised to discontinue Arava treatment and consult with a physician prior to attempting conception. The patient will have to undergo a treatment to eliminate Arava from the body prior to conception. Calcipotriene Counseling:  I discussed with the patient the risks of calcipotriene including but not limited to erythema, scaling, itching, and irritation. SSKI Counseling:  I discussed with the patient the risks of SSKI including but not limited to thyroid abnormalities, metallic taste, GI upset, fever, headache, acne, arthralgias, paraesthesias, lymphadenopathy, easy bleeding, arrhythmias, and allergic reaction. Litfulo Counseling: I discussed with the patient the risks of Litfulo therapy including but not limited to upper respiratory tract infections, shingles, cold sores, and nausea. Live vaccines should be avoided.  This medication has been linked to serious infections; higher rate of mortality; malignancy and lymphoproliferative disorders; major adverse cardiovascular events; thrombosis; gastrointestinal perforations; neutropenia; lymphopenia; anemia; liver enzyme elevations; and lipid elevations. Soolantra Pregnancy And Lactation Text: This medication is Pregnancy Category C. This medication is considered safe during breast feeding. Bimzelx Pregnancy And Lactation Text: This medication crosses the placenta and the safety is uncertain during pregnancy. It is unknown if this medication is present in breast milk.

## 2024-12-01 ENCOUNTER — HEALTH MAINTENANCE LETTER (OUTPATIENT)
Age: 39
End: 2024-12-01

## (undated) DEVICE — STPL SKIN 35W 059037

## (undated) DEVICE — SU PLAIN 2-0 CT 27" 853H

## (undated) DEVICE — BARRIER SEPRAFILM 5X6" SINGLE SHEET 4301-02

## (undated) DEVICE — PACK C-SECTION LF PL15OTA83B

## (undated) DEVICE — SU MONOCRYL 0 CT-1 36" Y346H

## (undated) DEVICE — CATH TRAY FOLEY 16FR BARDEX W/DRAIN BAG STATLOCK 300316A

## (undated) DEVICE — BNDG ABDOMINAL BINDER 10X26-50" 08140145

## (undated) DEVICE — GLOVE ESTEEM POWDER FREE SMT 7.0  2D72PT70

## (undated) DEVICE — BARRIER INTERCEED 5X6" 4350XL

## (undated) DEVICE — STOCKING SLEEVE COMPRESSION CALF LG

## (undated) DEVICE — PREP CHLORAPREP 26ML TINTED ORANGE  260815

## (undated) DEVICE — SUCTION CANISTER MEDIVAC LINER 1500ML W/LID 65651-515

## (undated) DEVICE — GLOVE SENSICARE PI POWDER FREE 7.5 LATEX FREE MSG9075

## (undated) DEVICE — SOL WATER IRRIG 1000ML BOTTLE 07139-09

## (undated) DEVICE — BASIN SET MAJOR

## (undated) DEVICE — STRAP KNEE/BODY 31143004

## (undated) DEVICE — ESU GROUND PAD UNIVERSAL W/O CORD

## (undated) DEVICE — SU VICRYL 0 CT-1 36" J346H

## (undated) DEVICE — SOL NACL 0.9% IRRIG 1000ML BOTTLE 07138-09

## (undated) DEVICE — DRSG ADAPTIC 3X8" 6113

## (undated) DEVICE — SU MONOCRYL 4-0 PS-2 18" UND Y496G

## (undated) DEVICE — SU VICRYL 3-0 SH 27" J316H

## (undated) RX ORDER — FENTANYL CITRATE 50 UG/ML
INJECTION, SOLUTION INTRAMUSCULAR; INTRAVENOUS
Status: DISPENSED
Start: 2021-12-02

## (undated) RX ORDER — OXYTOCIN/0.9 % SODIUM CHLORIDE 30/500 ML
PLASTIC BAG, INJECTION (ML) INTRAVENOUS
Status: DISPENSED
Start: 2020-02-12

## (undated) RX ORDER — ONDANSETRON 2 MG/ML
INJECTION INTRAMUSCULAR; INTRAVENOUS
Status: DISPENSED
Start: 2020-02-12

## (undated) RX ORDER — OXYTOCIN/0.9 % SODIUM CHLORIDE 30/500 ML
PLASTIC BAG, INJECTION (ML) INTRAVENOUS
Status: DISPENSED
Start: 2021-12-02

## (undated) RX ORDER — KETOROLAC TROMETHAMINE 30 MG/ML
INJECTION, SOLUTION INTRAMUSCULAR; INTRAVENOUS
Status: DISPENSED
Start: 2020-02-12

## (undated) RX ORDER — EPINEPHRINE 1 MG/ML
INJECTION, SOLUTION, CONCENTRATE INTRAVENOUS
Status: DISPENSED
Start: 2021-12-02

## (undated) RX ORDER — NALBUPHINE HYDROCHLORIDE 10 MG/ML
INJECTION, SOLUTION INTRAMUSCULAR; INTRAVENOUS; SUBCUTANEOUS
Status: DISPENSED
Start: 2021-12-02

## (undated) RX ORDER — FENTANYL CITRATE 50 UG/ML
INJECTION, SOLUTION INTRAMUSCULAR; INTRAVENOUS
Status: DISPENSED
Start: 2020-02-12

## (undated) RX ORDER — MORPHINE SULFATE 1 MG/ML
INJECTION, SOLUTION EPIDURAL; INTRATHECAL; INTRAVENOUS
Status: DISPENSED
Start: 2021-12-02

## (undated) RX ORDER — ONDANSETRON 2 MG/ML
INJECTION INTRAMUSCULAR; INTRAVENOUS
Status: DISPENSED
Start: 2021-12-02

## (undated) RX ORDER — MORPHINE SULFATE 1 MG/ML
INJECTION, SOLUTION EPIDURAL; INTRATHECAL; INTRAVENOUS
Status: DISPENSED
Start: 2020-02-12